# Patient Record
Sex: FEMALE | Race: WHITE | NOT HISPANIC OR LATINO | Employment: UNEMPLOYED | URBAN - METROPOLITAN AREA
[De-identification: names, ages, dates, MRNs, and addresses within clinical notes are randomized per-mention and may not be internally consistent; named-entity substitution may affect disease eponyms.]

---

## 2017-04-15 ENCOUNTER — HOSPITAL ENCOUNTER (OUTPATIENT)
Facility: MEDICAL CENTER | Age: 32
End: 2017-04-15
Attending: PHYSICIAN ASSISTANT
Payer: COMMERCIAL

## 2017-04-15 ENCOUNTER — OFFICE VISIT (OUTPATIENT)
Dept: URGENT CARE | Facility: PHYSICIAN GROUP | Age: 32
End: 2017-04-15
Payer: COMMERCIAL

## 2017-04-15 VITALS
OXYGEN SATURATION: 99 % | BODY MASS INDEX: 23.32 KG/M2 | HEART RATE: 106 BPM | DIASTOLIC BLOOD PRESSURE: 66 MMHG | HEIGHT: 65 IN | RESPIRATION RATE: 14 BRPM | SYSTOLIC BLOOD PRESSURE: 100 MMHG | TEMPERATURE: 101.3 F | WEIGHT: 140 LBS

## 2017-04-15 DIAGNOSIS — N10 PYELONEPHRITIS, ACUTE: ICD-10-CM

## 2017-04-15 DIAGNOSIS — N30.01 ACUTE CYSTITIS WITH HEMATURIA: ICD-10-CM

## 2017-04-15 DIAGNOSIS — J06.9 VIRAL URI: ICD-10-CM

## 2017-04-15 LAB
APPEARANCE UR: NORMAL
BILIRUB UR STRIP-MCNC: NORMAL MG/DL
COLOR UR AUTO: NORMAL
GLUCOSE UR STRIP.AUTO-MCNC: NEGATIVE MG/DL
INT CON NEG: NEGATIVE
INT CON NEG: NEGATIVE
INT CON POS: POSITIVE
INT CON POS: POSITIVE
KETONES UR STRIP.AUTO-MCNC: NEGATIVE MG/DL
LEUKOCYTE ESTERASE UR QL STRIP.AUTO: NORMAL
NITRITE UR QL STRIP.AUTO: POSITIVE
PH UR STRIP.AUTO: 6.5 [PH] (ref 5–8)
POC URINE PREGNANCY TEST: NEGATIVE
PROT UR QL STRIP: 30 MG/DL
RBC UR QL AUTO: NORMAL
S PYO AG THROAT QL: NEGATIVE
SP GR UR STRIP.AUTO: 1.01
UROBILINOGEN UR STRIP-MCNC: 8 MG/DL

## 2017-04-15 PROCEDURE — 99000 SPECIMEN HANDLING OFFICE-LAB: CPT | Performed by: PHYSICIAN ASSISTANT

## 2017-04-15 PROCEDURE — 99204 OFFICE O/P NEW MOD 45 MIN: CPT | Performed by: PHYSICIAN ASSISTANT

## 2017-04-15 PROCEDURE — 87880 STREP A ASSAY W/OPTIC: CPT | Performed by: PHYSICIAN ASSISTANT

## 2017-04-15 PROCEDURE — 81025 URINE PREGNANCY TEST: CPT | Performed by: PHYSICIAN ASSISTANT

## 2017-04-15 PROCEDURE — 87086 URINE CULTURE/COLONY COUNT: CPT

## 2017-04-15 PROCEDURE — 81002 URINALYSIS NONAUTO W/O SCOPE: CPT | Performed by: PHYSICIAN ASSISTANT

## 2017-04-15 PROCEDURE — 87077 CULTURE AEROBIC IDENTIFY: CPT

## 2017-04-15 PROCEDURE — 87186 SC STD MICRODIL/AGAR DIL: CPT

## 2017-04-15 RX ORDER — CEFTRIAXONE 1 G/1
1 INJECTION, POWDER, FOR SOLUTION INTRAMUSCULAR; INTRAVENOUS ONCE
Status: COMPLETED | OUTPATIENT
Start: 2017-04-15 | End: 2017-04-15

## 2017-04-15 RX ORDER — CIPROFLOXACIN 500 MG/1
500 TABLET, FILM COATED ORAL 2 TIMES DAILY
Qty: 20 TAB | Refills: 0 | Status: SHIPPED | OUTPATIENT
Start: 2017-04-15 | End: 2022-10-11

## 2017-04-15 RX ADMIN — CEFTRIAXONE 1 G: 1 INJECTION, POWDER, FOR SOLUTION INTRAMUSCULAR; INTRAVENOUS at 16:59

## 2017-04-15 NOTE — MR AVS SNAPSHOT
"        Lissette Lee   4/15/2017 4:15 PM   Office Visit   MRN: [id number]    Department:  [facility]   Dept Phone:  [phone]    Description:  Female : 1985   Provider:  Katya Perez PA-C           Reason for Visit     Urinary Frequency painful urination, Low back pain X 3 weeks     Pharyngitis ear fullness, nasal congestion, sinus pain and pressure, headache X 3 days       Allergies as of 4/15/2017     Allergen Noted Reactions    No Known Drug Allergy 10/14/2009         You were diagnosed with     Fever, unspecified fever cause   [8169281]       Acute cystitis with hematuria   [[id number]]       Viral URI   [[id number]]         Vital Signs     Blood Pressure Pulse Temperature Respirations Height Weight    100/66 mmHg 106 38.5 °C (101.3 °F) 14 1.651 m (5' 5\") 63.504 kg (140 lb)    Body Mass Index Oxygen Saturation                23.30 kg/m2 99%          Basic Information     Date Of Birth Sex Race Ethnicity Preferred Language    1985 Female White Non- English      Problem List              ICD-10-CM Priority Class Noted - Resolved    Supervision of normal pregnancy Z34.90   2011 - Present    Fetal pyelectasis- f/u with PANN O35.8XX0   2011 - Present    GBS (group B Streptococcus carrier), +RV culture, currently pregnant O99.820   2011 - Present      Health Maintenance        Date Due Completion Dates    PAP SMEAR 3/27/2006 ---    IMM DTaP/Tdap/Td Vaccine (2 - Td) 2021            Results     POCT Urinalysis      Component Value Standard Range & Units    POC Color Digna Negative    POC Appearance Cloudy Negative    POC Leukocyte Esterase Moderate Negative    POC Nitrites Positive Negative    POC Urobiligen 8 Negative (0.2) mg/dL    POC Protein 30 Negative mg/dL    POC Urine PH 6.5 5.0 - 8.0    POC Blood Moderate Negative    POC Specific Gravity 1.010 <1.005 - >1.030    POC Ketones Negative Negative mg/dL    POC Biliruben Small Negative mg/dL    POC Glucose " Negative Negative mg/dL                POCT Rapid Strep A      Component    Rapid Strep Screen    Negative    Internal Control Positive    Positive    Internal Control Negative    Negative                POCT Pregnancy      Component Value Standard Range & Units    POC Urine Pregnancy Test Negative Negative    Internal Control Positive Positive     Internal Control Negative Negative                         Current Immunizations     Influenza TIV (IM) 2/24/2011 10:49 AM    Tdap Vaccine 2/24/2011 10:50 AM      Below and/or attached are the medications your provider expects you to take. Review all of your home medications and newly ordered medications with your provider and/or pharmacist. Follow medication instructions as directed by your provider and/or pharmacist. Please keep your medication list with you and share with your provider. Update the information when medications are discontinued, doses are changed, or new medications (including over-the-counter products) are added; and carry medication information at all times in the event of emergency situations     Allergies:  NO KNOWN DRUG ALLERGY - (reactions not documented)               Medications  Valid as of: April 15, 2017 -  5:05 PM    Generic Name Brand Name Tablet Size Instructions for use    .                 Medicines prescribed today were sent to:     [facility][id number] - [place], [place] - [address] [facility]    [address] [place] [address]    Phone: [phone] Fax: [phone]    Open 24 Hours?: No      Medication refill instructions:       If your prescription bottle indicates you have medication refills left, it is not necessary to call your provider’s office. Please contact your pharmacy and they will refill your medication.    If your prescription bottle indicates you do not have any refills left, you may request refills at any time through one of the following ways: The online [employer] system (except Urgent Care), by calling your provider’s office, or by  asking your pharmacy to contact your provider’s office with a refill request. Medication refills are processed only during regular business hours and may not be available until the next business day. Your provider may request additional information or to have a follow-up visit with you prior to refilling your medication.   *Please Note: Medication refills are assigned a new Rx number when refilled electronically. Your pharmacy may indicate that no refills were authorized even though a new prescription for the same medication is available at the pharmacy. Please request the medicine by name with the pharmacy before contacting your provider for a refill.        Your To Do List     Future Labs/Procedures Complete By Expires    URINE CULTURE(NEW)  As directed 4/15/2018      Other Notes About Your Plan     IOL scheduled for 3/2 @ 10 am PG gel and 3/3 @ 10 am for AROM/Pit no instructions given.  Transfer of care from Dr. Valdes - all records scanned           H[employer] Access Code: [email or web address]  Expires: 5/15/2017  5:05 PM    Your email address is not on file at [facility].  Email Addresses are required for you to sign up for [employer], please contact [phone] to verify your personal information and to provide your email address prior to attempting to register for H[employer].    Lissette Lee  [address] [place] [address], [place] [address]    H[employer] Space  A secure, online tool to manage your health information     [facility]’s H[employer] Space® is a secure, online tool that connects you to your personalized health information from the privacy of your home -- day or night - making it very easy for you to manage your healthcare. Once the activation process is completed, you can even access your medical information using the H[employer] Space karthikeyan, which is available for free in the Apple Karthikeyan store or Google Play store.     To learn more about Hire Space, visit [email or web address]    There are two levels of access available (as shown below):   My Chart  Features  [facility]  Specialists [facility]  Urgent  Care Non-[facility] Primary [facility] Doctor   Email your healthcare team securely and privately 24/7 X X X    Manage appointments: schedule your next appointment; view details of past/upcoming appointments X      Request prescription refills. X      View recent personal medical records, including lab and immunizations X X X X   View health record, including health history, allergies, medications X X X X   Read reports about your outpatient visits, procedures, consult and ER notes X X X X   See your discharge summary, which is a recap of your hospital and/or ER visit that includes your diagnosis, lab results, and care plan X X  X     How to register for Vendor Registry:  Once your e-mail address has been verified, follow the following steps to sign up for Vendor Registry.     1. Go to  [email or web address]  2. Click on the Sign Up Now box, which takes you to the New Member Sign Up page. You will need to provide the following information:  a. Enter your Vendor Registry Access Code exactly as it appears at the top of this page. (You will not need to use this code after you’ve completed the sign-up process. If you do not sign up before the expiration date, you must request a new code.)   b. Enter your date of birth.   c. Enter your home email address.   d. Click Submit, and follow the next screen’s instructions.  3. Create a Vendor Registry ID. This will be your Vendor Registry login ID and cannot be changed, so think of one that is secure and easy to remember.  4. Create a Vendor Registry password. You can change your password at any time.  5. Enter your Password Reset Question and Answer. This can be used at a later time if you forget your password.   6. Enter your e-mail address. This allows you to receive e-mail notifications when new information is available in Vendor Registry.  7. Click Sign Up. You can now view your health information.    For assistance activating your Vendor Registry account, call [phone]

## 2017-04-15 NOTE — PROGRESS NOTES
Chief Complaint   Patient presents with   • Urinary Frequency     painful urination, Low back pain X 3 weeks    • Pharyngitis     ear fullness, nasal congestion, sinus pain and pressure, headache X 3 days        HISTORY OF PRESENT ILLNESS: Patient is a 32 y.o. female who presents today for    1. 3 weeks of UTI symptoms, seemingly worsening in last 3-4 days.   Patient states her symptoms overall have included painful and frequent urination however they have waxed and waned rather than progressively worsened throughout the course of illness.  She started getting right flank pain in the last few days and has felt feverish.   No nausea or vomiting.  No attempted interventions.   Denies vaginal discharge or concern for STD.  LMP last month.     2. In addition patient has also had nasal congestion, sore throat and headache in last 3 days.  She felt that the fevers were explained by this.  Not worsening but not getting better.  Not really coughing.   No attempted interventions.     Patient Active Problem List    Diagnosis Date Noted   • GBS (group B Streptococcus carrier), +RV culture, currently pregnant 01/31/2011   • Supervision of normal pregnancy 01/12/2011   • Fetal pyelectasis- f/u with PANN 01/12/2011       Allergies:No known drug allergy    No current [facility]-ordered outpatient prescriptions on file.     No current [facility]-ordered facility-administered medications on file.       Past Medical History   Diagnosis Date   • ASTHMA        Social History   Substance Use Topics   • Smoking status: Never Smoker    • Smokeless tobacco: None   • Alcohol Use: No       Family Status   Relation Status Death Age   • Mother Alive    • Father Alive    • Maternal Grandmother Alive    • Maternal Grandfather Alive    • Paternal Grandmother Alive    • Paternal Grandfather Alive      Family History   Problem Relation Age of Onset   • Cancer Maternal Grandmother      breast cancer        ROS:  Review of Systems   Constitutional: SEE  "HPI  HENT: SEE HPI  Eyes: Negative for blurred vision.   Respiratory: Negative for cough, sputum production, shortness of breath and wheezing.    Cardiovascular: Negative for chest pain, palpitations, orthopnea and leg swelling.   Gastrointestinal: SEE HPI  Genitourinary: SEE HPI  All other systems reviewed and are negative.       Exam:  Blood pressure 100/66, pulse 106, temperature 38.5 °C (101.3 °F), resp. rate 14, height 1.651 m (5' 5\"), weight 63.504 kg (140 lb), SpO2 99 %, unknown if currently breastfeeding.  General:  Well nourished, well developed female in NAD   Eyes: PERRLA, EOM within normal limits, no conjunctival injection, no scleral icterus, visual fields and acuity grossly intact.  Ears: Normal shape and symmetry, no tenderness, no discharge. External canals are without any significant edema or erythema. Tympanic membranes are without any inflammation, no effusion. Gross auditory acuity is intact  Nose: Symmetrical, sinuses without tenderness, clear rhinorrhea.   Mouth: reasonable hygiene, moderate diffuse erythema exudates or tonsillar enlargement.  Neck: no masses, range of motion within normal limits, no tracheal deviation. No lymphadenopathy  Pulmonary: Normal respiratory effort, no wheezes, crackles, or rhonchi.  Cardiovascular: regular rate and rhythm without murmurs, rubs, or gallops.  Abdomen: Soft, moderate suprapubic TTP, nondistended. Normal bowel sounds. No hepatosplenomegaly or masses, or hernias. No rebound or guarding.  + right CVA tenderness.   Skin: No visible rashes or lesion. Warm, pink, dry.   Extremities: no clubbing, cyanosis, or edema.  Neuro: A&O x 3. Speech normal/clear.  Normal gait.       Component Results      Component Value Ref Range & Units Status     POC Color Digna Negative Final     POC Appearance Cloudy Negative Final     POC Leukocyte Esterase Moderate Negative Final     POC Nitrites Positive Negative Final     POC Urobiligen 8 Negative (0.2) mg/dL Final     POC " Protein 30 Negative mg/dL Final     POC Urine PH 6.5 5.0 - 8.0 Final     POC Blood Moderate Negative Final     POC Specific Gravity 1.010 <1.005 - >1.030 Final     POC Ketones Negative Negative mg/dL Final     POC Biliruben Small Negative mg/dL Final     POC Glucose Negative Negative mg/dL Final       Assessment/Plan:  1. Pyelonephritis, acute  cefTRIAXone (ROCEPHIN) injection 1 g    POCT Rapid Strep A    ciprofloxacin (CIPRO) 500 MG Tab   2. Acute cystitis with hematuria  cefTRIAXone (ROCEPHIN) injection 1 g    POCT Urinalysis    POCT Pregnancy    URINE CULTURE(NEW)    ciprofloxacin (CIPRO) 500 MG Tab   3. Viral URI         -fevers in setting of right CVA tenderness and 3 weeks of UTI symptoms will cover presumptively for acute pyelonephritis.   -1 gram Rocephin given in clinic and patient monitored for 15 mins.   -Cipro to follow.   -culture sent  -strep was ran, URI with benign viral appearing process.  Supportive care discussed  -void before/after intercourse.  Recommend abstain until treatment complete/symptoms resolved.   -signs and symptoms of worsening  infection discussed and to seek prompt medical care should they arise, recommend ER at that point.       Supportive care, differential diagnoses, and indications for immediate follow-up discussed with patient.   Pathogenesis of diagnosis discussed including typical length and natural progression.   Instructed to return to clinic or nearest emergency department for any change in condition, further concerns, or worsening of symptoms.  Patient states understanding of the plan of care and discharge instructions.      Katya Perez PA-C

## 2017-04-16 DIAGNOSIS — N30.01 ACUTE CYSTITIS WITH HEMATURIA: ICD-10-CM

## 2017-04-18 LAB
BACTERIA UR CULT: ABNORMAL
SIGNIFICANT IND 70042: ABNORMAL
SOURCE SOURCE: ABNORMAL

## 2021-02-09 ENCOUNTER — HOSPITAL ENCOUNTER (EMERGENCY)
Facility: MEDICAL CENTER | Age: 36
End: 2021-02-09
Payer: COMMERCIAL

## 2021-02-09 VITALS
RESPIRATION RATE: 18 BRPM | DIASTOLIC BLOOD PRESSURE: 157 MMHG | SYSTOLIC BLOOD PRESSURE: 227 MMHG | HEART RATE: 106 BPM | HEIGHT: 65 IN | BODY MASS INDEX: 23.3 KG/M2 | TEMPERATURE: 97.5 F | OXYGEN SATURATION: 100 %

## 2021-02-09 PROCEDURE — 302449 STATCHG TRIAGE ONLY (STATISTIC)

## 2021-02-09 NOTE — ED NOTES
Called pt to be triaged with no response. Pt not in the lobby, restroom, or outside. Pt will be dismissed.

## 2021-03-19 ENCOUNTER — HOSPITAL ENCOUNTER (EMERGENCY)
Facility: MEDICAL CENTER | Age: 36
End: 2021-03-19
Attending: EMERGENCY MEDICINE

## 2021-03-19 VITALS
SYSTOLIC BLOOD PRESSURE: 181 MMHG | HEART RATE: 76 BPM | DIASTOLIC BLOOD PRESSURE: 123 MMHG | WEIGHT: 130 LBS | HEIGHT: 67 IN | BODY MASS INDEX: 20.4 KG/M2 | RESPIRATION RATE: 25 BRPM | OXYGEN SATURATION: 100 % | TEMPERATURE: 98 F

## 2021-03-19 DIAGNOSIS — R41.82 ALTERED MENTAL STATUS, UNSPECIFIED ALTERED MENTAL STATUS TYPE: ICD-10-CM

## 2021-03-19 DIAGNOSIS — T50.901A ACCIDENTAL DRUG OVERDOSE, INITIAL ENCOUNTER: ICD-10-CM

## 2021-03-19 LAB
ANION GAP SERPL CALC-SCNC: 11 MMOL/L (ref 7–16)
BASOPHILS # BLD AUTO: 5.2 % (ref 0–1.8)
BUN SERPL-MCNC: 20 MG/DL (ref 8–22)
CALCIUM SERPL-MCNC: 9.1 MG/DL (ref 8.5–10.5)
CHLORIDE SERPL-SCNC: 102 MMOL/L (ref 96–112)
CO2 SERPL-SCNC: 23 MMOL/L (ref 20–33)
CREAT SERPL-MCNC: 1.38 MG/DL (ref 0.5–1.4)
EOSINOPHIL NFR BLD: 7 % (ref 0–6.9)
ERYTHROCYTE [DISTWIDTH] IN BLOOD BY AUTOMATED COUNT: 42.9 FL (ref 35.9–50)
GLUCOSE SERPL-MCNC: 208 MG/DL (ref 65–99)
HCT VFR BLD AUTO: 42.7 % (ref 37–47)
HGB BLD-MCNC: 13.4 G/DL (ref 12–16)
LG PLATELETS BLD QL SMEAR: NORMAL
LYMPHOCYTES NFR BLD: 41.7 % (ref 22–41)
MANUAL DIFF BLD: NORMAL
MCH RBC QN AUTO: 26 PG (ref 27–33)
MCHC RBC AUTO-ENTMCNC: 31.4 G/DL (ref 33.6–35)
MCV RBC AUTO: 82.9 FL (ref 81.4–97.8)
MONOCYTES NFR BLD AUTO: 4.4 % (ref 0–13.4)
MORPHOLOGY BLD-IMP: NORMAL
NEUTROPHILS NFR BLD: 41.7 % (ref 44–72)
NRBC # BLD AUTO: 0 K/UL
NRBC BLD-RTO: 0 /100 WBC
PLATELET # BLD AUTO: 275 K/UL (ref 164–446)
PLATELET BLD QL SMEAR: NORMAL
PMV BLD AUTO: 11.6 FL (ref 9–12.9)
POTASSIUM SERPL-SCNC: 3.9 MMOL/L (ref 3.6–5.5)
RBC # BLD AUTO: 5.15 M/UL (ref 4.2–5.4)
RBC BLD AUTO: PRESENT
SODIUM SERPL-SCNC: 136 MMOL/L (ref 135–145)
WBC # BLD AUTO: 12.1 K/UL (ref 4.8–10.8)

## 2021-03-19 PROCEDURE — 80048 BASIC METABOLIC PNL TOTAL CA: CPT

## 2021-03-19 PROCEDURE — 85007 BL SMEAR W/DIFF WBC COUNT: CPT

## 2021-03-19 PROCEDURE — 99284 EMERGENCY DEPT VISIT MOD MDM: CPT

## 2021-03-19 PROCEDURE — 85027 COMPLETE CBC AUTOMATED: CPT

## 2021-03-19 PROCEDURE — 700111 HCHG RX REV CODE 636 W/ 250 OVERRIDE (IP): Performed by: EMERGENCY MEDICINE

## 2021-03-19 PROCEDURE — 96374 THER/PROPH/DIAG INJ IV PUSH: CPT

## 2021-03-19 RX ORDER — NALOXONE HYDROCHLORIDE 4 MG/.1ML
1 SPRAY NASAL PRN
Qty: 1 EACH | Refills: 0 | Status: SHIPPED | OUTPATIENT
Start: 2021-03-19 | End: 2022-10-11

## 2021-03-19 RX ORDER — NALOXONE HYDROCHLORIDE 0.4 MG/ML
1 INJECTION, SOLUTION INTRAMUSCULAR; INTRAVENOUS; SUBCUTANEOUS ONCE
Status: COMPLETED | OUTPATIENT
Start: 2021-03-19 | End: 2021-03-19

## 2021-03-19 RX ADMIN — NALOXONE HYDROCHLORIDE 1 MG: 0.4 INJECTION, SOLUTION INTRAMUSCULAR; INTRAVENOUS; SUBCUTANEOUS at 06:35

## 2021-03-19 NOTE — ED PROVIDER NOTES
ED Provider Note    ER PROVIDER NOTE      CHIEF COMPLAINT  Chief Complaint   Patient presents with   • ALOC       HPI  Xena Abarca-Marvin is a 89 y.o. adult who presents to the emergency department unresponsive.  Patient was dropped off in the front by an unknown party, no other history is obtainable.  Patient was placed on a gurney and brought immediately back, she is unable to provide any history as she is unresponsive.  There is no trauma noted or seizure activity.    No other history is obtainable due to patient's clinical status    REVIEW OF SYSTEMS  Pertinent positives include unresponsive. Pertinent negatives include no seizure activity. See HPI for details. All other systems reviewed and are negative.    PAST MEDICAL HISTORY   Unknown    SURGICAL HISTORY  patient denies any surgical history    FAMILY HISTORY  No family history on file.    SOCIAL HISTORY  Social History     Socioeconomic History   • Marital status:      Spouse name: Not on file   • Number of children: Not on file   • Years of education: Not on file   • Highest education level: Not on file   Occupational History   • Not on file   Tobacco Use   • Smoking status: Not on file   Substance and Sexual Activity   • Alcohol use: Not on file   • Drug use: Not on file   • Sexual activity: Not on file   Other Topics Concern   • Not on file   Social History Narrative   • Not on file     Social Determinants of Health     Financial Resource Strain:    • Difficulty of Paying Living Expenses:    Food Insecurity:    • Worried About Running Out of Food in the Last Year:    • Ran Out of Food in the Last Year:    Transportation Needs:    • Lack of Transportation (Medical):    • Lack of Transportation (Non-Medical):    Physical Activity:    • Days of Exercise per Week:    • Minutes of Exercise per Session:    Stress:    • Feeling of Stress :    Social Connections:    • Frequency of Communication with Friends and Family:    • Frequency of Social Gatherings  "with Friends and Family:    • Attends Zoroastrianism Services:    • Active Member of Clubs or Organizations:    • Attends Club or Organization Meetings:    • Marital Status:    Intimate Partner Violence:    • Fear of Current or Ex-Partner:    • Emotionally Abused:    • Physically Abused:    • Sexually Abused:       Social History     Substance and Sexual Activity   Drug Use Not on file       CURRENT MEDICATIONS  Home Medications    **Home medications have not yet been reviewed for this encounter**         ALLERGIES  No Known Allergies    PHYSICAL EXAM  VITAL SIGNS: BP (!) 180/124   Pulse 87   Temp 36.7 °C (98 °F) (Oral)   Resp 12   Ht 1.702 m (5' 7\")   Wt 59 kg (130 lb)   LMP 02/26/2021 (Approximate)   SpO2 100%   BMI 20.36 kg/m²   Pulse ox interpretation: I interpret this pulse ox as normal.    Constitutional: Unresponsive, patient is breathing but does not respond to any stimuli  HENT: No signs of trauma, Bilateral external ears normal, Nose normal.   Eyes: Pupils are pinpoint, Conjunctiva normal, Non-icteric.   Neck: Normal range of motion,  Supple, No stridor.   Cardiovascular: Regular rate and rhythm, no murmurs.   Thorax & Lungs: Hypopneic, equal breath sounds bilaterally  Abdomen: Soft, No tenderness, No masses, No pulsatile masses. No peritoneal signs.  Skin: Warm, Dry, No erythema, No rash.   Back: No bony tenderness, No CVA tenderness.   Extremities: Intact distal pulses, No edema, No tenderness, No cyanosis,   Musculoskeletal: Good range of motion in all major joints. No tenderness to palpation or major deformities noted.   Neurologic: Patient is fully unresponsive, to any stimuli, GCS 3  Psychiatric: Unresponsive    DIAGNOSTIC STUDIES / PROCEDURES      LABS  Labs Reviewed   CBC WITH DIFFERENTIAL - Abnormal; Notable for the following components:       Result Value    WBC 12.1 (*)     MCH 26.0 (*)     MCHC 31.4 (*)     Neutrophils-Polys 41.70 (*)     Lymphocytes 41.70 (*)     Eosinophils 7.00 (*)     " Basophils 5.20 (*)     All other components within normal limits    Narrative:     Release to patient->Immediate   BASIC METABOLIC PANEL - Abnormal; Notable for the following components:    Glucose 208 (*)     All other components within normal limits    Narrative:     Release to patient->Immediate   ESTIMATED GFR - Abnormal; Notable for the following components:    GFR If  52 (*)     GFR If Non  43 (*)     All other components within normal limits    Narrative:     Release to patient->Immediate   DIFFERENTIAL MANUAL    Narrative:     Release to patient->Immediate   PERIPHERAL SMEAR REVIEW    Narrative:     Release to patient->Immediate   PLATELET ESTIMATE    Narrative:     Release to patient->Immediate   MORPHOLOGY    Narrative:     Release to patient->Immediate       All labs reviewed by me.    RADIOLOGY  No orders to display     The radiologist's interpretation of all radiological studies have been reviewed and images independently viewed by me.    COURSE & MEDICAL DECISION MAKING  Nursing notes, VS, PMSFHx reviewed in chart.    6:35 AM patient was brought immediately back to room, fully unresponsive, patient was quickly connected to monitors, oxygen, peripheral IV established, 1 mg of Narcan delivered    6:37  Patient became fully awake, she is crying, confused but fully awake and interactive    6:44 AM  Patient is more calm, still awake and anxious, she does not remember taking any medications last night or where she was last night and is wondering who dropped her off.    Patient's boyfriend is now present, providing more history patient partook in vodka, OxyContin Xanax and methamphetamines    7:01 AM  Patient is reevaluated, she is still awake and alert    9:20 AM    Patient is reevaluated again, after prolonged observation, she is still awake, alert.  I offered to dispense Narcan for her to go home with which she declined      Th[facility] was here with a confirmed overdose of  T40.2 - Other opioids; X[place] Thirty-Two has no other known overdoses.        Decision Making:  This is a 35-year-old female presenting after overdose.  This appears to be polypharmacy with opiates, methamphetamine alcohol and potentially benzodiazepines as well.  Patient's was given Narcan with full arousal, she has been monitored here in the emergency department with no depression of her mental status to suggest continued significant toxic effect of her drug use.  Her electrolytes are unremarkable other than a slightly elevated glucose which would be consistent with her acute stress reaction.  No evidence of other metabolic disturbance, hypoglycemia hyponatremia or other to explain her altered mental status.  Patient was counseled on drug cessation, she will be provided with a Narcan prescription and follow-up resources     The patient will return for new or worsening symptoms and is stable at the time of discharge.    The patient is referred to a primary physician for blood pressure management, diabetic screening, and for all other preventative health concerns.        DISPOSITION:  Patient will be discharged home in stable condition.    FOLLOW UP:  [address] [address] [phone]    As needed      OUTPATIENT MEDICATIONS:  New Prescriptions    NALOXONE (NARCAN) 4 MG/0.1ML LIQUID    Administer 4 mg into affected nostril(S) as needed (overdose).         FINAL IMPRESSION  1. Accidental drug overdose, initial encounter    2. Altered mental status, unspecified altered mental status type      The total critical care time spent on this patient was 40 minutes, resuscitating patient, speaking with admitting physician, and interpreting test results. This 40 minutes is exclusive of separately billable procedures.       The note accurately reflects work and decisions made by me.  Fam Puga M.D.  3/19/2021  9:22 AM

## 2021-03-19 NOTE — ED NOTES
"Pt dropped off outside ER lobby by a man stating to ER tech pt \"wasn't breathing, please help\". Pulse present, shallow respirations. Pt wheeled to red 10   "

## 2021-03-19 NOTE — ED NOTES
Per pharmacy unable to provide intranasal narcan at this time. Pt states able to fill prescription. Pt alert and oriented. Ambulates with steady gait from ED

## 2021-03-19 NOTE — ED NOTES
"Per pt's boyfriend pt took \"Oxy 30mg, 1/4 Xanax, Meth and unknown amount of vodka\", pt denies SI  "

## 2021-03-19 NOTE — ED TRIAGE NOTES
"[place] Thirty-Two  89 y.o. adult  Chief Complaint   Patient presents with   • ALOC     Pt found unresponsive with pt's friend in front of ED lobby. Narcan immediately given. GCS 14, awake, crying.    BP (!) 200/127   Pulse 124   Resp 25   Ht 1.702 m (5' 7\")   Wt 59 kg (130 lb)   SpO2 100%     "

## 2021-03-19 NOTE — ED NOTES
Rounded on pt, pt is alert and orientated, speaking in full sentences, responds to commands and answers appropriately.  Resp are even and unlabored.  No behavioral indicators of pain. Family at bedside

## 2021-03-19 NOTE — ED NOTES
Received report from VERENICE May. Pt tearful in bed with boyfriend at bedside. Side rails up, call light in reach. Oxygen applied at 4 L via n/c.

## 2022-07-27 ENCOUNTER — APPOINTMENT (OUTPATIENT)
Dept: RADIOLOGY | Facility: MEDICAL CENTER | Age: 37
End: 2022-07-27
Attending: EMERGENCY MEDICINE
Payer: MEDICAID

## 2022-07-27 ENCOUNTER — HOSPITAL ENCOUNTER (EMERGENCY)
Facility: MEDICAL CENTER | Age: 37
End: 2022-07-28
Attending: EMERGENCY MEDICINE
Payer: MEDICAID

## 2022-07-27 DIAGNOSIS — N83.8 OVARIAN MASS: ICD-10-CM

## 2022-07-27 LAB
ALBUMIN SERPL BCP-MCNC: 4.8 G/DL (ref 3.2–4.9)
ALBUMIN/GLOB SERPL: 1.5 G/DL
ALP SERPL-CCNC: 94 U/L (ref 30–99)
ALT SERPL-CCNC: 33 U/L (ref 2–50)
ANION GAP SERPL CALC-SCNC: 11 MMOL/L (ref 7–16)
AST SERPL-CCNC: 35 U/L (ref 12–45)
BASOPHILS # BLD AUTO: 0.7 % (ref 0–1.8)
BASOPHILS # BLD: 0.04 K/UL (ref 0–0.12)
BILIRUB SERPL-MCNC: 0.2 MG/DL (ref 0.1–1.5)
BUN SERPL-MCNC: 20 MG/DL (ref 8–22)
CALCIUM SERPL-MCNC: 9.5 MG/DL (ref 8.5–10.5)
CHLORIDE SERPL-SCNC: 102 MMOL/L (ref 96–112)
CO2 SERPL-SCNC: 25 MMOL/L (ref 20–33)
CREAT SERPL-MCNC: 1.15 MG/DL (ref 0.5–1.4)
EOSINOPHIL # BLD AUTO: 0.21 K/UL (ref 0–0.51)
EOSINOPHIL NFR BLD: 3.6 % (ref 0–6.9)
ERYTHROCYTE [DISTWIDTH] IN BLOOD BY AUTOMATED COUNT: 42.9 FL (ref 35.9–50)
GFR SERPLBLD CREATININE-BSD FMLA CKD-EPI: 63 ML/MIN/1.73 M 2
GLOBULIN SER CALC-MCNC: 3.3 G/DL (ref 1.9–3.5)
GLUCOSE SERPL-MCNC: 84 MG/DL (ref 65–99)
HCG SERPL QL: NEGATIVE
HCT VFR BLD AUTO: 34.7 % (ref 37–47)
HGB BLD-MCNC: 10.7 G/DL (ref 12–16)
IMM GRANULOCYTES # BLD AUTO: 0.02 K/UL (ref 0–0.11)
IMM GRANULOCYTES NFR BLD AUTO: 0.3 % (ref 0–0.9)
LIPASE SERPL-CCNC: 22 U/L (ref 11–82)
LYMPHOCYTES # BLD AUTO: 1.61 K/UL (ref 1–4.8)
LYMPHOCYTES NFR BLD: 27.2 % (ref 22–41)
MCH RBC QN AUTO: 22 PG (ref 27–33)
MCHC RBC AUTO-ENTMCNC: 30.8 G/DL (ref 33.6–35)
MCV RBC AUTO: 71.4 FL (ref 81.4–97.8)
MONOCYTES # BLD AUTO: 0.45 K/UL (ref 0–0.85)
MONOCYTES NFR BLD AUTO: 7.6 % (ref 0–13.4)
NEUTROPHILS # BLD AUTO: 3.58 K/UL (ref 2–7.15)
NEUTROPHILS NFR BLD: 60.6 % (ref 44–72)
NRBC # BLD AUTO: 0 K/UL
NRBC BLD-RTO: 0 /100 WBC
PLATELET # BLD AUTO: 251 K/UL (ref 164–446)
PMV BLD AUTO: 11.1 FL (ref 9–12.9)
POTASSIUM SERPL-SCNC: 4.2 MMOL/L (ref 3.6–5.5)
PROT SERPL-MCNC: 8.1 G/DL (ref 6–8.2)
RBC # BLD AUTO: 4.86 M/UL (ref 4.2–5.4)
SODIUM SERPL-SCNC: 138 MMOL/L (ref 135–145)
WBC # BLD AUTO: 5.9 K/UL (ref 4.8–10.8)

## 2022-07-27 PROCEDURE — 99284 EMERGENCY DEPT VISIT MOD MDM: CPT

## 2022-07-27 PROCEDURE — 80053 COMPREHEN METABOLIC PANEL: CPT

## 2022-07-27 PROCEDURE — 36415 COLL VENOUS BLD VENIPUNCTURE: CPT

## 2022-07-27 PROCEDURE — 85025 COMPLETE CBC W/AUTO DIFF WBC: CPT

## 2022-07-27 PROCEDURE — 83690 ASSAY OF LIPASE: CPT

## 2022-07-27 PROCEDURE — 76856 US EXAM PELVIC COMPLETE: CPT

## 2022-07-27 PROCEDURE — 74177 CT ABD & PELVIS W/CONTRAST: CPT

## 2022-07-27 PROCEDURE — 700117 HCHG RX CONTRAST REV CODE 255: Performed by: EMERGENCY MEDICINE

## 2022-07-27 PROCEDURE — 84703 CHORIONIC GONADOTROPIN ASSAY: CPT

## 2022-07-27 RX ADMIN — IOHEXOL 80 ML: 350 INJECTION, SOLUTION INTRAVENOUS at 21:25

## 2022-07-28 VITALS
TEMPERATURE: 97.3 F | HEIGHT: 65 IN | WEIGHT: 142.2 LBS | HEART RATE: 88 BPM | BODY MASS INDEX: 23.69 KG/M2 | RESPIRATION RATE: 17 BRPM | SYSTOLIC BLOOD PRESSURE: 185 MMHG | OXYGEN SATURATION: 99 % | DIASTOLIC BLOOD PRESSURE: 90 MMHG

## 2022-07-28 NOTE — ED NOTES
"Pt declining labs at this time. States \"I just want to figure out what is wrong with my abdomen and not worry about my blood pressure right now\". Pt back to lobby.   "

## 2022-07-28 NOTE — ED PROVIDER NOTES
"ED Provider Note    CHIEF COMPLAINT  Chief Complaint   Patient presents with   • Abdominal Pain     LLQ abd pain. Began in January. States \"feels something hard\". Seen at an ER in [place]. Told to follow up with a surgeon and have a biopsy. Denies having any imaging   • Hypertension     Pt BP on arrival 240s systolic. In triage bilateral /143 R, 214/146 L. Reports told had elevated BP at one time and prescribed meds but did not start taking them. Pt denies any BUENO, vision changes, CP or SOB.        HPI  Lissette Lee is a 37 y.o. female who presents to the emergency department with persistent mass and lower abdomen. She explained that she is noticed and asked for at least 6 to 7 months. [place]. She did go to local ER there and they stated that she might need a surgical biopsy but no other imaging or workup was completed at that time.    Due to continued ability to feel this mass as well as intermittent pain she decided to come the emergency room here that she is visiting her mother here in [place]. She notes that the pain waxes and wanes. No difficulty with urination or defecation. She has had occasional bleeding after intercourse but no pain with intercourse. Denies any STDs. No upper abdominal pain. She has had prior gynecological exams not of which have ever been abnormal in the past. No other history of STDs. Denies current pregnancy. Has had two negative home pregnancy tests.    REVIEW OF SYSTEMS  See HPI for further details. All other systems are negative.     PAST MEDICAL HISTORY   has a past medical history of ASTHMA.    SOCIAL HISTORY  Social History     Tobacco Use   • Smoking status: Never Smoker   • Smokeless tobacco: Never Used   Substance and Sexual Activity   • Alcohol use: Yes     Comment: occ   • Drug use: No   • Sexual activity: Yes     Partners: Male     Birth control/protection: Pill       SURGICAL HISTORY  patient denies any " "surgical history    CURRENT MEDICATIONS  Home Medications     Reviewed by Jovita Reed R.N. (Registered Nurse) on 07/27/22 at 1843  Med List Status: Partial   Medication Last Dose Status   ciprofloxacin (CIPRO) 500 MG Tab  Active   Naloxone (NARCAN) 4 MG/0.1ML Liquid  Active                ALLERGIES  Allergies   Allergen Reactions   • No Known Drug Allergy        PHYSICAL EXAM  VITAL SIGNS: BP (!) 185/90   Pulse 88   Temp 36.3 °C (97.3 °F)   Resp 17   Ht 1.651 m (5' 5\")   Wt 64.5 kg (142 lb 3.2 oz)   LMP 07/26/2022   SpO2 99%   BMI 23.66 kg/m²  @JACKIE[[id number]::@   Pulse ox interpretation: I interpret this pulse ox as normal.  Constitutional: Alert in no apparent distress.  HENT: No signs of trauma, Bilateral external ears normal, Nose normal.   Eyes: Pupils are equal and reactive  Neck: Normal range of motion, No tenderness, Supple  Cardiovascular: Regular rate and rhythm, no murmurs.   Thorax & Lungs: Normal breath sounds, No respiratory distress, No wheezing, No chest tenderness.   Abdomen: Bowel sounds normal, large palpable mass to the lower abdomen.  Skin: Warm, Dry, No erythema, No rash.   Extremities: Intact distal pulses  Musculoskeletal: Good range of motion in all major joints. No tenderness to palpation or major deformities noted.   Neurologic: Alert , Normal motor function, Normal sensory function, No focal deficits noted.   Psychiatric: Affect normal, Judgment normal, Mood normal.       DIAGNOSTIC STUDIES / PROCEDURES      LABS  Results for orders placed or performed during the hospital encounter of 07/27/22   CBC WITH DIFFERENTIAL   Result Value Ref Range    WBC 5.9 4.8 - 10.8 K/uL    RBC 4.86 4.20 - 5.40 M/uL    Hemoglobin 10.7 (L) 12.0 - 16.0 g/dL    Hematocrit 34.7 (L) 37.0 - 47.0 %    MCV 71.4 (L) 81.4 - 97.8 fL    MCH 22.0 (L) 27.0 - 33.0 pg    MCHC 30.8 (L) 33.6 - 35.0 g/dL    RDW 42.9 35.9 - 50.0 fL    Platelet Count 251 164 - 446 K/uL    MPV 11.1 9.0 - 12.9 fL    Neutrophils-Polys 60.60 " 44.00 - 72.00 %    Lymphocytes 27.20 22.00 - 41.00 %    Monocytes 7.60 0.00 - 13.40 %    Eosinophils 3.60 0.00 - 6.90 %    Basophils 0.70 0.00 - 1.80 %    Immature Granulocytes 0.30 0.00 - 0.90 %    Nucleated RBC 0.00 /100 WBC    Neutrophils (Absolute) 3.58 2.00 - 7.15 K/uL    Lymphs (Absolute) 1.61 1.00 - 4.80 K/uL    Monos (Absolute) 0.45 0.00 - 0.85 K/uL    Eos (Absolute) 0.21 0.00 - 0.51 K/uL    Baso (Absolute) 0.04 0.00 - 0.12 K/uL    Immature Granulocytes (abs) 0.02 0.00 - 0.11 K/uL    NRBC (Absolute) 0.00 K/uL   COMP METABOLIC PANEL   Result Value Ref Range    Sodium 138 135 - 145 mmol/L    Potassium 4.2 3.6 - 5.5 mmol/L    Chloride 102 96 - 112 mmol/L    Co2 25 20 - 33 mmol/L    Anion Gap 11.0 7.0 - 16.0    Glucose 84 65 - 99 mg/dL    Bun 20 8 - 22 mg/dL    Creatinine 1.15 0.50 - 1.40 mg/dL    Calcium 9.5 8.5 - 10.5 mg/dL    AST(SGOT) 35 12 - 45 U/L    ALT(SGPT) 33 2 - 50 U/L    Alkaline Phosphatase 94 30 - 99 U/L    Total Bilirubin 0.2 0.1 - 1.5 mg/dL    Albumin 4.8 3.2 - 4.9 g/dL    Total Protein 8.1 6.0 - 8.2 g/dL    Globulin 3.3 1.9 - 3.5 g/dL    A-G Ratio 1.5 g/dL   LIPASE   Result Value Ref Range    Lipase 22 11 - 82 U/L   HCG QUAL SERUM   Result Value Ref Range    Beta-Hcg Qualitative Serum Negative Negative   ESTIMATED GFR   Result Value Ref Range    GFR (CKD-EPI) 63 >60 mL/min/1.73 m 2     ]  RADIOLOGY  US-PELVIC TRANSABDOMINAL ONLY   Final Result         1.  The ovaries are difficult to definitively identified, however there appears to be marked enlargement of the bilateral ovaries with large echogenic structures in these presumed bilateral ovaries. Appearance suggests possible large hemorrhagic cysts or    endometriomas, gynecologic referral for further evaluation and management. MRI with contrast may offer additional diagnostic benefit.   2.  Thickened endometrial stripe, may represent proliferative changes given patient age, consider endometrial pathology with additional workup as clinically  appropriate.      CT-ABDOMEN-PELVIS WITH   Final Result         1.  Bilateral large adnexal cystic masses. Could represent large ovarian cysts, others cystic mass is not excluded. Recommend further evaluation with pelvic sonogram for further characterization.              COURSE & MEDICAL DECISION MAKING  Pertinent Labs & Imaging studies reviewed. (See chart for details)  37-year-old female presented to the emergency department with lower abdominal mass. History as above. Initial CT imaging showing likely bilateral ovarian pathology. Ultrasound completed as recommended by radiologist. They continues to show likely cystic mass possibly representing a hemorrhagic cyst or other mass not definitively be excluded. At this point the patient will be referred to local OB/GYN for further outpatient workup including MRI as recommended by radiology. Given the chronicity of this the patient appears relatively stable to believe that the patient can be continue with outpatient workup. She is however understanding of return precautions if needed.       The patient will return for worsening symptoms and is stable at the time of discharge. The patient verbalizes understanding and will comply.    FINAL IMPRESSION  1. Ovarian mass            Electronically signed by: Gallito Guthrie M.D., 7/27/2022 8:35 PM

## 2022-07-28 NOTE — ED TRIAGE NOTES
"Lissette Lee  37 y.o. female  Chief Complaint   Patient presents with   • Abdominal Pain     LLQ abd pain. Began in January. States \"feels something hard\". Seen at an ER in [place]. Told to follow up with a surgeon and have a biopsy. Denies having any imaging   • Hypertension     Pt BP on arrival 240s systolic. In triage bilateral /143 R, 214/146 L. Reports told had elevated BP at one time and prescribed meds but did not start taking them. Pt denies any BUENO, vision changes, CP or SOB.      Pt ambulatory to triage for above. Pt speaking in full sentences, no acute distress.     Triage process explained to patient, returned to lobby. Pt encouraged to notify staff of any change in condition.   "

## 2022-07-29 ENCOUNTER — TELEPHONE (OUTPATIENT)
Dept: OBGYN | Facility: CLINIC | Age: 37
End: 2022-07-29

## 2022-07-29 NOTE — TELEPHONE ENCOUNTER
Nena Peraza M.D.  Petra Sanchez, Med Ass't; Scarlet Richards R.N.; Dorinda Barnes R.N.  Petra,   We just added openings for next week and the 10th.   However pt lives in [place] so not sure how long she is in [place] and whether she wants a workup here done where it might be better in [place]?   This looks like a problem started in [place] and that is the best place for work up and treatment. Unless she is in [place] for months? Please get more info?     7/29/22@11:10am. N/a. msg left to call back if she decides to follow up with us.    8/1/22............................................................................  Nena Peraza M.D.  Petra Sanchez, Med Ass't  Ok yes             Previous Messages       ----- Message -----   From: Petra Sanchez, Med Ass't   Sent: 8/1/2022  11:41 AM PDT   To: Nena Peraza M.D.     This is an ER f/u. I wanted us to ask her if she is planning to follow up here or in [place]. Patient states she wanted to do it here, so if okay with you I will have one the PARs contact her and get her schedule.   MRN given to Malinda to lalit    8/3/22 @ 3:25 pm . Patient scheduled for 8/10/22

## 2022-08-10 ENCOUNTER — HOSPITAL ENCOUNTER (OUTPATIENT)
Facility: MEDICAL CENTER | Age: 37
End: 2022-08-10
Attending: OBSTETRICS & GYNECOLOGY
Payer: MEDICAID

## 2022-08-10 ENCOUNTER — GYNECOLOGY VISIT (OUTPATIENT)
Dept: OBGYN | Facility: CLINIC | Age: 37
End: 2022-08-10
Payer: MEDICAID

## 2022-08-10 VITALS
WEIGHT: 141 LBS | HEIGHT: 65 IN | DIASTOLIC BLOOD PRESSURE: 88 MMHG | BODY MASS INDEX: 23.49 KG/M2 | SYSTOLIC BLOOD PRESSURE: 145 MMHG

## 2022-08-10 DIAGNOSIS — Z11.3 SCREENING FOR VENEREAL DISEASE: ICD-10-CM

## 2022-08-10 DIAGNOSIS — N83.8 OVARIAN MASS, RIGHT: ICD-10-CM

## 2022-08-10 DIAGNOSIS — Z12.4 SCREENING FOR CERVICAL CANCER: ICD-10-CM

## 2022-08-10 DIAGNOSIS — N83.8 OVARIAN MASS, LEFT: ICD-10-CM

## 2022-08-10 PROCEDURE — 87591 N.GONORRHOEAE DNA AMP PROB: CPT

## 2022-08-10 PROCEDURE — 87480 CANDIDA DNA DIR PROBE: CPT

## 2022-08-10 PROCEDURE — 87660 TRICHOMONAS VAGIN DIR PROBE: CPT

## 2022-08-10 PROCEDURE — 87491 CHLMYD TRACH DNA AMP PROBE: CPT

## 2022-08-10 PROCEDURE — 99203 OFFICE O/P NEW LOW 30 MIN: CPT | Performed by: OBSTETRICS & GYNECOLOGY

## 2022-08-10 PROCEDURE — 87510 GARDNER VAG DNA DIR PROBE: CPT

## 2022-08-10 PROCEDURE — 87624 HPV HI-RISK TYP POOLED RSLT: CPT

## 2022-08-10 PROCEDURE — 88175 CYTOPATH C/V AUTO FLUID REDO: CPT

## 2022-08-10 ASSESSMENT — FIBROSIS 4 INDEX: FIB4 SCORE: 0.9

## 2022-08-10 NOTE — PROGRESS NOTES
37 y.o.     Female presents as new patient for pelvic pain. Pt reports pain started about 8 months ago. Pt reports back in January having a lot of bleeding and severe cramping. Then it resolved and since she has had pain about every other day. She reports she has been able to feel her belly growing over time. Pain comes and goes. When pain is present it is a lot of cramping.     Pt was seen by a PCP in [place] and asked to f/u with a GYN. She has not done that til today. She did go ot the ER on 2022. TVUS showed b/l ovarian massess. Findings:     OVARIES:     Large structure in the right adnexa is seen measuring 9.6 x 9.6 x 10.0 cm which may represent the right ovary. Duplex Doppler examination of the right ovary shows normal waveforms. There is 8.4 x 8.2 x 9.2 cm echogenic structure in this adnexal   structure.     Large structure in the left adnexa seen measuring 13.8 x 10.6 x 12.2 cm. Duplex Doppler examination of the left ovary shows normal waveforms. Echogenic mass lesion within this structure is seen measuring 12.4 x 10.1 cm.     Trace free fluid in the right adnexa is seen.    Nml uterus.     LMP:  Patient's last menstrual period was 2022.. Pt reports her periods are monthly, heavy yes, amt 4-5 pads/day, heavier than normal, painful yes - takes ibuprofen and that helps . Pt is currently using none for birth control. Not actively trying to get pregnant but would be happy to be pregnant. Pt likes this method. Pt Denies pain with intercourse. Pt does reports some spotting or bleeding after intercourse, doesn't last long.  Pt denies abnormal vaginal discharge, itching or odor.  Denies any recent STD screening. Last pap smear 4 years ago, nml, neg HPV. Reports a remote h/o HPV.       Fmhx: pt reports her sister had breast CA at age 30s. She is now 42. She is BRCA +. Pt nor her mother have been tested.       Vaccines:   COVID vaccine: No   Flu vaccine: No   HPV vaccine:  Yes      ROS:  Neurological: Denies  Breast: Denies breast tenderness, mass, discharge, changes in size or contour, or abnormal cyclic discomfort.  Gastrointestinal: Negative for abdominal discomfort, blood in stools or black stools  Genito-urinary: Negative for dysuria, frequency and incontinence  All other systems reviewed : and are Negative    PMH:  Past Medical History:   Diagnosis Date    ASTHMA        No past surgical history on file.    Past GYN hx:  None    Family History   Problem Relation Age of Onset    Breast Cancer Sister     Cancer Maternal Grandmother         breast cancer        Social History     Socioeconomic History    Marital status:      Spouse name: Not on file    Number of children: Not on file    Years of education: Not on file    Highest education level: Not on file   Occupational History    Not on file   Tobacco Use    Smoking status: Never    Smokeless tobacco: Never   Vaping Use    Vaping Use: Never used   Substance and Sexual Activity    Alcohol use: Yes     Comment: occ    Drug use: No    Sexual activity: Yes     Partners: Male   Other Topics Concern    Not on file   Social History Narrative    Not on file     Social Determinants of Health     Financial Resource Strain: Not on file   Food Insecurity: Not on file   Transportation Needs: Not on file   Physical Activity: Not on file   Stress: Not on file   Social Connections: Not on file   Intimate Partner Violence: Not on file   Housing Stability: Not on file       Current Outpatient Medications on File Prior to Visit   Medication Sig Dispense Refill    Naloxone (NARCAN) 4 MG/0.1ML Liquid Administer 4 mg into affected nostril(S) as needed (overdose). (Patient not taking: Reported on 8/10/2022) 1 Each 0    ciprofloxacin (CIPRO) 500 MG Tab Take 1 Tab by mouth 2 times a day. (Patient not taking: Reported on 8/10/2022) 20 Tab 0     No current facility-administered medications on file prior to visit.       Summary of Allergies:  No  "known drug allergy  BP (!) 145/88 (BP Location: Right arm, Patient Position: Sitting)   Ht 1.651 m (5' 5\")   Wt 64 kg (141 lb)   LMP 2022   BMI 23.46 kg/m²     Exam:  Skin: Warm and dry with no lesions or rashes.  Neuro: Awake, alert and oriented x 3  Abdominal : upper quadrants soft, lower abd disented, hard, tender to palpation c/w 17 week uterus.   Genito-Urinary:Perineum and external genitalia normal, Vagina normal w/o redness or discharge, Cervix w/o lesions or discharge, cervix difficult to fully visualize given pt discomfort, very posterior  Extremities:no cyanosis, clubbing or edema present    Psych: normal affect    Assessment and Plan:  37 y.o.  here for b/l ovarian masses.     -Pt aware she should get testing for BRCA. Given info on the healthy [place] project. Pt states she will start this immediately.  -Pap smear and STD screening sent today  -Would recommend MRI to further characterize masses. Pt will appt ASAP  -Pt to RTC about 2 weeks after imagaing for further planning/workup.  -Pt aware she should take a PNV if she is not using BC.   -all questions answered, pt agrees with plan      "

## 2022-08-11 LAB
CANDIDA DNA VAG QL PROBE+SIG AMP: NEGATIVE
G VAGINALIS DNA VAG QL PROBE+SIG AMP: POSITIVE
T VAGINALIS DNA VAG QL PROBE+SIG AMP: NEGATIVE

## 2022-08-12 ENCOUNTER — TELEPHONE (OUTPATIENT)
Dept: OBGYN | Facility: CLINIC | Age: 37
End: 2022-08-12

## 2022-08-12 LAB
C TRACH DNA GENITAL QL NAA+PROBE: POSITIVE
CYTOLOGY REG CYTOL: ABNORMAL
HPV HR 12 DNA CVX QL NAA+PROBE: POSITIVE
HPV16 DNA SPEC QL NAA+PROBE: NEGATIVE
HPV18 DNA SPEC QL NAA+PROBE: NEGATIVE
N GONORRHOEA DNA GENITAL QL NAA+PROBE: NEGATIVE
SPECIMEN SOURCE: ABNORMAL
SPECIMEN SOURCE: ABNORMAL

## 2022-08-12 RX ORDER — METRONIDAZOLE 500 MG/1
500 TABLET ORAL 2 TIMES DAILY
Qty: 14 TABLET | Refills: 0 | Status: SHIPPED | OUTPATIENT
Start: 2022-08-12 | End: 2022-08-19

## 2022-08-12 NOTE — TELEPHONE ENCOUNTER
LVMTCB    ----- Message from Latricia Lemos M.D. sent at 8/12/2022  9:00 AM PDT -----  This pt have BV. Can you call her and let her know please.

## 2022-08-15 ENCOUNTER — TELEPHONE (OUTPATIENT)
Dept: OBGYN | Facility: CLINIC | Age: 37
End: 2022-08-15

## 2022-08-15 RX ORDER — AZITHROMYCIN 500 MG/1
1000 TABLET, FILM COATED ORAL ONCE
Qty: 2 TABLET | Refills: 0 | Status: SHIPPED | OUTPATIENT
Start: 2022-08-15 | End: 2022-08-15 | Stop reason: SDUPTHER

## 2022-08-15 NOTE — TELEPHONE ENCOUNTER
Contacted patient per Dr. Lemos to relay lab results and obtain partners information to send medication for treatment as well. Patient verbalized understanding and had no further questions at this time. Medication for partner will called to the [facility] on [address]     ----- Message from Latricia Lemos M.D. sent at 8/15/2022  8:34 AM PDT -----  Can you let this pt know she needs another pap smear in 1 year. She also ahs chlymydia. Both her and her partner need to be treated. WE can call in a Rx if we get his name, , ensure he has no allergies.

## 2022-08-17 RX ORDER — AZITHROMYCIN 500 MG/1
1000 TABLET, FILM COATED ORAL ONCE
Qty: 2 TABLET | Refills: 0 | Status: SHIPPED | OUTPATIENT
Start: 2022-08-17 | End: 2022-08-17

## 2022-08-26 ENCOUNTER — HOSPITAL ENCOUNTER (OUTPATIENT)
Dept: RADIOLOGY | Facility: MEDICAL CENTER | Age: 37
End: 2022-08-26
Attending: OBSTETRICS & GYNECOLOGY
Payer: MEDICAID

## 2022-08-26 DIAGNOSIS — N83.8 OVARIAN MASS, LEFT: ICD-10-CM

## 2022-08-26 DIAGNOSIS — N83.8 OVARIAN MASS, RIGHT: ICD-10-CM

## 2022-08-26 PROCEDURE — 700117 HCHG RX CONTRAST REV CODE 255: Performed by: OBSTETRICS & GYNECOLOGY

## 2022-08-26 PROCEDURE — A9576 INJ PROHANCE MULTIPACK: HCPCS | Performed by: OBSTETRICS & GYNECOLOGY

## 2022-08-26 PROCEDURE — 72197 MRI PELVIS W/O & W/DYE: CPT

## 2022-08-26 RX ADMIN — GADOTERIDOL 14 ML: 279.3 INJECTION, SOLUTION INTRAVENOUS at 09:21

## 2022-09-13 ENCOUNTER — GYNECOLOGY VISIT (OUTPATIENT)
Dept: OBGYN | Facility: CLINIC | Age: 37
End: 2022-09-13
Payer: MEDICAID

## 2022-09-13 ENCOUNTER — HOSPITAL ENCOUNTER (OUTPATIENT)
Dept: LAB | Facility: MEDICAL CENTER | Age: 37
End: 2022-09-13
Attending: OBSTETRICS & GYNECOLOGY
Payer: MEDICAID

## 2022-09-13 VITALS
SYSTOLIC BLOOD PRESSURE: 160 MMHG | DIASTOLIC BLOOD PRESSURE: 110 MMHG | BODY MASS INDEX: 24.06 KG/M2 | WEIGHT: 144.6 LBS

## 2022-09-13 DIAGNOSIS — R19.00 PELVIC MASS: ICD-10-CM

## 2022-09-13 LAB — CANCER AG125 SERPL-ACNC: 84.9 U/ML (ref 0–35)

## 2022-09-13 PROCEDURE — 99213 OFFICE O/P EST LOW 20 MIN: CPT | Performed by: OBSTETRICS & GYNECOLOGY

## 2022-09-13 PROCEDURE — 86304 IMMUNOASSAY TUMOR CA 125: CPT

## 2022-09-13 PROCEDURE — 36415 COLL VENOUS BLD VENIPUNCTURE: CPT

## 2022-09-13 ASSESSMENT — FIBROSIS 4 INDEX: FIB4 SCORE: 0.9

## 2022-09-13 NOTE — PROGRESS NOTES
Patient here for GYN follow up   Last seen on: 8/10/2022  Pt states has been in so much that she is double up can hardly stand.

## 2022-09-13 NOTE — PROGRESS NOTES
Chief Complaint   Patient presents with    Gynecologic Exam     Follow up results.    Chief complaint: Follow-up pelvic mass    History of present illness:   37 y.o.  presents with above chief complaint.  Patient presents for follow-up today.  She was found to have bilateral ovarian masses on ultrasound.  MRI also performed shows large mass discomfort in 15 and 12 cm.  Suspicious for possible endometriomas.  Patient reports significant abdominal pain from them which are affecting her quality of life.    They originally noticed approximately a year ago.  She was seen in the ER in [place] both in February and approximately April or May of this year.  In 2022 she was seen here in renal.  It does appear these masses have been enlarging since her last visit.  She had an episode of severe and sharp pelvic pain approximately 2 days ago.    ROS: Pertinent positives documented in HPI and all other systems reviewed & are negative    POBHx:  2 para 2-0-0-2    PGYNHx: As above    All PMH, PSH, meds, allergies, social history and FH reviewed and updated today:  Past Medical History:   Diagnosis Date    ASTHMA        No past surgical history on file.    Allergies:   Allergies   Allergen Reactions    No Known Drug Allergy        Social History     Socioeconomic History    Marital status:      Spouse name: Not on file    Number of children: Not on file    Years of education: Not on file    Highest education level: Not on file   Occupational History    Not on file   Tobacco Use    Smoking status: Never    Smokeless tobacco: Never   Vaping Use    Vaping Use: Never used   Substance and Sexual Activity    Alcohol use: Yes     Comment: occ    Drug use: No    Sexual activity: Yes     Partners: Male   Other Topics Concern    Not on file   Social History Narrative    Not on file     Social Determinants of Health     Financial Resource Strain: Not on file   Food Insecurity: Not on file   Transportation Needs:  Not on file   Physical Activity: Not on file   Stress: Not on file   Social Connections: Not on file   Intimate Partner Violence: Not on file   Housing Stability: Not on file       Family History   Problem Relation Age of Onset    Breast Cancer Sister     Cancer Maternal Grandmother         breast cancer        Physical exam:  BP (!) 144/96   Wt 65.6 kg (144 lb 9.6 oz)     GENERAL APPEARANCE: healthy, alert  NECK nontender, no masses, thyromegaly or nodules  ABDOMEN Abdomen soft, non-tender. BS normal.  Abdomen is distended with masses palpated all the way up to the umbilicus.  It appears his masses are filling up the entire abdominal space up to the umbilicus.  Extend both to the abdominal wall on left and right sides.  Mass appears mobile, nontender to palpation.  FEMALE GYN: Deferred   EXTREMITIES:negative clubbing, cyanosis, edema    NEURO Awake, alert and oriented x 3, Normal gait, no sensory deficits  SKIN No rashes, or ulcers or lesions seen  PSYCHIATRIC: Patient shows appropriate affect, is alert and oriented x3, intact judgment and insight.      Assessment:  1. Pelvic mass            Plan: Large bilateral ovarian masses noted.  Appear to be enlarging.  Suspicion of endometrioma on ultrasound and MRI.     ordered.  Discussed the implications of elevated levels with the patient.    Plan at this time is to proceed with surgical management as these masses appear to be enlarging and causing the patient discomfort and affecting her quality of life.  Given the size of the masses, minimally invasive surgery in the form of laparoscopy is not recommended at this time.  Plan for exploratory laparotomy with bilateral ovarian cystectomy.  Patient would like to maintain the possibility of pregnancy in the future.  At this time we will attempt a cystectomy unless  is elevated or there are any other signs of malignancy    Referral for surgery sent.  Follow-up for preoperative visit

## 2022-09-16 ENCOUNTER — HOSPITAL ENCOUNTER (OUTPATIENT)
Facility: MEDICAL CENTER | Age: 37
End: 2022-09-16
Attending: OBSTETRICS & GYNECOLOGY | Admitting: OBSTETRICS & GYNECOLOGY

## 2022-09-22 ENCOUNTER — GYNECOLOGY VISIT (OUTPATIENT)
Dept: OBGYN | Facility: CLINIC | Age: 37
End: 2022-09-22
Payer: MEDICAID

## 2022-09-22 VITALS — SYSTOLIC BLOOD PRESSURE: 184 MMHG | WEIGHT: 141 LBS | DIASTOLIC BLOOD PRESSURE: 121 MMHG | BODY MASS INDEX: 23.46 KG/M2

## 2022-09-22 DIAGNOSIS — N83.8 MASS OF OVARY: ICD-10-CM

## 2022-09-22 PROCEDURE — 99999 PR NO CHARGE: CPT | Performed by: OBSTETRICS & GYNECOLOGY

## 2022-09-22 RX ORDER — NIFEDIPINE 30 MG
30 TABLET, EXTENDED RELEASE ORAL DAILY
Qty: 30 TABLET | Refills: 2 | Status: SHIPPED | OUTPATIENT
Start: 2022-09-22 | End: 2022-11-23

## 2022-09-22 ASSESSMENT — FIBROSIS 4 INDEX: FIB4 SCORE: 0.9

## 2022-09-22 NOTE — PROGRESS NOTES
Chief complaint: Discussion of results    37-year-old  2 para 2-0-0-2 was found to have bilateral ovarian masses.  Originally scheduled for exploratory laparotomy with mass removal next week.  Unfortunately,  was elevated at 84.9.    Case was discussed with GYN oncologist, who recommended referral to their clinic for evaluation and surgical removal.    I discussed this with the patient today.  The original surgery next week has been canceled and emergency referral has been sent.  Patient has been given contact information for GYN oncology office.    Patient noted to have elevated blood pressure again.  In order to expedite her delivery, I did begin treatment with nifedipine at this time.  Patient was encouraged once again to start care with a primary care provider.    All questions answered

## 2022-09-22 NOTE — NON-PROVIDER
Pt here for Pre-Op: Exploratory Laparectomy.  GO Over Results  Good# [phone] (home)   Pharmacy verified

## 2022-09-28 ENCOUNTER — TELEPHONE (OUTPATIENT)
Dept: OBGYN | Facility: CLINIC | Age: 37
End: 2022-09-28
Payer: MEDICAID

## 2022-09-28 ENCOUNTER — APPOINTMENT (OUTPATIENT)
Dept: RADIOLOGY | Facility: MEDICAL CENTER | Age: 37
End: 2022-09-28
Attending: EMERGENCY MEDICINE
Payer: MEDICAID

## 2022-09-28 ENCOUNTER — HOSPITAL ENCOUNTER (EMERGENCY)
Facility: MEDICAL CENTER | Age: 37
End: 2022-09-28
Attending: EMERGENCY MEDICINE
Payer: MEDICAID

## 2022-09-28 VITALS
TEMPERATURE: 98.5 F | HEIGHT: 65 IN | OXYGEN SATURATION: 96 % | SYSTOLIC BLOOD PRESSURE: 187 MMHG | BODY MASS INDEX: 24.72 KG/M2 | HEART RATE: 69 BPM | WEIGHT: 148.37 LBS | RESPIRATION RATE: 20 BRPM | DIASTOLIC BLOOD PRESSURE: 115 MMHG

## 2022-09-28 DIAGNOSIS — R10.2 PELVIC PAIN: ICD-10-CM

## 2022-09-28 LAB
ABO GROUP BLD: NORMAL
ANION GAP SERPL CALC-SCNC: 15 MMOL/L (ref 7–16)
BASOPHILS # BLD AUTO: 0.4 % (ref 0–1.8)
BASOPHILS # BLD: 0.04 K/UL (ref 0–0.12)
BLD GP AB SCN SERPL QL: NORMAL
BUN SERPL-MCNC: 10 MG/DL (ref 8–22)
CALCIUM SERPL-MCNC: 9.1 MG/DL (ref 8.5–10.5)
CHLORIDE SERPL-SCNC: 101 MMOL/L (ref 96–112)
CO2 SERPL-SCNC: 21 MMOL/L (ref 20–33)
CREAT SERPL-MCNC: 0.85 MG/DL (ref 0.5–1.4)
EKG IMPRESSION: NORMAL
EOSINOPHIL # BLD AUTO: 0.14 K/UL (ref 0–0.51)
EOSINOPHIL NFR BLD: 1.4 % (ref 0–6.9)
ERYTHROCYTE [DISTWIDTH] IN BLOOD BY AUTOMATED COUNT: 42.1 FL (ref 35.9–50)
GFR SERPLBLD CREATININE-BSD FMLA CKD-EPI: 90 ML/MIN/1.73 M 2
GLUCOSE SERPL-MCNC: 114 MG/DL (ref 65–99)
HCT VFR BLD AUTO: 32.9 % (ref 37–47)
HGB BLD-MCNC: 10 G/DL (ref 12–16)
IMM GRANULOCYTES # BLD AUTO: 0.05 K/UL (ref 0–0.11)
IMM GRANULOCYTES NFR BLD AUTO: 0.5 % (ref 0–0.9)
LYMPHOCYTES # BLD AUTO: 1.7 K/UL (ref 1–4.8)
LYMPHOCYTES NFR BLD: 16.9 % (ref 22–41)
MCH RBC QN AUTO: 20.8 PG (ref 27–33)
MCHC RBC AUTO-ENTMCNC: 30.4 G/DL (ref 33.6–35)
MCV RBC AUTO: 68.5 FL (ref 81.4–97.8)
MONOCYTES # BLD AUTO: 0.54 K/UL (ref 0–0.85)
MONOCYTES NFR BLD AUTO: 5.4 % (ref 0–13.4)
NEUTROPHILS # BLD AUTO: 7.56 K/UL (ref 2–7.15)
NEUTROPHILS NFR BLD: 75.4 % (ref 44–72)
NRBC # BLD AUTO: 0 K/UL
NRBC BLD-RTO: 0 /100 WBC
PLATELET # BLD AUTO: 318 K/UL (ref 164–446)
PMV BLD AUTO: 11 FL (ref 9–12.9)
POTASSIUM SERPL-SCNC: 4.3 MMOL/L (ref 3.6–5.5)
RBC # BLD AUTO: 4.8 M/UL (ref 4.2–5.4)
RH BLD: NORMAL
SODIUM SERPL-SCNC: 137 MMOL/L (ref 135–145)
WBC # BLD AUTO: 10 K/UL (ref 4.8–10.8)

## 2022-09-28 PROCEDURE — 71045 X-RAY EXAM CHEST 1 VIEW: CPT

## 2022-09-28 PROCEDURE — 86901 BLOOD TYPING SEROLOGIC RH(D): CPT

## 2022-09-28 PROCEDURE — 80048 BASIC METABOLIC PNL TOTAL CA: CPT

## 2022-09-28 PROCEDURE — 99284 EMERGENCY DEPT VISIT MOD MDM: CPT

## 2022-09-28 PROCEDURE — 86850 RBC ANTIBODY SCREEN: CPT

## 2022-09-28 PROCEDURE — 86900 BLOOD TYPING SEROLOGIC ABO: CPT

## 2022-09-28 PROCEDURE — 36415 COLL VENOUS BLD VENIPUNCTURE: CPT

## 2022-09-28 PROCEDURE — 85025 COMPLETE CBC W/AUTO DIFF WBC: CPT

## 2022-09-28 PROCEDURE — 700111 HCHG RX REV CODE 636 W/ 250 OVERRIDE (IP): Performed by: EMERGENCY MEDICINE

## 2022-09-28 PROCEDURE — 96374 THER/PROPH/DIAG INJ IV PUSH: CPT

## 2022-09-28 PROCEDURE — 96375 TX/PRO/DX INJ NEW DRUG ADDON: CPT

## 2022-09-28 PROCEDURE — 93005 ELECTROCARDIOGRAM TRACING: CPT | Performed by: EMERGENCY MEDICINE

## 2022-09-28 RX ORDER — HYDROCODONE BITARTRATE AND ACETAMINOPHEN 5; 325 MG/1; MG/1
1-2 TABLET ORAL EVERY 6 HOURS PRN
Qty: 20 TABLET | Refills: 0 | Status: SHIPPED | OUTPATIENT
Start: 2022-09-28 | End: 2022-10-05

## 2022-09-28 RX ORDER — ONDANSETRON 2 MG/ML
4 INJECTION INTRAMUSCULAR; INTRAVENOUS ONCE
Status: COMPLETED | OUTPATIENT
Start: 2022-09-28 | End: 2022-09-28

## 2022-09-28 RX ADMIN — FENTANYL CITRATE 100 MCG: 50 INJECTION, SOLUTION INTRAMUSCULAR; INTRAVENOUS at 14:08

## 2022-09-28 RX ADMIN — ONDANSETRON 4 MG: 2 INJECTION INTRAMUSCULAR; INTRAVENOUS at 14:08

## 2022-09-28 ASSESSMENT — FIBROSIS 4 INDEX: FIB4 SCORE: 0.9

## 2022-09-28 NOTE — TELEPHONE ENCOUNTER
Patients mother called in stating pt has been in excruciating pain and is now throwing up due to the pain, after consulting with  pt should be seen at the ER immediately where  is on call. Lissette verbalized understanding and will drive pt to ER.

## 2022-09-28 NOTE — ED PROVIDER NOTES
"ED Provider Note    Scribed for Robel Zapata M.D. by Robel Zapata M.D.. 9/28/2022,  1:31 PM.    CHIEF COMPLAINT  Chief Complaint   Patient presents with    Cyst     Pt was sent by MD after patient awoke in the middle of the night with 10/10 pain bilateral pelvic pain, pt was told to come here for \"emergency surgery.\"       HPI  Lissette Lee is a 37 y.o. female who presents to the Emergency Department because of worsening bilateral lower quadrant pelvic pain, in the setting of being on very large endometriomas.  Her OB/GYN, Louann Smart, is planning exploratory laparotomy with mass removal, but the patient's recent blood work showed an elevation in .  With concern for cancer, the patient was referred to Dr. Eliseo Briscoe, but has not yet had the appointment, and is here because of worsening pain.  Patient was referred from her OB/GYN office to the emergency department, where Dr. Wellington is on-call.  Pain has been progressive for several weeks.  She does not complain of fevers or chills or dysuria, or any respiratory symptoms.  She has been trying to manage pain with ibuprofen, with very little improvement.    REVIEW OF SYSTEMS  See HPI for further details. All other systems are negative.     PAST MEDICAL HISTORY   has a past medical history of ASTHMA.    SOCIAL HISTORY  Social History     Tobacco Use    Smoking status: Never    Smokeless tobacco: Never   Vaping Use    Vaping Use: Never used   Substance and Sexual Activity    Alcohol use: Yes     Comment: occ    Drug use: No    Sexual activity: Yes     Partners: Male     Social History     Substance and Sexual Activity   Drug Use No       SURGICAL HISTORY  patient denies any surgical history    CURRENT MEDICATIONS  Home Medications       Reviewed by Scarlet Foley R.N. (Registered Nurse) on 09/28/22 at 1220  Med List Status: Not Addressed     Medication Last Dose Status   ciprofloxacin (CIPRO) 500 MG Tab  Active   Naloxone (NARCAN) 4 MG/0.1ML " "Liquid  Active   NIFEdipine (ADALAT CC) 30 MG CR tablet  Active                    ALLERGIES  Allergies   Allergen Reactions    No Known Drug Allergy        PHYSICAL EXAM  VITAL SIGNS: BP (!) 187/115   Pulse 69   Temp 37.3 °C (99.1 °F) (Temporal)   Resp (!) 22   Ht 1.651 m (5' 5\")   Wt 67.3 kg (148 lb 5.9 oz)   LMP 09/14/2022 (Exact Date)   SpO2 96%   BMI 24.69 kg/m²   Pulse ox interpretation: I interpret this pulse ox as normal.  Constitutional: Alert in no apparent distress.  HENT: No signs of trauma, Bilateral external ears normal, Nose normal.   Eyes: Conjunctiva normal, Non-icteric.   Neck: Normal range of motion, Supple, No stridor.   Lymphatic: No lymphadenopathy noted.   Cardiovascular: Regular rate and rhythm, no murmurs.   Thorax & Lungs: Normal breath sounds, No respiratory distress, No wheezing, No chest tenderness.   Abdomen: Bowel sounds normal, Soft, No tenderness, No masses, No pulsatile masses. No peritoneal signs.  Skin: Warm, Dry, No erythema, No rash.   Extremities: Intact distal pulses, No edema, No cyanosis.  Musculoskeletal: Good range of motion in all major joints. No or major deformities noted.   Neurologic: Alert , Normal motor function, Normal sensory function, No focal deficits noted.   Psychiatric: Affect normal, Judgment normal, Mood normal.     DIAGNOSTIC STUDIES / PROCEDURES      LABS  Labs Reviewed   CBC WITH DIFFERENTIAL - Abnormal; Notable for the following components:       Result Value    Hemoglobin 10.0 (*)     Hematocrit 32.9 (*)     MCV 68.5 (*)     MCH 20.8 (*)     MCHC 30.4 (*)     Neutrophils-Polys 75.40 (*)     Lymphocytes 16.90 (*)     Neutrophils (Absolute) 7.56 (*)     All other components within normal limits   BASIC METABOLIC PANEL - Abnormal; Notable for the following components:    Glucose 114 (*)     All other components within normal limits   ESTIMATED GFR   COD (ADULT)     All labs reviewed by me.    RADIOLOGY  DX-CHEST-LIMITED (1 VIEW)   Final Result    "   No acute cardiopulmonary disease.        The radiologist's interpretation of all radiological studies have been reviewed by me.    COURSE & MEDICAL DECISION MAKING  Nursing notes, VS, PMSFHx reviewed in chart.     1:31 PM Patient seen and examined at bedside.  She has known large endometriomas, with more recent concern for endometrial cancer.  She is followed by OB/GYN, who is consulting with GYN oncology.  It sounds like there is a nonemergent plan for surgery within this hospitalization, so I will order preop testing, and treat the patient's pain with fentanyl and Zofran. I will page [facility].    2:04 PM after speaking with Dr. Nguyen, she offered to speak with Dr. Briscoe, and decide who will do the patient's surgery.    3:11 PM Dr. Briscoe returned my page, and feels the patient can be safely and appropriately discharged in the emergency department for very close clinic follow-up, with surgery as scheduled, likely, for early next week.  I discussed with the patient and her partner at the bedside.  She is in agreement with this plan of care, with more aggressive pain control at home.     The patient will return for new or worsening symptoms and is stable at the time of discharge.    The patient is referred to a primary physician for blood pressure management, diabetic screening, and for all other preventative health concerns.      DISPOSITION:  Patient will be discharged home in stable condition.    FOLLOW UP:  [facility]  [address]  [address]  [place] [id number]  [phone]    Call in 1 day  To schedule an appointment.      OUTPATIENT MEDICATIONS:  New Prescriptions    HYDROCODONE-ACETAMINOPHEN (NORCO) 5-325 MG TAB PER TABLET    Take 1-2 Tablets by mouth every 6 hours as needed (breakthrough pain) for up to 7 days.           FINAL IMPRESSION  1. Pelvic pain

## 2022-09-28 NOTE — ED NOTES
Verbalizes understanding of discharge and followup instructions. PIV removed, VSS. Given information for electronically sent Rx. Ambulates with steady gait to discharge with significant other.

## 2022-09-28 NOTE — DISCHARGE INSTRUCTIONS
Dr. Briscoe is aware that you to be seen very soon.  His office should be aware of that, and if there is any question, please let them know that the emergency department spoke to Dr. Briscoe today.    For pain, continue 600 mg ibuprofen with milk or food every 6 hours.  In between those doses, take 1 or 2 of the prescribed Norco.

## 2022-09-28 NOTE — ED TRIAGE NOTES
"Chief Complaint   Patient presents with    Cyst     Pt was sent by MD from outpatient office after patient was seen there for her bilateral pelvic pain. Pt awoke in the middle of the night with 10/10 pain, pt was told to come here for \"emergency surgery.\"     BP (!) 200/150   Pulse 87   Temp 37.3 °C (99.1 °F) (Temporal)   Resp (!) 22   Ht 1.651 m (5' 5\")   Wt 67.3 kg (148 lb 5.9 oz)   LMP 09/14/2022 (Exact Date)   SpO2 100%   BMI 24.69 kg/m²         "

## 2022-10-11 ENCOUNTER — PRE-ADMISSION TESTING (OUTPATIENT)
Dept: ADMISSIONS | Facility: MEDICAL CENTER | Age: 37
End: 2022-10-11
Attending: SPECIALIST
Payer: MEDICAID

## 2022-10-11 DIAGNOSIS — Z01.812 PRE-OPERATIVE LABORATORY EXAMINATION: ICD-10-CM

## 2022-10-11 LAB
ABO GROUP BLD: NORMAL
ALBUMIN SERPL BCP-MCNC: 4.4 G/DL (ref 3.2–4.9)
ALBUMIN/GLOB SERPL: 1.5 G/DL
ALP SERPL-CCNC: 75 U/L (ref 30–99)
ALT SERPL-CCNC: 26 U/L (ref 2–50)
ANION GAP SERPL CALC-SCNC: 11 MMOL/L (ref 7–16)
APTT PPP: 35.6 SEC (ref 24.7–36)
AST SERPL-CCNC: 23 U/L (ref 12–45)
BASOPHILS # BLD AUTO: 0.5 % (ref 0–1.8)
BASOPHILS # BLD: 0.03 K/UL (ref 0–0.12)
BILIRUB SERPL-MCNC: 0.2 MG/DL (ref 0.1–1.5)
BLD GP AB SCN SERPL QL: NORMAL
BUN SERPL-MCNC: 17 MG/DL (ref 8–22)
CALCIUM SERPL-MCNC: 8.9 MG/DL (ref 8.5–10.5)
CANCER AG125 SERPL-ACNC: 90.1 U/ML (ref 0–35)
CHLORIDE SERPL-SCNC: 102 MMOL/L (ref 96–112)
CO2 SERPL-SCNC: 24 MMOL/L (ref 20–33)
CREAT SERPL-MCNC: 0.87 MG/DL (ref 0.5–1.4)
EOSINOPHIL # BLD AUTO: 0.23 K/UL (ref 0–0.51)
EOSINOPHIL NFR BLD: 3.8 % (ref 0–6.9)
ERYTHROCYTE [DISTWIDTH] IN BLOOD BY AUTOMATED COUNT: 42.3 FL (ref 35.9–50)
GFR SERPLBLD CREATININE-BSD FMLA CKD-EPI: 88 ML/MIN/1.73 M 2
GLOBULIN SER CALC-MCNC: 2.9 G/DL (ref 1.9–3.5)
GLUCOSE SERPL-MCNC: 68 MG/DL (ref 65–99)
HCG SERPL QL: NEGATIVE
HCT VFR BLD AUTO: 29.6 % (ref 37–47)
HGB BLD-MCNC: 9 G/DL (ref 12–16)
IMM GRANULOCYTES # BLD AUTO: 0.02 K/UL (ref 0–0.11)
IMM GRANULOCYTES NFR BLD AUTO: 0.3 % (ref 0–0.9)
INR PPP: 1.02 (ref 0.87–1.13)
LYMPHOCYTES # BLD AUTO: 1.25 K/UL (ref 1–4.8)
LYMPHOCYTES NFR BLD: 20.4 % (ref 22–41)
MCH RBC QN AUTO: 21 PG (ref 27–33)
MCHC RBC AUTO-ENTMCNC: 30.4 G/DL (ref 33.6–35)
MCV RBC AUTO: 69 FL (ref 81.4–97.8)
MONOCYTES # BLD AUTO: 0.56 K/UL (ref 0–0.85)
MONOCYTES NFR BLD AUTO: 9.2 % (ref 0–13.4)
NEUTROPHILS # BLD AUTO: 4.03 K/UL (ref 2–7.15)
NEUTROPHILS NFR BLD: 65.8 % (ref 44–72)
NRBC # BLD AUTO: 0 K/UL
NRBC BLD-RTO: 0 /100 WBC
PLATELET # BLD AUTO: 260 K/UL (ref 164–446)
PMV BLD AUTO: 10.9 FL (ref 9–12.9)
POTASSIUM SERPL-SCNC: 4.3 MMOL/L (ref 3.6–5.5)
PROT SERPL-MCNC: 7.3 G/DL (ref 6–8.2)
PROTHROMBIN TIME: 13.3 SEC (ref 12–14.6)
RBC # BLD AUTO: 4.29 M/UL (ref 4.2–5.4)
RH BLD: NORMAL
SODIUM SERPL-SCNC: 137 MMOL/L (ref 135–145)
WBC # BLD AUTO: 6.1 K/UL (ref 4.8–10.8)

## 2022-10-11 PROCEDURE — 85025 COMPLETE CBC W/AUTO DIFF WBC: CPT

## 2022-10-11 PROCEDURE — 86850 RBC ANTIBODY SCREEN: CPT

## 2022-10-11 PROCEDURE — 86900 BLOOD TYPING SEROLOGIC ABO: CPT

## 2022-10-11 PROCEDURE — 85730 THROMBOPLASTIN TIME PARTIAL: CPT

## 2022-10-11 PROCEDURE — 86304 IMMUNOASSAY TUMOR CA 125: CPT

## 2022-10-11 PROCEDURE — 80053 COMPREHEN METABOLIC PANEL: CPT

## 2022-10-11 PROCEDURE — 36415 COLL VENOUS BLD VENIPUNCTURE: CPT

## 2022-10-11 PROCEDURE — 86901 BLOOD TYPING SEROLOGIC RH(D): CPT

## 2022-10-11 PROCEDURE — 85610 PROTHROMBIN TIME: CPT

## 2022-10-11 PROCEDURE — 84703 CHORIONIC GONADOTROPIN ASSAY: CPT

## 2022-10-11 RX ORDER — IBUPROFEN 200 MG
800 TABLET ORAL EVERY 6 HOURS PRN
COMMUNITY

## 2022-10-11 RX ORDER — HYDROCODONE BITARTRATE AND ACETAMINOPHEN 5; 325 MG/1; MG/1
1 TABLET ORAL EVERY 6 HOURS PRN
COMMUNITY
Start: 2022-10-10

## 2022-10-11 ASSESSMENT — FIBROSIS 4 INDEX: FIB4 SCORE: 0.71

## 2022-10-13 ENCOUNTER — ANESTHESIA EVENT (OUTPATIENT)
Dept: SURGERY | Facility: MEDICAL CENTER | Age: 37
End: 2022-10-13
Payer: MEDICAID

## 2022-10-13 ENCOUNTER — HOSPITAL ENCOUNTER (OUTPATIENT)
Facility: MEDICAL CENTER | Age: 37
End: 2022-10-15
Attending: SPECIALIST | Admitting: SPECIALIST
Payer: MEDICAID

## 2022-10-13 ENCOUNTER — APPOINTMENT (OUTPATIENT)
Dept: RADIOLOGY | Facility: MEDICAL CENTER | Age: 37
End: 2022-10-13
Attending: STUDENT IN AN ORGANIZED HEALTH CARE EDUCATION/TRAINING PROGRAM
Payer: MEDICAID

## 2022-10-13 ENCOUNTER — ANESTHESIA (OUTPATIENT)
Dept: SURGERY | Facility: MEDICAL CENTER | Age: 37
End: 2022-10-13
Payer: MEDICAID

## 2022-10-13 PROBLEM — N80.9 ENDOMETRIOSIS: Status: ACTIVE | Noted: 2022-10-13

## 2022-10-13 LAB — GLUCOSE BLD STRIP.AUTO-MCNC: 85 MG/DL (ref 65–99)

## 2022-10-13 PROCEDURE — 88304 TISSUE EXAM BY PATHOLOGIST: CPT

## 2022-10-13 PROCEDURE — 88331 PATH CONSLTJ SURG 1 BLK 1SPC: CPT

## 2022-10-13 PROCEDURE — 700101 HCHG RX REV CODE 250: Performed by: ANESTHESIOLOGY

## 2022-10-13 PROCEDURE — 160009 HCHG ANES TIME/MIN: Performed by: SPECIALIST

## 2022-10-13 PROCEDURE — C2617 STENT, NON-COR, TEM W/O DEL: HCPCS | Performed by: SPECIALIST

## 2022-10-13 PROCEDURE — 160042 HCHG SURGERY MINUTES - EA ADDL 1 MIN LEVEL 5: Performed by: SPECIALIST

## 2022-10-13 PROCEDURE — 700111 HCHG RX REV CODE 636 W/ 250 OVERRIDE (IP): Performed by: STUDENT IN AN ORGANIZED HEALTH CARE EDUCATION/TRAINING PROGRAM

## 2022-10-13 PROCEDURE — 88305 TISSUE EXAM BY PATHOLOGIST: CPT

## 2022-10-13 PROCEDURE — 88307 TISSUE EXAM BY PATHOLOGIST: CPT

## 2022-10-13 PROCEDURE — 700105 HCHG RX REV CODE 258: Performed by: SPECIALIST

## 2022-10-13 PROCEDURE — G0378 HOSPITAL OBSERVATION PER HR: HCPCS

## 2022-10-13 PROCEDURE — 160035 HCHG PACU - 1ST 60 MINS PHASE I: Performed by: SPECIALIST

## 2022-10-13 PROCEDURE — 700102 HCHG RX REV CODE 250 W/ 637 OVERRIDE(OP): Performed by: STUDENT IN AN ORGANIZED HEALTH CARE EDUCATION/TRAINING PROGRAM

## 2022-10-13 PROCEDURE — 160036 HCHG PACU - EA ADDL 30 MINS PHASE I: Performed by: SPECIALIST

## 2022-10-13 PROCEDURE — C1758 CATHETER, URETERAL: HCPCS | Performed by: SPECIALIST

## 2022-10-13 PROCEDURE — 88112 CYTOPATH CELL ENHANCE TECH: CPT | Mod: 91

## 2022-10-13 PROCEDURE — 502714 HCHG ROBOTIC SURGERY SERVICES: Performed by: SPECIALIST

## 2022-10-13 PROCEDURE — 00840 ANES IPER PX LOWER ABD NOS: CPT | Performed by: ANESTHESIOLOGY

## 2022-10-13 PROCEDURE — 96375 TX/PRO/DX INJ NEW DRUG ADDON: CPT | Mod: XU

## 2022-10-13 PROCEDURE — 160031 HCHG SURGERY MINUTES - 1ST 30 MINS LEVEL 5: Performed by: SPECIALIST

## 2022-10-13 PROCEDURE — 700105 HCHG RX REV CODE 258: Performed by: STUDENT IN AN ORGANIZED HEALTH CARE EDUCATION/TRAINING PROGRAM

## 2022-10-13 PROCEDURE — 700101 HCHG RX REV CODE 250: Performed by: SPECIALIST

## 2022-10-13 PROCEDURE — 82962 GLUCOSE BLOOD TEST: CPT

## 2022-10-13 PROCEDURE — A9270 NON-COVERED ITEM OR SERVICE: HCPCS | Performed by: STUDENT IN AN ORGANIZED HEALTH CARE EDUCATION/TRAINING PROGRAM

## 2022-10-13 PROCEDURE — 96374 THER/PROPH/DIAG INJ IV PUSH: CPT | Mod: XU

## 2022-10-13 PROCEDURE — 700111 HCHG RX REV CODE 636 W/ 250 OVERRIDE (IP): Performed by: ANESTHESIOLOGY

## 2022-10-13 PROCEDURE — 160002 HCHG RECOVERY MINUTES (STAT): Performed by: SPECIALIST

## 2022-10-13 PROCEDURE — 160048 HCHG OR STATISTICAL LEVEL 1-5: Performed by: SPECIALIST

## 2022-10-13 PROCEDURE — 74018 RADEX ABDOMEN 1 VIEW: CPT

## 2022-10-13 PROCEDURE — C1769 GUIDE WIRE: HCPCS | Performed by: SPECIALIST

## 2022-10-13 DEVICE — STENT UROLOGICAL POLARIS 6X22  ULTRA: Type: IMPLANTABLE DEVICE | Site: URETER | Status: FUNCTIONAL

## 2022-10-13 RX ORDER — SODIUM CHLORIDE, SODIUM LACTATE, POTASSIUM CHLORIDE, CALCIUM CHLORIDE 600; 310; 30; 20 MG/100ML; MG/100ML; MG/100ML; MG/100ML
INJECTION, SOLUTION INTRAVENOUS CONTINUOUS
Status: ACTIVE | OUTPATIENT
Start: 2022-10-13 | End: 2022-10-13

## 2022-10-13 RX ORDER — HYDROMORPHONE HYDROCHLORIDE 1 MG/ML
0.1 INJECTION, SOLUTION INTRAMUSCULAR; INTRAVENOUS; SUBCUTANEOUS
Status: DISCONTINUED | OUTPATIENT
Start: 2022-10-13 | End: 2022-10-13 | Stop reason: HOSPADM

## 2022-10-13 RX ORDER — PHENYLEPHRINE HCL IN 0.9% NACL 0.5 MG/5ML
SYRINGE (ML) INTRAVENOUS PRN
Status: DISCONTINUED | OUTPATIENT
Start: 2022-10-13 | End: 2022-10-13 | Stop reason: SURG

## 2022-10-13 RX ORDER — MORPHINE SULFATE 4 MG/ML
2 INJECTION INTRAVENOUS
Status: DISCONTINUED | OUTPATIENT
Start: 2022-10-13 | End: 2022-10-15 | Stop reason: HOSPADM

## 2022-10-13 RX ORDER — ONDANSETRON 2 MG/ML
4 INJECTION INTRAMUSCULAR; INTRAVENOUS EVERY 6 HOURS PRN
Status: DISCONTINUED | OUTPATIENT
Start: 2022-10-13 | End: 2022-10-15 | Stop reason: HOSPADM

## 2022-10-13 RX ORDER — DIPHENHYDRAMINE HYDROCHLORIDE 50 MG/ML
12.5 INJECTION INTRAMUSCULAR; INTRAVENOUS
Status: DISCONTINUED | OUTPATIENT
Start: 2022-10-13 | End: 2022-10-13 | Stop reason: HOSPADM

## 2022-10-13 RX ORDER — FUROSEMIDE 10 MG/ML
INJECTION INTRAMUSCULAR; INTRAVENOUS PRN
Status: DISCONTINUED | OUTPATIENT
Start: 2022-10-13 | End: 2022-10-13 | Stop reason: SURG

## 2022-10-13 RX ORDER — ONDANSETRON 2 MG/ML
INJECTION INTRAMUSCULAR; INTRAVENOUS PRN
Status: DISCONTINUED | OUTPATIENT
Start: 2022-10-13 | End: 2022-10-13 | Stop reason: SURG

## 2022-10-13 RX ORDER — FUROSEMIDE 10 MG/ML
20 INJECTION INTRAMUSCULAR; INTRAVENOUS ONCE
Status: COMPLETED | OUTPATIENT
Start: 2022-10-13 | End: 2022-10-13

## 2022-10-13 RX ORDER — HYDROMORPHONE HYDROCHLORIDE 1 MG/ML
0.5 INJECTION, SOLUTION INTRAMUSCULAR; INTRAVENOUS; SUBCUTANEOUS
Status: DISCONTINUED | OUTPATIENT
Start: 2022-10-13 | End: 2022-10-13 | Stop reason: HOSPADM

## 2022-10-13 RX ORDER — SODIUM CHLORIDE, SODIUM LACTATE, POTASSIUM CHLORIDE, CALCIUM CHLORIDE 600; 310; 30; 20 MG/100ML; MG/100ML; MG/100ML; MG/100ML
INJECTION, SOLUTION INTRAVENOUS CONTINUOUS
Status: DISCONTINUED | OUTPATIENT
Start: 2022-10-13 | End: 2022-10-13 | Stop reason: HOSPADM

## 2022-10-13 RX ORDER — LIDOCAINE HYDROCHLORIDE 20 MG/ML
INJECTION, SOLUTION EPIDURAL; INFILTRATION; INTRACAUDAL; PERINEURAL PRN
Status: DISCONTINUED | OUTPATIENT
Start: 2022-10-13 | End: 2022-10-13 | Stop reason: SURG

## 2022-10-13 RX ORDER — IPRATROPIUM BROMIDE AND ALBUTEROL SULFATE 2.5; .5 MG/3ML; MG/3ML
3 SOLUTION RESPIRATORY (INHALATION)
Status: DISCONTINUED | OUTPATIENT
Start: 2022-10-13 | End: 2022-10-13 | Stop reason: HOSPADM

## 2022-10-13 RX ORDER — OXYCODONE HCL 5 MG/5 ML
5 SOLUTION, ORAL ORAL
Status: DISCONTINUED | OUTPATIENT
Start: 2022-10-13 | End: 2022-10-13 | Stop reason: HOSPADM

## 2022-10-13 RX ORDER — OXYCODONE HCL 5 MG/5 ML
10 SOLUTION, ORAL ORAL
Status: DISCONTINUED | OUTPATIENT
Start: 2022-10-13 | End: 2022-10-13 | Stop reason: HOSPADM

## 2022-10-13 RX ORDER — SODIUM CHLORIDE, SODIUM LACTATE, POTASSIUM CHLORIDE, CALCIUM CHLORIDE 600; 310; 30; 20 MG/100ML; MG/100ML; MG/100ML; MG/100ML
INJECTION, SOLUTION INTRAVENOUS CONTINUOUS
Status: DISCONTINUED | OUTPATIENT
Start: 2022-10-13 | End: 2022-10-15 | Stop reason: HOSPADM

## 2022-10-13 RX ORDER — MEPERIDINE HYDROCHLORIDE 25 MG/ML
25 INJECTION INTRAMUSCULAR; INTRAVENOUS; SUBCUTANEOUS
Status: DISCONTINUED | OUTPATIENT
Start: 2022-10-13 | End: 2022-10-13 | Stop reason: HOSPADM

## 2022-10-13 RX ORDER — SODIUM CHLORIDE, SODIUM LACTATE, POTASSIUM CHLORIDE, CALCIUM CHLORIDE 600; 310; 30; 20 MG/100ML; MG/100ML; MG/100ML; MG/100ML
INJECTION, SOLUTION INTRAVENOUS CONTINUOUS
Status: DISCONTINUED | OUTPATIENT
Start: 2022-10-13 | End: 2022-10-13

## 2022-10-13 RX ORDER — KETOROLAC TROMETHAMINE 30 MG/ML
INJECTION, SOLUTION INTRAMUSCULAR; INTRAVENOUS PRN
Status: DISCONTINUED | OUTPATIENT
Start: 2022-10-13 | End: 2022-10-13 | Stop reason: SURG

## 2022-10-13 RX ORDER — OXYCODONE HYDROCHLORIDE 5 MG/1
5 TABLET ORAL
Status: DISCONTINUED | OUTPATIENT
Start: 2022-10-13 | End: 2022-10-15 | Stop reason: HOSPADM

## 2022-10-13 RX ORDER — PROCHLORPERAZINE EDISYLATE 5 MG/ML
10 INJECTION INTRAMUSCULAR; INTRAVENOUS EVERY 6 HOURS PRN
Status: DISCONTINUED | OUTPATIENT
Start: 2022-10-13 | End: 2022-10-15 | Stop reason: HOSPADM

## 2022-10-13 RX ORDER — HYDROMORPHONE HYDROCHLORIDE 1 MG/ML
0.2 INJECTION, SOLUTION INTRAMUSCULAR; INTRAVENOUS; SUBCUTANEOUS
Status: DISCONTINUED | OUTPATIENT
Start: 2022-10-13 | End: 2022-10-13 | Stop reason: HOSPADM

## 2022-10-13 RX ORDER — DEXAMETHASONE SODIUM PHOSPHATE 4 MG/ML
INJECTION, SOLUTION INTRA-ARTICULAR; INTRALESIONAL; INTRAMUSCULAR; INTRAVENOUS; SOFT TISSUE PRN
Status: DISCONTINUED | OUTPATIENT
Start: 2022-10-13 | End: 2022-10-13 | Stop reason: SURG

## 2022-10-13 RX ORDER — CEFOTETAN DISODIUM 2 G/20ML
INJECTION, POWDER, FOR SOLUTION INTRAMUSCULAR; INTRAVENOUS PRN
Status: DISCONTINUED | OUTPATIENT
Start: 2022-10-13 | End: 2022-10-13 | Stop reason: SURG

## 2022-10-13 RX ORDER — ONDANSETRON 2 MG/ML
4 INJECTION INTRAMUSCULAR; INTRAVENOUS
Status: COMPLETED | OUTPATIENT
Start: 2022-10-13 | End: 2022-10-13

## 2022-10-13 RX ORDER — ACETAMINOPHEN 325 MG/1
650 TABLET ORAL EVERY 6 HOURS PRN
Status: DISCONTINUED | OUTPATIENT
Start: 2022-10-13 | End: 2022-10-15 | Stop reason: HOSPADM

## 2022-10-13 RX ORDER — OXYCODONE HYDROCHLORIDE 5 MG/1
2.5 TABLET ORAL
Status: DISCONTINUED | OUTPATIENT
Start: 2022-10-13 | End: 2022-10-15 | Stop reason: HOSPADM

## 2022-10-13 RX ORDER — BUPIVACAINE HYDROCHLORIDE AND EPINEPHRINE 2.5; 5 MG/ML; UG/ML
INJECTION, SOLUTION EPIDURAL; INFILTRATION; INTRACAUDAL; PERINEURAL
Status: DISCONTINUED | OUTPATIENT
Start: 2022-10-13 | End: 2022-10-13 | Stop reason: HOSPADM

## 2022-10-13 RX ORDER — MIDAZOLAM HYDROCHLORIDE 1 MG/ML
1 INJECTION INTRAMUSCULAR; INTRAVENOUS
Status: DISCONTINUED | OUTPATIENT
Start: 2022-10-13 | End: 2022-10-13 | Stop reason: HOSPADM

## 2022-10-13 RX ADMIN — HYDROMORPHONE HYDROCHLORIDE 0.2 MG: 1 INJECTION, SOLUTION INTRAMUSCULAR; INTRAVENOUS; SUBCUTANEOUS at 16:25

## 2022-10-13 RX ADMIN — FENTANYL CITRATE 50 MCG: 50 INJECTION, SOLUTION INTRAMUSCULAR; INTRAVENOUS at 09:10

## 2022-10-13 RX ADMIN — PROCHLORPERAZINE EDISYLATE 10 MG: 5 INJECTION INTRAMUSCULAR; INTRAVENOUS at 17:12

## 2022-10-13 RX ADMIN — SODIUM CHLORIDE, POTASSIUM CHLORIDE, SODIUM LACTATE AND CALCIUM CHLORIDE: 600; 310; 30; 20 INJECTION, SOLUTION INTRAVENOUS at 07:35

## 2022-10-13 RX ADMIN — ROCURONIUM BROMIDE 20 MG: 10 INJECTION, SOLUTION INTRAVENOUS at 11:29

## 2022-10-13 RX ADMIN — MIDAZOLAM 2 MG: 1 INJECTION INTRAMUSCULAR; INTRAVENOUS at 07:35

## 2022-10-13 RX ADMIN — CEFOTETAN DISODIUM 2 G: 2 INJECTION, POWDER, FOR SOLUTION INTRAMUSCULAR; INTRAVENOUS at 14:06

## 2022-10-13 RX ADMIN — Medication 200 MCG: at 09:51

## 2022-10-13 RX ADMIN — ROCURONIUM BROMIDE 20 MG: 10 INJECTION, SOLUTION INTRAVENOUS at 09:16

## 2022-10-13 RX ADMIN — DIPHENHYDRAMINE HYDROCHLORIDE 12.5 MG: 50 INJECTION, SOLUTION INTRAMUSCULAR; INTRAVENOUS at 15:43

## 2022-10-13 RX ADMIN — Medication 200 MCG: at 12:36

## 2022-10-13 RX ADMIN — HYDROMORPHONE HYDROCHLORIDE 0.2 MG: 1 INJECTION, SOLUTION INTRAMUSCULAR; INTRAVENOUS; SUBCUTANEOUS at 16:42

## 2022-10-13 RX ADMIN — Medication 100 MCG: at 13:54

## 2022-10-13 RX ADMIN — FENTANYL CITRATE 50 MCG: 50 INJECTION, SOLUTION INTRAMUSCULAR; INTRAVENOUS at 08:21

## 2022-10-13 RX ADMIN — FUROSEMIDE 20 MG: 10 INJECTION, SOLUTION INTRAMUSCULAR; INTRAVENOUS at 11:57

## 2022-10-13 RX ADMIN — FENTANYL CITRATE 50 MCG: 50 INJECTION, SOLUTION INTRAMUSCULAR; INTRAVENOUS at 15:01

## 2022-10-13 RX ADMIN — FENTANYL CITRATE 50 MCG: 50 INJECTION, SOLUTION INTRAMUSCULAR; INTRAVENOUS at 14:35

## 2022-10-13 RX ADMIN — ROCURONIUM BROMIDE 50 MG: 10 INJECTION, SOLUTION INTRAVENOUS at 07:40

## 2022-10-13 RX ADMIN — SODIUM CHLORIDE, POTASSIUM CHLORIDE, SODIUM LACTATE AND CALCIUM CHLORIDE: 600; 310; 30; 20 INJECTION, SOLUTION INTRAVENOUS at 18:29

## 2022-10-13 RX ADMIN — Medication 100 MCG: at 11:41

## 2022-10-13 RX ADMIN — FENTANYL CITRATE 50 MCG: 50 INJECTION, SOLUTION INTRAMUSCULAR; INTRAVENOUS at 15:48

## 2022-10-13 RX ADMIN — FLUORESCEIN SODIUM 5 ML: 100 INJECTION, SOLUTION OPHTHALMIC at 11:30

## 2022-10-13 RX ADMIN — DEXAMETHASONE SODIUM PHOSPHATE 8 MG: 4 INJECTION, SOLUTION INTRA-ARTICULAR; INTRALESIONAL; INTRAMUSCULAR; INTRAVENOUS; SOFT TISSUE at 08:06

## 2022-10-13 RX ADMIN — MIDAZOLAM HYDROCHLORIDE 1 MG: 2 INJECTION, SOLUTION INTRAMUSCULAR; INTRAVENOUS at 15:04

## 2022-10-13 RX ADMIN — Medication 100 MCG: at 13:08

## 2022-10-13 RX ADMIN — KETOROLAC TROMETHAMINE 30 MG: 30 INJECTION, SOLUTION INTRAMUSCULAR at 14:46

## 2022-10-13 RX ADMIN — SUGAMMADEX 130 MG: 100 INJECTION, SOLUTION INTRAVENOUS at 14:50

## 2022-10-13 RX ADMIN — FENTANYL CITRATE 50 MCG: 50 INJECTION, SOLUTION INTRAMUSCULAR; INTRAVENOUS at 10:03

## 2022-10-13 RX ADMIN — FENTANYL CITRATE 100 MCG: 50 INJECTION, SOLUTION INTRAMUSCULAR; INTRAVENOUS at 07:40

## 2022-10-13 RX ADMIN — ROCURONIUM BROMIDE 10 MG: 10 INJECTION, SOLUTION INTRAVENOUS at 10:15

## 2022-10-13 RX ADMIN — METHYLENE BLUE 10 MG: 10 INJECTION INTRAVENOUS at 10:02

## 2022-10-13 RX ADMIN — FUROSEMIDE 20 MG: 10 INJECTION, SOLUTION INTRAMUSCULAR; INTRAVENOUS at 17:11

## 2022-10-13 RX ADMIN — ONDANSETRON 4 MG: 2 INJECTION INTRAMUSCULAR; INTRAVENOUS at 08:06

## 2022-10-13 RX ADMIN — MORPHINE SULFATE 2 MG: 4 INJECTION, SOLUTION INTRAMUSCULAR; INTRAVENOUS at 18:43

## 2022-10-13 RX ADMIN — OXYCODONE 5 MG: 5 TABLET ORAL at 20:40

## 2022-10-13 RX ADMIN — LIDOCAINE HYDROCHLORIDE 40 MG: 20 INJECTION, SOLUTION EPIDURAL; INFILTRATION; INTRACAUDAL at 07:40

## 2022-10-13 RX ADMIN — SODIUM CHLORIDE, POTASSIUM CHLORIDE, SODIUM LACTATE AND CALCIUM CHLORIDE: 600; 310; 30; 20 INJECTION, SOLUTION INTRAVENOUS at 06:52

## 2022-10-13 RX ADMIN — ONDANSETRON 4 MG: 2 INJECTION INTRAMUSCULAR; INTRAVENOUS at 15:34

## 2022-10-13 RX ADMIN — PROPOFOL 150 MG: 10 INJECTION, EMULSION INTRAVENOUS at 07:40

## 2022-10-13 RX ADMIN — ROCURONIUM BROMIDE 20 MG: 10 INJECTION, SOLUTION INTRAVENOUS at 13:34

## 2022-10-13 RX ADMIN — CEFOTETAN DISODIUM 2 G: 2 INJECTION, POWDER, FOR SOLUTION INTRAMUSCULAR; INTRAVENOUS at 07:35

## 2022-10-13 RX ADMIN — Medication 100 MCG: at 09:05

## 2022-10-13 ASSESSMENT — LIFESTYLE VARIABLES
CONSUMPTION TOTAL: NEGATIVE
AVERAGE NUMBER OF DAYS PER WEEK YOU HAVE A DRINK CONTAINING ALCOHOL: 1
TOTAL SCORE: 0
ALCOHOL_USE: YES
HAVE YOU EVER FELT YOU SHOULD CUT DOWN ON YOUR DRINKING: NO
HAVE PEOPLE ANNOYED YOU BY CRITICIZING YOUR DRINKING: NO
EVER FELT BAD OR GUILTY ABOUT YOUR DRINKING: NO
ON A TYPICAL DAY WHEN YOU DRINK ALCOHOL HOW MANY DRINKS DO YOU HAVE: 1
EVER HAD A DRINK FIRST THING IN THE MORNING TO STEADY YOUR NERVES TO GET RID OF A HANGOVER: NO
HOW MANY TIMES IN THE PAST YEAR HAVE YOU HAD 5 OR MORE DRINKS IN A DAY: 0
TOTAL SCORE: 0
TOTAL SCORE: 0
DOES PATIENT WANT TO STOP DRINKING: NO

## 2022-10-13 ASSESSMENT — COGNITIVE AND FUNCTIONAL STATUS - GENERAL
CLIMB 3 TO 5 STEPS WITH RAILING: A LITTLE
WALKING IN HOSPITAL ROOM: A LITTLE
SUGGESTED CMS G CODE MODIFIER DAILY ACTIVITY: CH
SUGGESTED CMS G CODE MODIFIER MOBILITY: CJ
MOBILITY SCORE: 21
DAILY ACTIVITIY SCORE: 24
STANDING UP FROM CHAIR USING ARMS: A LITTLE

## 2022-10-13 ASSESSMENT — PAIN DESCRIPTION - PAIN TYPE
TYPE: SURGICAL PAIN

## 2022-10-13 ASSESSMENT — PATIENT HEALTH QUESTIONNAIRE - PHQ9
2. FEELING DOWN, DEPRESSED, IRRITABLE, OR HOPELESS: NOT AT ALL
SUM OF ALL RESPONSES TO PHQ9 QUESTIONS 1 AND 2: 0
1. LITTLE INTEREST OR PLEASURE IN DOING THINGS: NOT AT ALL

## 2022-10-13 ASSESSMENT — FIBROSIS 4 INDEX: FIB4 SCORE: 0.64

## 2022-10-13 ASSESSMENT — PAIN SCALES - GENERAL: PAIN_LEVEL: 6

## 2022-10-13 NOTE — OR NURSING
Assume care for pt in pre-op. Patient allergies and NPO status verified. Belongings secured. Patient verbalizes understanding of pain scale, expected course of stay and plan of care. Surgical site verified with patient. IV access established. FBs = 85 per SSI protocol. Pt had no Covid symptoms or exposure in the past 21 days, no covid test needed at this time. Pt's BP elevated this am, Dr. Elliott notified. Call light within reach. No further needs at this time. Hourly rounding in place.

## 2022-10-13 NOTE — OR NURSING
1455: Pt arrives to PACU asleep and calm. VSS. 5 lap sites to abdomen some with scant drainage noted. Ice pack applied. Gray catheter in place.     1350: Dr. Elliott notified of pts DBP sustaining in the 100's. No new orders received just to keep monitoring at this time.     1352: Pts mom called and updated.    1645: Tatianna NP at bedside. Notified that pt continues to have nausea and does not feel ready to go home. IV dose of lasix and compazine ordered at this time. Pts mom called and updated on pt status.     1755: Scant drainage noted to dressing, Pt states pain is tolerable and nausea persists but is better. Report called to Guillermo CALDERA.

## 2022-10-13 NOTE — DISCHARGE INSTRUCTIONS
1. No heavy lifting greater than 10 pounds for minimum 6 weeks  2. Nothing in vagina (ie no tampons, douching, intercourse) for minimum of 6 weeks  3. No driving while taking narcotics   4. Return to our office as directed and call to confirm appointment  Call our office [phone] if you develop any fevers, chills, nausea/vomiting, heavy vaginal bleeding, or redness, tenderness, and/or drainage from your wound, if you have persistent watery discharge while ambulating or stool draining from the vagina .  5. Showering is ok after shower make sure wound is dry.   6. You may keep the wound dressing and change everyday. After 2 weeks from surgery you may keep the wound dressing off.   7. You may have vaginal spotting or light bleeding which is normal.  8. If you have not had a bowel movement for 2 days, please take over the counter Milk of Magnesium, 1 tablespoon every 4 hours. After 4 doses and if you still have not had a bowel movement, please call your doctor.   9. You may eat soft diet, such as soup, liquid, for day #1 and if tolerating you may resume your regular diet.    10. YOU WILL KEEP YOUR RIVERO CATHETER for 3 weeks and then complete a cystogram prior to removal. We will call you and schedule this exam.     11. YOU HAVE A RIGHT ureteral stent placed, please make sure that you have an appointment for a stent removal in six weeks in the office after surgery. Please drink lots of fluids. You may have some blood in the urine and discomfort from your stents. Please call [phone] if you have any questions or concerns.

## 2022-10-13 NOTE — ANESTHESIA POSTPROCEDURE EVALUATION
Patient: Lissette Lee    Procedure Summary     Date: 10/13/22 Room / Location: [place] [address] / SURGERY [facility]    Anesthesia Start: 0735 Anesthesia Stop: 1500    Procedures:       ROBOTIC HYSTERECTOMY,BILATERAL SALPINGO OOPHORECTOMY RIGHT URETERAL STENT, LEFT URETERAL LIGATION (Abdomen)      SALPINGO-OOPHORECTOMY (Bilateral: Abdomen)      CYSTOSCOPY, WITH URETERAL STENT INSERTION (Bilateral: Bladder)      APPENDECTOMY, ROBOT-ASSISTED, USING DA JUANCHO XI (Abdomen) Diagnosis: (BENIGN OVARIAN ENDOMETRIOMA)    Surgeons: Eliseo Briscoe M.D. Responsible Provider: Eliseo Elliott M.D.    Anesthesia Type: general ASA Status: 2          Final Anesthesia Type: general  Last vitals  BP   Blood Pressure: (!) 151/95    Temp   36.1 °C (97 °F)    Pulse   73   Resp   (!) 10    SpO2   100 %      Anesthesia Post Evaluation    Patient location during evaluation: PACU  Patient participation: complete - patient participated  Level of consciousness: awake and alert  Pain score: 6    Airway patency: patent  Anesthetic complications: no  Cardiovascular status: hemodynamically stable  Respiratory status: acceptable  Hydration status: euvolemic    PONV: none          No notable events documented.     Nurse Pain Score: 7 (NPRS)

## 2022-10-13 NOTE — ANESTHESIA TIME REPORT
Anesthesia Start and Stop Event Times     Date Time Event    10/13/2022 0729 Ready for Procedure     0735 Anesthesia Start     1500 Anesthesia Stop        Responsible Staff  10/13/22    Name Role Begin End    Eliseo Elliott M.D. Anesth 0735 1500        Overtime Reason:  no overtime (within assigned shift)    Comments:

## 2022-10-13 NOTE — ANESTHESIA PREPROCEDURE EVALUATION
Case: [id number] Date/Time: 10/13/22 0715    Procedures:       ROBOTIC HYSTERECTOMY,BILATERAL SALPINGO OOPHORECTOMY LYSIS OF ADHESION POSSIBLE COLON RESECTION POSSBLE RIGHT AND OR LEFT UTEROLYSIS SURGICAL STAGING, POSSIBLE RIGHT OR LEFT OR BILATERAL PELVIC AND FAB AORTIC NODE DISSECTION POSSIBLE OMENTECTOMY PERITONEAL BIOPSIES POSSIBLE URETERAL STENT      SALPINGO-OOPHORECTOMY      COLECTOMY, ROBOT-ASSISTED      LYMPHADENECTOMY, ROBOT-ASSISTED      OMENTECTOMY, ROBOT-ASSISTED      CYSTOSCOPY, WITH URETERAL STENT INSERTION    Pre-op diagnosis: PELVIC MASS PELVIC PAIN    Location: [facility] / SURGERY [facility]    Surgeons: Eliseo Briscoe M.D.          Relevant Problems   No relevant active problems       Physical Exam    Airway   Mallampati: II  TM distance: >3 FB  Neck ROM: full       Cardiovascular - normal exam  Rhythm: regular  Rate: normal  (-) murmur     Dental - normal exam           Pulmonary - normal exam  Breath sounds clear to auscultation     Abdominal    Neurological - normal exam                 Anesthesia Plan    ASA 2       Plan - general       Airway plan will be ETT          Induction: intravenous    Postoperative Plan: Postoperative administration of opioids is intended.    Pertinent diagnostic labs and testing reviewed    Informed Consent:    Anesthetic plan and risks discussed with patient.    Use of blood products discussed with: patient whom consented to blood products.

## 2022-10-13 NOTE — ANESTHESIA PROCEDURE NOTES
Airway    Date/Time: 10/13/2022 7:41 AM  Performed by: Eliseo Elliott M.D.  Authorized by: Eliseo Elliott M.D.     Location:  OR  Urgency:  Elective  Indications for Airway Management:  Anesthesia      Spontaneous Ventilation: absent    Sedation Level:  Deep  Preoxygenated: Yes    Patient Position:  Sniffing  Final Airway Type:  Endotracheal airway  Final Endotracheal Airway:  ETT  Cuffed: Yes    Technique Used for Successful ETT Placement:  Direct laryngoscopy    Insertion Site:  Oral  Blade Type:  Autumn  Laryngoscope Blade/Videolaryngoscope Blade Size:  3  ETT Size (mm):  7.0  Measured from:  Teeth  ETT to Teeth (cm):  21  Placement Verified by: auscultation and capnometry    Cormack-Lehane Classification:  Grade I - full view of glottis  Number of Attempts at Approach:  1

## 2022-10-14 ENCOUNTER — APPOINTMENT (OUTPATIENT)
Dept: RADIOLOGY | Facility: MEDICAL CENTER | Age: 37
End: 2022-10-14
Attending: STUDENT IN AN ORGANIZED HEALTH CARE EDUCATION/TRAINING PROGRAM
Payer: MEDICAID

## 2022-10-14 LAB
BASOPHILS # BLD AUTO: 0.2 % (ref 0–1.8)
BASOPHILS # BLD: 0.01 K/UL (ref 0–0.12)
EOSINOPHIL # BLD AUTO: 0.04 K/UL (ref 0–0.51)
EOSINOPHIL NFR BLD: 0.7 % (ref 0–6.9)
ERYTHROCYTE [DISTWIDTH] IN BLOOD BY AUTOMATED COUNT: 42.5 FL (ref 35.9–50)
ERYTHROCYTE [DISTWIDTH] IN BLOOD BY AUTOMATED COUNT: 42.9 FL (ref 35.9–50)
HCT VFR BLD AUTO: 20.9 % (ref 37–47)
HCT VFR BLD AUTO: 21.8 % (ref 37–47)
HGB BLD-MCNC: 6.3 G/DL (ref 12–16)
HGB BLD-MCNC: 6.5 G/DL (ref 12–16)
IMM GRANULOCYTES # BLD AUTO: 0.02 K/UL (ref 0–0.11)
IMM GRANULOCYTES NFR BLD AUTO: 0.3 % (ref 0–0.9)
LYMPHOCYTES # BLD AUTO: 0.87 K/UL (ref 1–4.8)
LYMPHOCYTES NFR BLD: 14.2 % (ref 22–41)
MCH RBC QN AUTO: 20.6 PG (ref 27–33)
MCH RBC QN AUTO: 20.7 PG (ref 27–33)
MCHC RBC AUTO-ENTMCNC: 29.8 G/DL (ref 33.6–35)
MCHC RBC AUTO-ENTMCNC: 30.1 G/DL (ref 33.6–35)
MCV RBC AUTO: 68.8 FL (ref 81.4–97.8)
MCV RBC AUTO: 69 FL (ref 81.4–97.8)
MONOCYTES # BLD AUTO: 0.49 K/UL (ref 0–0.85)
MONOCYTES NFR BLD AUTO: 8 % (ref 0–13.4)
NEUTROPHILS # BLD AUTO: 4.71 K/UL (ref 2–7.15)
NEUTROPHILS NFR BLD: 76.6 % (ref 44–72)
NRBC # BLD AUTO: 0 K/UL
NRBC BLD-RTO: 0 /100 WBC
PLATELET # BLD AUTO: 195 K/UL (ref 164–446)
PLATELET # BLD AUTO: 225 K/UL (ref 164–446)
PMV BLD AUTO: 10.5 FL (ref 9–12.9)
PMV BLD AUTO: 11.4 FL (ref 9–12.9)
RBC # BLD AUTO: 3.04 M/UL (ref 4.2–5.4)
RBC # BLD AUTO: 3.16 M/UL (ref 4.2–5.4)
WBC # BLD AUTO: 6.1 K/UL (ref 4.8–10.8)
WBC # BLD AUTO: 8.3 K/UL (ref 4.8–10.8)

## 2022-10-14 PROCEDURE — A9270 NON-COVERED ITEM OR SERVICE: HCPCS | Performed by: STUDENT IN AN ORGANIZED HEALTH CARE EDUCATION/TRAINING PROGRAM

## 2022-10-14 PROCEDURE — 85027 COMPLETE CBC AUTOMATED: CPT | Mod: XU

## 2022-10-14 PROCEDURE — G0378 HOSPITAL OBSERVATION PER HR: HCPCS

## 2022-10-14 PROCEDURE — 700102 HCHG RX REV CODE 250 W/ 637 OVERRIDE(OP): Performed by: STUDENT IN AN ORGANIZED HEALTH CARE EDUCATION/TRAINING PROGRAM

## 2022-10-14 PROCEDURE — 700105 HCHG RX REV CODE 258: Performed by: STUDENT IN AN ORGANIZED HEALTH CARE EDUCATION/TRAINING PROGRAM

## 2022-10-14 PROCEDURE — 85025 COMPLETE CBC W/AUTO DIFF WBC: CPT

## 2022-10-14 PROCEDURE — 36415 COLL VENOUS BLD VENIPUNCTURE: CPT

## 2022-10-14 RX ADMIN — OXYCODONE 5 MG: 5 TABLET ORAL at 09:50

## 2022-10-14 RX ADMIN — SODIUM CHLORIDE, POTASSIUM CHLORIDE, SODIUM LACTATE AND CALCIUM CHLORIDE: 600; 310; 30; 20 INJECTION, SOLUTION INTRAVENOUS at 06:22

## 2022-10-14 RX ADMIN — OXYCODONE 5 MG: 5 TABLET ORAL at 02:38

## 2022-10-14 RX ADMIN — OXYCODONE 5 MG: 5 TABLET ORAL at 06:26

## 2022-10-14 RX ADMIN — ACETAMINOPHEN 650 MG: 325 TABLET, FILM COATED ORAL at 09:49

## 2022-10-14 RX ADMIN — OXYCODONE 5 MG: 5 TABLET ORAL at 13:04

## 2022-10-14 RX ADMIN — OXYCODONE 5 MG: 5 TABLET ORAL at 17:34

## 2022-10-14 RX ADMIN — OXYCODONE 5 MG: 5 TABLET ORAL at 23:45

## 2022-10-14 ASSESSMENT — ENCOUNTER SYMPTOMS
VOMITING: 0
CHILLS: 0
NAUSEA: 0
SHORTNESS OF BREATH: 0
COUGH: 0
ABDOMINAL PAIN: 1
BACK PAIN: 0
DIZZINESS: 0
FEVER: 0
DIARRHEA: 0

## 2022-10-14 ASSESSMENT — PAIN DESCRIPTION - PAIN TYPE
TYPE: ACUTE PAIN
TYPE: SURGICAL PAIN
TYPE: ACUTE PAIN
TYPE: SURGICAL PAIN
TYPE: ACUTE PAIN;SURGICAL PAIN
TYPE: SURGICAL PAIN
TYPE: SURGICAL PAIN
TYPE: ACUTE PAIN;SURGICAL PAIN

## 2022-10-14 NOTE — OR SURGEON
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    Anesthesiologist/Type of Anesthesia:  Anesthesiologist: Eliseo Elliott M.D./General    Surgical Staff:  Assistant: Tatianna Leon P.A.-C.  Circulator: Laura Bernard R.N.  Relief Circulator: Ameena Morin R.N.  Scrub Person: Ophelia Stephen; Kaylin Marvin  Count [place]: Katarina Childs    Specimens removed if any:  ID Type Source Tests Collected by Time Destination   A : pelvic fluid Other Other PATHOLOGY SPECIMEN Eliseo Briscoe M.D. 10/13/2022  8:15 AM    B : intracystic fluid Other Other PATHOLOGY SPECIMEN Eliseo Briscoe M.D. 10/13/2022  8:25 AM    C : appendix Tissue Appendix PATHOLOGY SPECIMEN Eliseo Briscoe M.D. 10/13/2022 11:24 AM    D : uterus, cervix, bilateral fallopian tubes and ovaries Tissue Uterus PATHOLOGY SPECIMEN Eliseo Briscoe M.D. 10/13/2022 11:36 AM        Estimated Blood Loss: 400 cc    Findings: Approximately 100 cc of serous fluid was noted in both right and left costophrenic gutter diaphragm was smooth liver capsule was smooth stomach was grossly normal the spleen was unremarkable omentum appeared grossly normal abdominal peritoneal surfaces were remarkable omentum appeared grossly normal.    In the pelvis uterus was approximately 8 weeks in size.  There was an enlarged left ovary  with a smooth cystic wall about the size of the honeydew while the right ovary was about the size of a cantaloupe is a classic kissing ovaries.  There is a complete cul-de-sac obliteration with the mass compressing on the sigmoid colon the sigmoid colon was densely attached to the pouch of Nathaniel with complete obliteration.  The ovaries had extended out to the parametria to the pararectal space compressing on both the ureters.  The endometriosis was extending out to the ureter bilaterally necessitating an extensive ureterolysis.  The appendix was involved with endometriosis.  There are endometriosis brown pigmentation noted on both the right and left pelvic ureter.    Should be noted that the surgery was exceedingly difficult secondary to the complete cul-de-sac obliteration and the extensive endometriosis that was involved.  Should also be noted that we had to perform an extensive ureterolysis and after extensive ureterolysis that I performed in order to protect the ureters I elected to place bilateral ureteral stents.  The right ureteral stent was placed without difficulty however the left ureteral stent we were unable to place it.    Cystoscopic evaluation of the bladder revealed a attenuated left ureteral orifice which was barely noted.  We had injected methylene blue followed by indigocarmine there was never E flux of the dye through the left ureteral orifice after nearly 45 minutes of evaluation.  We had attempted to intubate the left ureteral orifice I was able to intubate about a centimeter in however I was unable to pass a Glidewire through the open-ended catheter.  This was done at the concurrently with the cystoscope along with the robotic endoscope evaluating intraperitoneally.  Given the fact that I was unable to intubate the left ureteral orifice we had attempted to pass a guidewire through an antegrade fashion through the ureter ureterotomy at the level of the ureteric tunnel I was unable to pass the  Glidewire from the pelvic ureter into the bladder.  What I noted it was at the time of the urethrotomy the left ureteral lumen was exceedingly attenuated I could barely get my robotic tanvi forceps into the lumen.  I would estimate that the lumen was about 4 mm in diameter which was very different from the right ureter.  I was able to manage to pass  Glidewire through the urethrotomy via retrograde fashion at the level of the ureteric tunnel but I was only able to pass it up to the level of the pelvic brim where a Coiling.  And then tried to pass the Glidewire appropriate if the left ureter at the level of the pelvic brim which created another urethrotomy site.  With a separate Glidewire I try to intubate the proximal left abdominal ureter and I was met with resistance.  Should also be noted that at the time of the urethrotomy there was no fluorescein flow of urine coming out what I did note was brownish fluid coming out of the ureter consistent with an endometriotic cyst.  I was exceedingly puzzled by this inability to intubate the ureter Dr. Santos urologist came in and evaluated CT scan and felt that patient may not have left kidney or atrophic kidney thus at this point in time I elected not to proceed with reconstruction of the left ureter and ligated the ureter at the level of the pelvic brim due to the fact that patient most likely has an atrophic left kidney.  Is unclear to me why the patient had left atrophic left kidney we will evaluate this after her surgery.    Complications: none        10/13/2022 7:30 PM Eliseo Briscoe M.D.

## 2022-10-14 NOTE — CARE PLAN
The patient is Watcher - Medium risk of patient condition declining or worsening    Shift Goals  Clinical Goals: monitor cortez output; monitor lap sites; pain control  Patient Goals: pain and nausea control; rest  Family Goals: \not present    Progress made toward(s) clinical / shift goals: cortez output and lap sites have been monitored; pain has been controlled with PRNs; pt has had no complaints of nausea through the night    Patient is not progressing towards the following goals: N/A    Problem: Pain - Standard  Goal: Alleviation of pain or a reduction in pain to the patient’s comfort goal  Outcome: Progressing  Note: Pain has been frequently assessed; pt is able to verbalize needs and makes them known. Pain has been controlled with PRN medications.       Problem: Knowledge Deficit - Standard  Goal: Patient and family/care givers will demonstrate understanding of plan of care, disease process/condition, diagnostic tests and medications  Outcome: Progressing  Note: Pt is alert and oriented x 4; is aware and understands plan of care; all questions have been answered at this time.       Problem: Fall Risk  Goal: Patient will remain free from falls  Outcome: Progressing  Note: Bed is locked in the lowest position; pt is wearing treaded non-slip socks and pt calls for assistance appropriately.

## 2022-10-14 NOTE — PROGRESS NOTES
4 Eyes Skin Assessment Completed by Billy CARDOSO RN and Betty PRATT RN.    Head WDL  Ears Redness and Blanching  Nose WDL  Mouth WDL  Neck WDL  Breast/Chest WDL  Shoulder Blades WDL  Spine WDL  (R) Arm/Elbow/Hand WDL  (L) Arm/Elbow/Hand WDL  Abdomen Incision  Groin WDL  Scrotum/Coccyx/Buttocks WDL  (R) Leg WDL  (L) Leg WDL  (R) Heel/Foot/Toe WDL  (L) Heel/Foot/Toe WDL          Devices In Places Pulse Ox, Gray, and Nasal Cannula      Interventions In Place Gray Ear Foams, Pillows, and Pressure Redistribution Mattress    Possible Skin Injury No    Pictures Uploaded Into Epic N/A  Wound Consult Placed N/A  RN Wound Prevention Protocol Ordered No       All lawrence prominences were checked and are WDL. Pt has 5 lap sites to her abdomen that are covered with dressings. No possible skin injuries were noted at this time.

## 2022-10-15 VITALS
BODY MASS INDEX: 24.24 KG/M2 | HEIGHT: 65 IN | WEIGHT: 145.5 LBS | RESPIRATION RATE: 16 BRPM | TEMPERATURE: 98.5 F | HEART RATE: 88 BPM | OXYGEN SATURATION: 97 % | DIASTOLIC BLOOD PRESSURE: 86 MMHG | SYSTOLIC BLOOD PRESSURE: 139 MMHG

## 2022-10-15 LAB
BASOPHILS # BLD AUTO: 0.1 % (ref 0–1.8)
BASOPHILS # BLD: 0.01 K/UL (ref 0–0.12)
EOSINOPHIL # BLD AUTO: 0.08 K/UL (ref 0–0.51)
EOSINOPHIL NFR BLD: 1.2 % (ref 0–6.9)
ERYTHROCYTE [DISTWIDTH] IN BLOOD BY AUTOMATED COUNT: 42.5 FL (ref 35.9–50)
HCT VFR BLD AUTO: 19.4 % (ref 37–47)
HGB BLD-MCNC: 5.9 G/DL (ref 12–16)
IMM GRANULOCYTES # BLD AUTO: 0.03 K/UL (ref 0–0.11)
IMM GRANULOCYTES NFR BLD AUTO: 0.4 % (ref 0–0.9)
LYMPHOCYTES # BLD AUTO: 0.91 K/UL (ref 1–4.8)
LYMPHOCYTES NFR BLD: 13.6 % (ref 22–41)
MCH RBC QN AUTO: 20.8 PG (ref 27–33)
MCHC RBC AUTO-ENTMCNC: 30.4 G/DL (ref 33.6–35)
MCV RBC AUTO: 68.3 FL (ref 81.4–97.8)
MONOCYTES # BLD AUTO: 0.56 K/UL (ref 0–0.85)
MONOCYTES NFR BLD AUTO: 8.3 % (ref 0–13.4)
NEUTROPHILS # BLD AUTO: 5.12 K/UL (ref 2–7.15)
NEUTROPHILS NFR BLD: 76.4 % (ref 44–72)
NRBC # BLD AUTO: 0 K/UL
NRBC BLD-RTO: 0 /100 WBC
PLATELET # BLD AUTO: 196 K/UL (ref 164–446)
PMV BLD AUTO: 11.4 FL (ref 9–12.9)
RBC # BLD AUTO: 2.84 M/UL (ref 4.2–5.4)
WBC # BLD AUTO: 6.7 K/UL (ref 4.8–10.8)

## 2022-10-15 PROCEDURE — 85025 COMPLETE CBC W/AUTO DIFF WBC: CPT

## 2022-10-15 PROCEDURE — A9270 NON-COVERED ITEM OR SERVICE: HCPCS | Performed by: STUDENT IN AN ORGANIZED HEALTH CARE EDUCATION/TRAINING PROGRAM

## 2022-10-15 PROCEDURE — 700102 HCHG RX REV CODE 250 W/ 637 OVERRIDE(OP): Performed by: STUDENT IN AN ORGANIZED HEALTH CARE EDUCATION/TRAINING PROGRAM

## 2022-10-15 PROCEDURE — G0378 HOSPITAL OBSERVATION PER HR: HCPCS

## 2022-10-15 PROCEDURE — 36415 COLL VENOUS BLD VENIPUNCTURE: CPT

## 2022-10-15 RX ORDER — FERROUS SULFATE 325(65) MG
TABLET ORAL
Qty: 30 TABLET | Refills: 0 | Status: SHIPPED | OUTPATIENT
Start: 2022-10-15

## 2022-10-15 RX ADMIN — OXYCODONE 2.5 MG: 5 TABLET ORAL at 18:20

## 2022-10-15 RX ADMIN — OXYCODONE 5 MG: 5 TABLET ORAL at 07:43

## 2022-10-15 RX ADMIN — OXYCODONE 2.5 MG: 5 TABLET ORAL at 15:15

## 2022-10-15 RX ADMIN — OXYCODONE 5 MG: 5 TABLET ORAL at 12:08

## 2022-10-15 RX ADMIN — OXYCODONE 5 MG: 5 TABLET ORAL at 03:43

## 2022-10-15 ASSESSMENT — PAIN DESCRIPTION - PAIN TYPE
TYPE: SURGICAL PAIN

## 2022-10-15 NOTE — PROGRESS NOTES
1929: Pts hgb 6.3, patient asymptomatic at this time. Page to MD Tatianna Leon. No orders for RBC's received, per MD monitor overnight and repeat CBC in AM.     0236: Lab called with critical result of hgb 5.9 at 0235. Critical lab result read back to lab.   Dr. Briscoe notified of critical lab result at 0240.  Critical lab result read back by Dr. Briscoe. Per Dr. Briscoe MD will round in AM, MD declines blood transfusion at this time. Patient asymptomatic, hematuria noted. MD aware.

## 2022-10-15 NOTE — PROGRESS NOTES
Gynecology Oncology Progress Note               Author: Tatianna Leon P.A.-C. Date & Time created: 10/14/2022  5:10 PM     Interval History:  Doing well this AM. Nausea improved, no vomiting. Pain well controlled with oral medication. Hematuria in cortez. Denies dizziness or lightheadedness. +BM.    Review of Systems:  Review of Systems   Constitutional:  Positive for malaise/fatigue. Negative for chills and fever.   Respiratory:  Negative for cough and shortness of breath.    Cardiovascular:  Negative for chest pain and leg swelling.   Gastrointestinal:  Positive for abdominal pain. Negative for diarrhea, nausea and vomiting.   Genitourinary:  Positive for hematuria. Negative for dysuria, frequency and urgency.   Musculoskeletal:  Negative for back pain.   Neurological:  Negative for dizziness.     Physical Exam:  Physical Exam  Constitutional:       General: She is not in acute distress.     Appearance: She is not ill-appearing.   HENT:      Head: Normocephalic.   Cardiovascular:      Rate and Rhythm: Normal rate.      Pulses: Normal pulses.   Pulmonary:      Effort: Pulmonary effort is normal.      Breath sounds: Normal breath sounds.   Abdominal:      General: Abdomen is flat. There is no distension.      Palpations: Abdomen is soft.      Comments: Appropriately tender, lsc incisions with dressing c/d/i   Musculoskeletal:         General: Normal range of motion.   Skin:     General: Skin is warm and dry.   Neurological:      Mental Status: She is alert and oriented to person, place, and time.       Labs:          No results for input(s): SODIUM, POTASSIUM, CHLORIDE, CO2, BUN, CREATININE, MAGNESIUM, PHOSPHORUS, CALCIUM in the last 72 hours.  No results for input(s): ALTSGPT, ASTSGOT, ALKPHOSPHAT, TBILIRUBIN, DBILIRUBIN, GAMMAGT, AMYLASE, LIPASE, ALB, PREALBUMIN, GLUCOSE in the last 72 hours.  Recent Labs     10/14/22  0737   RBC 3.16*   HEMOGLOBIN 6.5*   HEMATOCRIT 21.8*   PLATELETCT 225     Recent Labs      10/14/22  0737   WBC 8.3         Hemodynamics:  Temp (24hrs), Av °C (98.6 °F), Min:35.9 °C (96.7 °F), Max:37.6 °C (99.7 °F)  Temperature: 37.1 °C (98.7 °F)  Pulse  Av.3  Min: 52  Max: 100   Blood Pressure: (!) 131/94     Respiratory:    Respiration: 17, Pulse Oximetry: 98 %           Fluids:    Intake/Output Summary (Last 24 hours) at 10/14/2022 1710  Last data filed at 10/14/2022 0600  Gross per 24 hour   Intake --   Output 2500 ml   Net -2500 ml        GI/Nutrition:  Orders Placed This Encounter   Procedures    Diet Order Diet: Regular     Standing Status:   Standing     Number of Occurrences:   1     Order Specific Question:   Diet:     Answer:   Regular [1]     Medical Decision Making, by Problem:  Active Hospital Problems    Diagnosis     *Endometriosis [N80.9]        Plan:  This is a 37 year old females with pelvic mass, no s/p robototic assisted hysterectomy, bilateral salpingo oophorectomy, bilateral ureterolysis, right stent placement, cystectomy with repair, and left ureteral ligation:     POD #1 : routine post operative care, encourage ambulation and IS. Keep cortez for 3 weeks due to cystectomy. Diet as tolerated.   Post op pain: Percocet prn with MS for BTP  Nausea/vomiting: post operative, improving, Zofran and compazine prn  Hematuria: secondary to stent placement, CTM  Anemia: secondary to acute blood loss and possibly hemodilution. Asymptomatic, follow CBC and we will transfuse as needed.   Possible atrophic left kidney: intra op finding of abnormal left ureter with no noted urine output. Possibly atrophic, Dr. Briscoe reviewed CT imaging. Will get Mag3 renogram to assess.   GI/FEN: regular diet, monitor lytes and replace prn  Ppx: SCDs, ambulation   Dispo: Continue inpatient management     Case discussed with Dr. Briscoe

## 2022-10-15 NOTE — NON-PROVIDER
Discharge Summary:     Date of Admission: 10/13/2022  Date of Discharge: 10/15/22      Admitting diagnosis:    1. Endometriosis      Discharge Diagnosis:   1. Endometriosis status post robotic hysterectomy, bilateral salpingo-oophorectomy, bilateral ureterolysis, right stent placement, cystectomy with repair, and left ureteral ligation    Past Medical History:   Diagnosis Date    ASTHMA     Hypertension     Mass of ovary      Past Surgical History:   Procedure Laterality Date    FL CYSTOSCOPY,INSERT URETERAL STENT Bilateral 10/13/2022    Procedure: CYSTOSCOPY, WITH URETERAL STENT INSERTION;  Surgeon: Eliseo Briscoe M.D.;  Location: SURGERY [facility];  Service: Gyn Robotic    FL LAP,APPENDECTOMY  10/13/2022    Procedure: APPENDECTOMY, ROBOT-ASSISTED, USING DA JUANCHO XI;  Surgeon: Eliseo Briscoe M.D.;  Location: SURGERY [facility];  Service: Gyn Robotic    HYSTERECTOMY ROBOTIC XI  10/13/2022    Procedure: ROBOTIC HYSTERECTOMY,BILATERAL SALPINGO OOPHORECTOMY RIGHT URETERAL STENT, LEFT URETERAL LIGATION;  Surgeon: Eliseo Briscoe M.D.;  Location: SURGERY [facility];  Service: Gyn Robotic    SALPINGO OOPHORECTOMY Bilateral 10/13/2022    Procedure: SALPINGO-OOPHORECTOMY;  Surgeon: Eliseo Briscoe M.D.;  Location: SURGERY [facility];  Service: Gyn Robotic     No known drug allergy    Hospital Course:   Pt is a 37 y.o. now  who presented for robotic assisted hysterectomy, bilateral salpingo oophorectomy, bilateral ureterolysis, right stent placement, cystectomy with repair, and left ureteral ligation for pelvic mass and endometriosis. She admitted for observation secondary to post-operative nausea. She is recovering as expected with pain well controlled, tolerating regular diet without nausea/vomiting. She has good urine output from Gray, passing bowel movements, and ambulating as tolerated. Patient was noted to have anemia with Hbg of 6.5, 6.3, and 5.9, however is asymptomatic with normal orthostatics, thus not  requiring blood transfusion. Hematuria has resolved. On re-review of CT imaging, it was noted that she has renal agenesis of left kidney and renogram was deferred.     Physical Exam:  Temp:  [36.9 °C (98.5 °F)-37.7 °C (99.9 °F)] 36.9 °C (98.5 °F)  Pulse:  [] 88  Resp:  [16-17] 16  BP: (123-145)/(72-97) 139/86  SpO2:  [94 %-100 %] 97 %  Physical Exam  General: well and not in acute distress   Abdomen: soft, non-tender, non-distended. Bowel sounds present. No organomegaly. Incisions c/d/I without signs of infection.   Pelvic: Gray in place with yellow urine consistent with fluorescein dye administered intra-op/   Extremities: warm, no bilateral lower extremity edema, calves nontender    Orthostatic:   Lyin/110  Sitting for 1 minute: 162/101  Sitting for 5 minutes: 170/112    Current Facility-Administered Medications   Medication Dose    lactated ringers infusion      acetaminophen (Tylenol) tablet 650 mg  650 mg    oxyCODONE immediate-release (ROXICODONE) tablet 2.5 mg  2.5 mg    Or    oxyCODONE immediate-release (ROXICODONE) tablet 5 mg  5 mg    Or    morphine 4 MG/ML injection 2 mg  2 mg    prochlorperazine (COMPAZINE) injection 10 mg  10 mg    ondansetron (ZOFRAN) syringe/vial injection 4 mg  4 mg       Recent Labs     10/14/22  0737 10/14/22  1725 10/15/22  0136   WBC 8.3 6.1 6.7   RBC 3.16* 3.04* 2.84*   HEMOGLOBIN 6.5* 6.3* 5.9*   HEMATOCRIT 21.8* 20.9* 19.4*   MCV 69.0* 68.8* 68.3*   MCH 20.6* 20.7* 20.8*   MCHC 29.8* 30.1* 30.4*   RDW 42.5 42.9 42.5   PLATELETCT 225 195 196   MPV 11.4 10.5 11.4         Activity/ Discharge Instructions::   1. No heavy lifting greater than 10 pounds for minimum 6 weeks  2. Nothing in vagina (ie no tampons, douching, intercourse) for minimum of 6 weeks  3. No driving while taking narcotics   4. Return to our office as directed and call to confirm appointment  Call our office [phone] if you develop any fevers, chills, nausea/vomiting, heavy vaginal bleeding, or  redness, tenderness, and/or drainage from your wound, if you have persistent watery discharge while ambulating or stool draining from the vagina .  5. Showering is ok after shower make sure wound is dry.   6. You may keep the wound dressing and change everyday. After 2 weeks from surgery you may keep the wound dressing off.   7. You may have vaginal spotting or light bleeding which is normal.  8. If you have not had a bowel movement for 2 days, please take over the counter Milk of Magnesium, 1 tablespoon every 4 hours. After 4 doses and if you still have not had a bowel movement, please call your doctor.   9. You may eat soft diet, such as soup, liquid, for day #1 and if tolerating you may resume your regular diet.     10. YOU WILL KEEP YOUR RIVERO CATHETER for 3 weeks and then complete a cystogram prior to removal. We will call you and schedule this exam.        Follow up:  [facility] with Dr. Briscoe on 11/23/2022 at 9:30 AM.      Discharge Meds:   Ferrous sulfate 325mg: take 1 tablet by mouth every other day for anemia     Continue home medications:   Nifedipine XL 30mg  Hydrocodone-acetaminophen 5-325 mg  Ibuprofen 200 mg    Reginaldo Dueñas, MS4    Patient discussed and seen with Dr. Briscoe

## 2022-10-15 NOTE — PROGRESS NOTES
1816- Voalte message sent to Tatianna CARSON in regards to 6.3 hgb.    1840- Attempted to call ANGELIQUE Leon, patient asymptomatic.     1812- Voalte message sent to Dr. Briscoe, to inform him of her hgb 6...

## 2022-10-15 NOTE — DISCHARGE SUMMARY
Discharge Summary    CHIEF COMPLAINT ON ADMISSION  Abdominal distension and pain.       Reason for Admission  Endometriosis symptomatic stage IV with bilateral complex ovarian masses.  She was admitted to undergo definitive treatment for her stage IV endometriosis.  Patient underwent a robotic hysterectomy with bilateral salpingo-oophorectomy with extensive bilateral ureterolysis.  Intraoperatively she was found to have complete cul-de-sac obliteration which required extensive lysis of adhesions.  Due to the extensive ureterolysis she underwent placement of ureteral stents.  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 Further evaluation post surgery  reviewed of the CT scan performed preoperatively revealed absence of left kidney which was not noted on the original CT scan report. Patient was informed of the  it turns out the patient has a congenital absence of the left kidney.  She also had a right ureteral stent placement which will ultimately be removed 6 weeks and a cystotomy repair.    She was kept post surgery and monitor.  She had a significant drop in her hemoglobin to 6.5 from preoperative hemoglobin of 9.  Remarkably she was completely asymptomatic.  We have trended her hemoglobin for the next few days and she stabilized at 5.9.  She did not appear pallor.  She was completely asymptomatic.  An orthostatic vital signs was performed and she did not demonstrate any form of orthostatic hypotension.  I discussed with the patient regarding her low hemoglobin and gave her an option of blood transfusion.  Patient declined and would prefer to take iron pills thus patient was subsequently discharged home with iron pills.    By postop day #2 patient is doing well enough that she was discharged home.  Patient was informed of her newfound diagnosis of congenital absence of the left kidney.  Intraoperative findings and procedure of ligation of the left ureter was informed to the patient.  Patient was  also informed of the cystotomy repair and the right ureteral stent placement.  She will follow-up with me in 3 weeks for removal of the Gray catheter following a cystogram which will be arranged through my office and subsequently removal of the right ureteral stent in 6 weeks.    Admission Date  10/13/2022    CODE STATUS  Full Code    HPI & HOSPITAL COURSE  This is a 37 y.o. female here with the above diagnosis.    No notes on file    Therefore, she is discharged in good and stable condition to home with close outpatient follow-up.    The patient met 2-midnight criteria for an inpatient stay at the time of discharge.    Discharge Date  10/15/2022    FOLLOW UP ITEMS POST DISCHARGE  Patient to follow-up in our office in approximately 3 weeks for cystogram and removal of the Gray catheter in 6 weeks for removal of her ureteral stent.     DISCHARGE DIAGNOSES  Stage IV endometriosis with bilateral endometrioma with complete surgical resection  Congenital absence of left kidney discovered at the time of the surgery  Anemia most likely secondary to blood loss under estimated at the time of surgery however postoperatively patient was monitored and was stable she did not require blood transfusion she will be sent home on iron therapy.    FOLLOW UP    Eliseo Briscoe M.D.  [address]  [place] [address]  [phone]    Follow up on 11/23/2022  Post Operative appointment at 9:30 am      MEDICATIONS ON DISCHARGE     Medication List        CONTINUE taking these medications        Instructions   HYDROcodone-acetaminophen 5-325 MG Tabs per tablet  Commonly known as: NORCO   Take 1 Tablet by mouth every 6 hours as needed.  Dose: 1 Tablet     ibuprofen 200 MG Tabs  Commonly known as: MOTRIN   Take 800 mg by mouth every 6 hours as needed.  Dose: 800 mg            ASK your doctor about these medications        Instructions   NIFEdipine 30 MG CR tablet  Commonly known as: ADALAT CC   Take 1 Tablet by mouth every  day.  Dose: 30 mg              Allergies  Allergies   Allergen Reactions    No Known Drug Allergy        DIET  Orders Placed This Encounter   Procedures    Diet Order Diet: Regular     Standing Status:   Standing     Number of Occurrences:   1     Order Specific Question:   Diet:     Answer:   Regular [1]       ACTIVITY  As tolerated.  10-lb lifting restriction    CONSULTATIONS  None    PROCEDURES  Robotic hysterectomy with bilateral salpingo-oophorectomy with bilateral ureterolysis with right ureteral stent placement  and cystotomy repair left ureterotomy  with attempted left ureteral stent placement and ligation of the left ureter due to nonfunctioning left kidney.      LABORATORY  Lab Results   Component Value Date    SODIUM 137 10/11/2022    POTASSIUM 4.3 10/11/2022    CHLORIDE 102 10/11/2022    CO2 24 10/11/2022    GLUCOSE 68 10/11/2022    BUN 17 10/11/2022    CREATININE 0.87 10/11/2022        Lab Results   Component Value Date    WBC 6.7 10/15/2022    HEMOGLOBIN 5.9 (LL) 10/15/2022    HEMATOCRIT 19.4 (L) 10/15/2022    PLATELETCT 196 10/15/2022        Total time of the discharge process exceeds 25 minutes.

## 2022-10-15 NOTE — CARE PLAN
The patient is Stable - Low risk of patient condition declining or worsening    Shift Goals  Clinical Goals: safety, monitor labs, monitor cortez output  Patient Goals: sleep, go home in morning  Family Goals: NA    Progress made toward(s) clinical / shift goals:    Problem: Pain - Standard  Goal: Alleviation of pain or a reduction in pain to the patient’s comfort goal  Outcome: Progressing   Problem: Knowledge Deficit - Standard  Goal: Patient and family/care givers will demonstrate understanding of plan of care, disease process/condition, diagnostic tests and medications  Outcome: Progressing   Problem: Fall Risk  Goal: Patient will remain free from falls  Outcome: Progressing  Patient A&Ox4. Patient updated on plan, patient agreeable to plan. Up SBA with steady gait, fall precautions in place. Patient calling appropriately for assistance. PIV CDI with no blood return noted, saline locked. Patient reports pain, medicated per MAR. Cortez CDI, hematuria noted. MD aware. Patient updated on plan. Call light and personal belongings within reach. Hourly rounding in place.   Patient is not progressing towards the following goals:

## 2022-10-16 NOTE — PROGRESS NOTES
Pt discharged per order.  PIV removed.  Discharge paperwork reviewed with pt and sister. Gray care instructions given. All questions answered.   Pt and all belongings off unit. Pt to follow up with Dr. Briscoe's office, has appointment.

## 2022-10-16 NOTE — CARE PLAN
The patient is Stable - Low risk of patient condition declining or worsening    Shift Goals  Clinical Goals: monitor hgb, safety  Patient Goals: pain control, discharge  Family Goals: NA    Progress made toward(s) clinical / shift goals:    Problem: Pain - Standard  Goal: Alleviation of pain or a reduction in pain to the patient’s comfort goal  Outcome: Discharged - Not Met     Problem: Knowledge Deficit - Standard  Goal: Patient and family/care givers will demonstrate understanding of plan of care, disease process/condition, diagnostic tests and medications  Outcome: Discharged - Not Met     Problem: Fall Risk  Goal: Patient will remain free from falls  Outcome: Discharged - Not Met       Patient is not progressing towards the following goals:

## 2022-10-27 LAB — PATHOLOGY CONSULT NOTE: NORMAL

## 2022-11-03 NOTE — OP REPORT
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     Anesthesiologist/Type of Anesthesia:  Anesthesiologist: Eliseo Elliott M.D./General     Surgical Staff:  Assistant: Tatianna Leon P.A.-C.  Circulator: Laura Bernard R.N.  Relief Circulator: Ameena Morin R.N.  Scrub Person: Ophelia Stephen; Kaylin Marvin  Count [place]: Katarina Childs     Specimens removed if any:  ID Type Source Tests Collected by Time Destination   A : pelvic fluid Other Other PATHOLOGY SPECIMEN Eliseo Briscoe M.D. 10/13/2022  8:15 AM     B : intracystic fluid Other Other PATHOLOGY SPECIMEN Eliseo Briscoe M.D. 10/13/2022  8:25 AM     C : appendix Tissue Appendix PATHOLOGY SPECIMEN Eliseo Briscoe M.D. 10/13/2022 11:24 AM     D : uterus, cervix, bilateral fallopian tubes and ovaries Tissue Uterus PATHOLOGY SPECIMEN Eliseo Briscoe M.D. 10/13/2022 11:36 AM           Estimated Blood Loss: 400 cc     Findings: Approximately 100 cc of serous fluid was noted in both right and left costophrenic gutter diaphragm was smooth liver capsule was smooth stomach was grossly normal the spleen was unremarkable omentum appeared grossly normal abdominal peritoneal surfaces were remarkable omentum appeared grossly normal.    In the pelvis uterus was approximately 8 weeks in size.  There was an enlarged left ovary with a  smooth cystic wall about the size of the honeydew while the right ovary was about the size of a cantaloupe is a classic kissing ovaries.  There is a complete cul-de-sac obliteration with the mass compressing on the sigmoid colon the sigmoid colon was densely attached to the pouch of Nathaniel with complete obliteration.  The ovaries had extended out to the parametria to the pararectal space compressing on both the ureters.  The endometriosis was extending out to the ureter bilaterally necessitating an extensive ureterolysis.  The appendix was involved with endometriosis.  There are endometriosis brown pigmentation noted on both the right and left pelvic ureter.    Should be noted that the surgery was exceedingly difficult secondary to the complete cul-de-sac obliteration and the extensive endometriosis that was involved.  Should also be noted that we had to perform an extensive ureterolysis and after extensive ureterolysis that I performed in order to protect the ureters I elected to place bilateral ureteral stents.  The right ureteral stent was placed without difficulty however the left ureteral stent we were unable to place it.    Cystoscopic evaluation of the bladder revealed a attenuated left ureteral orifice which was barely noted.  We had injected methylene blue followed by indigocarmine there was never E flux of the dye through the left ureteral orifice after nearly 45 minutes of evaluation.  We had attempted to intubate the left ureteral orifice I was able to intubate about a centimeter in however I was unable to pass a Glidewire through the open-ended catheter.  This was done at the concurrently with the cystoscope along with the robotic endoscope evaluating intraperitoneally.  Given the fact that I was unable to intubate the left ureteral orifice we had attempted to pass a guidewire through an antegrade fashion through the ureter ureterotomy at the level of the ureteric tunnel I was unable to pass the  Glidewire from the pelvic ureter into the bladder.  What I noted it was at the time of the urethrotomy the left ureteral lumen was exceedingly attenuated I could barely get my robotic tanvi forceps into the lumen.  I would estimate that the lumen was about 4 mm in diameter which was very different from the right ureter.  I was able to manage to pass  Glidewire through the urethrotomy via retrograde fashion at the level of the ureteric tunnel but I was only able to pass it up to the level of the pelvic brim where a Coiling.  And then tried to pass the Glidewire appropriate if the left ureter at the level of the pelvic brim which created another urethrotomy site.  With a separate Glidewire I try to intubate the proximal left abdominal ureter and I was met with resistance.  Should also be noted that at the time of the urethrotomy there was no fluorescein flow of urine coming out what I did note was brownish fluid coming out of the ureter consistent with an endometriotic cyst.  I was exceedingly puzzled by this inability to intubate the ureter Dr. Santos urologist came in and evaluated CT scan and felt that patient may not have left kidney or atrophic kidney thus at this point in time I elected not to proceed with reconstruction of the left ureter and ligated the ureter at the level of the pelvic brim due to the fact that patient most likely has an atrophic left kidney.  Is unclear to me why the patient had left atrophic left kidney we will evaluate this after her surgery.     Complications: none    Pr  After adequate general anesthesia patient was prepped and draped in place modified dorsolithotomy position.  After an audible timeout was undertaken and the surgical team agree with the intended procedure we proceeded.     A 1 cm umbilical incision was made.  A Veress needle was inserted without difficulty. Abdominal pressure was noted to be < 5mm Hg.  Pneumoperitoneum was achieved to the abdominal pressure of 15  mmHg.  A 8 mm trocar was inserted without difficulty.  After completion of this, a laparoscope was placed through this port and exploratory laparoscopy revealed the above findings.  Two 8 mm ports were placed in the right upper quadrant 8 cm apart while one 8 mm port was placed in the left upper quadrant 8 cm apart.  After completion of this, the patient was placed in steep Trendelenburg position.  The robotic system was then docked and after docking the robotic system, the instrumentation was inserted under direct laparoscopic visualization to insure that there was no injury to the abdominal contents.  Once this was completed, the robotic camera was then docked.  We then proceeded with our da Cristiano portion of the procedure.      Initial focus was turned towards the dense adhesions of the sigmoid colon to the posterior aspect of the uterus.  Using monopolar shear scissors extensive lysis of adhesions was undertaken to free the sigmoid colon off the posterior aspect of the uterus to restore the normal anatomy.  This was done meticulously and sharply.  This was done without compromising the sigmoid colon.    The right posterior leaf of the broad ligament was incised.  The right pararectal spaces were developed.  The right ureter was identified in the medial leaf of the peritoneum. The avascular space of Graves was then created.  The right  ovarian vessels were then subsequently isolated and bipolar cautery was used to cauterize the  left IP and subsequently divided.  The medial leaf of the peritoneum was then incised down to the level of the uterosacral ligament.  The l right ureter was identified and was dissected laterally while developing the Okabayashi space, following completion of this the right round ligament was then divided followed by the anterior broad leaf ligament.  The deep uterine vein was skeletonized isolated cauterized well away from the ureter.    Right ureterolysis was undertaken the course of the  ureter was followed into the ureteric tunnel.  The anterior division of the right anterior hypogastric artery was then skeletonized.  The right  uterine artery was identified.  The right ureter was then followed into the l right ureteric tunnel isolating the right uterine artery.  The right uterine artery was then skeletonized and isolated hemolock clip was applied clipped and subsequently divided.  The bladder peritoneum was taken down.  The large anterior broad ligament leiomyoma was retracted ventrally to provide maximal countertraction.  The course of the right ureter was followed into the ureterovesical junction.  The right vesicle cervical artery came close proximity with a broad ligament leiomyoma the broad ligament leiomyoma at the base of it and laterally was in close proximity to the ureter at the level of the ureterovesical junction.  Meticulous careful dissection was undertaken to dissect the ureter out to ensure that we did not compromise the ureter.      The left posterior leaf of the broad ligament was incised.  The left pararectal spaces were developed.  The left ureter was identified in the medial leaf of the peritoneum. The avascular space of Graves was then created.  The left ovarian vessels were then subsequently isolated and bipolar cautery was used to cauterize the  left IP and subsequently divided.  The medial leaf of the peritoneum was then incised down to the level of the uterosacral ligament.  The left ureter was identified and was dissected laterally while developing the Okabayashi space, following completion of this the left round ligament was then divided followed by the anterior broad leaf ligament.  The deep uterine vein was skeletonized isolated cauterized well away from the ureter.    Left ureterolysis was undertaken the course of the ureter was followed into the ureteric tunnel.  The anterior division of the left anterior hypogastric artery was then skeletonized.  The left uterine  artery was identified.  The left ureter was then followed into the l left ureteric tunnel isolating the left uterine artery.  The left uterine artery was then skeletonized and isolated hemolock clip was applied clipped and subsequently divided.  The bladder peritoneum was taken down.  The large anterior broad ligament leiomyoma was retracted ventrally to provide maximal countertraction.  The course of the left ureter was followed into the ureterovesical junction.  The left vesicle cervical artery came close proximity with a broad ligament leiomyoma the broad ligament leiomyoma at the base of it and laterally was in close proximity to the ureter at the level of the ureterovesical junction.  Meticulous careful dissection was undertaken to dissect the ureter out to ensure that we did not compromise the ureter.      After completion of this the focus was then turned to the left side.  The left posterior broad ligament was incised.  The left ureter was identified.  The left ovarian vessels were skeletonized and isolated bipolar cautery was used to cauterize the left ovarian vessels and subsequently divided.  The medial leaf of the peritoneum was incised down to the level of the uterosacral ligament.  The ureter was then dissected laterally developing the old Nae space on the right side.  Upon completion of this the Prograsp from the  robotic “3rd arm” was then used to grasp the triple pedicle and counter traction was provided while the cardinal ligament was exposed. Uterine artery and vein were then subsequently skeletonized.  Using the bipolar cautery, the uterine artery and vein were then cauterized juxtaposition to the fundus of the uterus.  The remainder of the lower uterine segment was likewise divided. After completion of this, the uterus was then retracted ventrally and the uterosacral ligaments were then independently divided.  Great care was then taken to clearly not injure the ureter.  After the  uterosacral ligaments were then divided, the anterior branches of the uterine vessels were then subsequently skeletonized and cauterized with bipolar cautery and divided.  The bladder peritoneum was then subsequently taken down.  Once we were assured that the bladder was dissected off the paracervical fascia, the posterior colpotomy posterior colpotomy was made.  The vagina was circumferentially incised to complete the hysterectomy and BSO.  A 15 cm endobag was then delivered through the vagina. The specimen was delivered through the vagina. The specimen was removed through the vaginal vault without difficulty. The vaginal cuff was then closed with O Quill PDS suture in 2 layer running fashion.  The enlarged left ovary was sent for frozen section which returned as benign.  Thus we did not proceed with surgical staging.    The pelvis was then copiously irrigated with water and we established hemostasis. We then accounted for sponges and needles. Once this was confirmed correct, robotic instrumentation with withdraw and pneumoperitoneum was allowed to escape through the 8 mm ports. After appropriate expelling all the intrabdominal pneumoperitoneum, we irrigated the subcutaneous tissue with water. The skin was reapproximated with 3-0 monocryl suture. The incision was then injected with 0.25% Marcaine for local anesthetic effect. Dressings were appropriately placed.      Patient tolerated the procedure well without any difficulties and was extubated and transferred to the PACU in stable excellet condition.       ocedure:

## 2022-11-04 ENCOUNTER — HOSPITAL ENCOUNTER (OUTPATIENT)
Dept: RADIOLOGY | Facility: MEDICAL CENTER | Age: 37
End: 2022-11-04
Attending: SPECIALIST
Payer: MEDICAID

## 2022-11-04 DIAGNOSIS — D64.9 ANEMIA, UNSPECIFIED TYPE: ICD-10-CM

## 2022-11-04 DIAGNOSIS — N83.291 OTHER OVARIAN CYST, RIGHT SIDE: ICD-10-CM

## 2022-11-04 DIAGNOSIS — N83.202 UNSPECIFIED OVARIAN CYST, LEFT SIDE: ICD-10-CM

## 2022-11-04 DIAGNOSIS — R10.2 PELVIC AND PERINEAL PAIN: ICD-10-CM

## 2022-11-04 PROCEDURE — 51600 INJECTION FOR BLADDER X-RAY: CPT

## 2022-11-04 PROCEDURE — 700117 HCHG RX CONTRAST REV CODE 255: Performed by: SPECIALIST

## 2022-11-04 RX ADMIN — IOHEXOL 25 ML: 240 INJECTION, SOLUTION INTRATHECAL; INTRAVASCULAR; INTRAVENOUS; ORAL at 11:30

## 2022-11-04 RX ADMIN — IOHEXOL 100 ML: 240 INJECTION, SOLUTION INTRATHECAL; INTRAVASCULAR; INTRAVENOUS; ORAL at 11:30

## 2022-11-23 RX ORDER — NIFEDIPINE 30 MG
30 TABLET, EXTENDED RELEASE ORAL EVERY MORNING
Qty: 30 TABLET | Refills: 0 | Status: SHIPPED | OUTPATIENT
Start: 2022-11-23 | End: 2022-12-09

## 2022-12-09 RX ORDER — NIFEDIPINE 30 MG
TABLET, EXTENDED RELEASE ORAL
Qty: 90 TABLET | Refills: 1 | Status: SHIPPED | OUTPATIENT
Start: 2022-12-09

## 2024-06-26 ENCOUNTER — NON-PROVIDER VISIT (OUTPATIENT)
Dept: URGENT CARE | Facility: PHYSICIAN GROUP | Age: 39
End: 2024-06-26

## 2024-06-26 DIAGNOSIS — Z02.1 PRE-EMPLOYMENT DRUG SCREENING: ICD-10-CM

## 2024-06-26 LAB
AMP AMPHETAMINE: NORMAL
COC COCAINE: NORMAL
INT CON NEG: NORMAL
INT CON POS: NORMAL
MET METHAMPHETAMINES: NORMAL
OPI OPIATES: NORMAL
PCP PHENCYCLIDINE: NORMAL
POC DRUG COMMENT 753798-OCCUPATIONAL HEALTH: NORMAL
THC: NORMAL

## 2025-07-23 ENCOUNTER — HOSPITAL ENCOUNTER (EMERGENCY)
Facility: MEDICAL CENTER | Age: 40
End: 2025-07-23
Payer: MEDICAID

## 2025-07-23 VITALS
TEMPERATURE: 97.9 F | HEART RATE: 100 BPM | WEIGHT: 122.8 LBS | RESPIRATION RATE: 18 BRPM | OXYGEN SATURATION: 99 % | DIASTOLIC BLOOD PRESSURE: 185 MMHG | BODY MASS INDEX: 20.43 KG/M2 | SYSTOLIC BLOOD PRESSURE: 247 MMHG

## 2025-07-23 PROCEDURE — 302449 STATCHG TRIAGE ONLY (STATISTIC)

## 2025-07-24 ENCOUNTER — HOSPITAL ENCOUNTER (INPATIENT)
Facility: MEDICAL CENTER | Age: 40
End: 2025-07-24
Attending: EMERGENCY MEDICINE | Admitting: INTERNAL MEDICINE
Payer: MEDICAID

## 2025-07-24 ENCOUNTER — APPOINTMENT (OUTPATIENT)
Dept: RADIOLOGY | Facility: MEDICAL CENTER | Age: 40
DRG: 683 | End: 2025-07-24
Attending: EMERGENCY MEDICINE
Payer: MEDICAID

## 2025-07-24 ENCOUNTER — OFFICE VISIT (OUTPATIENT)
Dept: URGENT CARE | Facility: PHYSICIAN GROUP | Age: 40
End: 2025-07-24
Payer: MEDICAID

## 2025-07-24 VITALS
HEIGHT: 65 IN | RESPIRATION RATE: 16 BRPM | BODY MASS INDEX: 20.54 KG/M2 | SYSTOLIC BLOOD PRESSURE: 250 MMHG | OXYGEN SATURATION: 100 % | WEIGHT: 123.3 LBS | HEART RATE: 100 BPM | TEMPERATURE: 98.4 F | DIASTOLIC BLOOD PRESSURE: 170 MMHG

## 2025-07-24 DIAGNOSIS — I16.1 HYPERTENSIVE EMERGENCY: Primary | ICD-10-CM

## 2025-07-24 DIAGNOSIS — R31.9 HEMATURIA, UNSPECIFIED TYPE: ICD-10-CM

## 2025-07-24 PROBLEM — N17.9 AKI (ACUTE KIDNEY INJURY) (HCC): Status: ACTIVE | Noted: 2025-07-24

## 2025-07-24 LAB
APPEARANCE UR: NORMAL
BILIRUB UR STRIP-MCNC: NORMAL MG/DL
COLOR UR AUTO: NORMAL
GLUCOSE UR STRIP.AUTO-MCNC: NORMAL MG/DL
KETONES UR STRIP.AUTO-MCNC: NORMAL MG/DL
LEUKOCYTE ESTERASE UR QL STRIP.AUTO: NORMAL
NITRITE UR QL STRIP.AUTO: NORMAL
PH UR STRIP.AUTO: 7 [PH] (ref 5–8)
PROT UR QL STRIP: >=300 MG/DL
RBC UR QL AUTO: NORMAL
SP GR UR STRIP.AUTO: 1.02
UROBILINOGEN UR STRIP-MCNC: 1 MG/DL

## 2025-07-24 PROCEDURE — 70496 CT ANGIOGRAPHY HEAD: CPT

## 2025-07-24 PROCEDURE — 74177 CT ABD & PELVIS W/CONTRAST: CPT

## 2025-07-24 PROCEDURE — 71045 X-RAY EXAM CHEST 1 VIEW: CPT

## 2025-07-24 ASSESSMENT — ENCOUNTER SYMPTOMS
FLANK PAIN: 0
VOMITING: 0
DIARRHEA: 0
SORE THROAT: 0
SHORTNESS OF BREATH: 0
HEADACHES: 0
COUGH: 0
ABDOMINAL PAIN: 0
CHILLS: 0
FEVER: 0
NAUSEA: 0
MYALGIAS: 0

## 2025-07-24 ASSESSMENT — PAIN DESCRIPTION - PAIN TYPE: TYPE: ACUTE PAIN

## 2025-07-24 ASSESSMENT — PAIN SCALES - GENERAL: PAINLEVEL_OUTOF10: NO PAIN

## 2025-07-24 NOTE — PROGRESS NOTES
Subjective:   Lissette Lee is a 40 y.o. female who presents for Blood in Urine (For a couple of weeks )      Other  This is a new problem. The current episode started 1 to 4 weeks ago (Patient has noted blood with clots over the past 2 weeks and her urine.). The problem has been gradually worsening. Associated symptoms include urinary symptoms. Pertinent negatives include no abdominal pain, chest pain, chills, congestion, coughing, fever, headaches, myalgias, nausea, rash, sore throat or vomiting. Associated symptoms comments: Patient does have noted hypertension of 250/170 and does have a history of hypertension but not currently on any medications.. She has tried nothing for the symptoms.       Review of Systems   Constitutional:  Negative for chills, fever and malaise/fatigue.   HENT:  Negative for congestion, ear pain, hearing loss and sore throat.    Respiratory:  Negative for cough and shortness of breath.    Cardiovascular:  Negative for chest pain.   Gastrointestinal:  Negative for abdominal pain, diarrhea, nausea and vomiting.   Genitourinary:  Positive for hematuria. Negative for dysuria and flank pain.   Musculoskeletal:  Negative for myalgias.   Skin:  Negative for rash.   Neurological:  Negative for headaches.       Allergies[1]    Patient Active Problem List    Diagnosis Date Noted    Endometriosis 10/13/2022    Mass of ovary 09/22/2022       Current Medications and Prescriptions Ordered in Epic[2]    Past Surgical History:   Procedure Laterality Date    KY CYSTOSCOPY,INSERT URETERAL STENT Bilateral 10/13/2022    Procedure: CYSTOSCOPY, WITH URETERAL STENT INSERTION;  Surgeon: Eliseo Briscoe M.D.;  Location: SURGERY [facility];  Service: Gyn Robotic    KY LAP,APPENDECTOMY  10/13/2022    Procedure: APPENDECTOMY, ROBOT-ASSISTED, USING DA JUANCHO XI;  Surgeon: Eliseo Briscoe M.D.;  Location: SURGERY [facility];  Service: Gyn Robotic    HYSTERECTOMY ROBOTIC XI  10/13/2022    Procedure: ROBOTIC  "HYSTERECTOMY,BILATERAL SALPINGO OOPHORECTOMY RIGHT URETERAL STENT, LEFT URETERAL LIGATION;  Surgeon: Eliseo Briscoe M.D.;  Location: SURGERY [facility];  Service: Gyn Robotic    SALPINGO OOPHORECTOMY Bilateral 10/13/2022    Procedure: SALPINGO-OOPHORECTOMY;  Surgeon: Eliseo Briscoe M.D.;  Location: SURGERY [facility];  Service: Gyn Robotic       Social History[3]    family history includes Breast Cancer in her sister; Cancer in her maternal grandmother.     Problem list, medications, and allergies reviewed by myself today in Epic.     Objective:   BP (!) 250/170 (BP Location: Right arm, Patient Position: Sitting, BP Cuff Size: Adult) Comment: provider made aware*  Pulse 100   Temp 36.9 °C (98.4 °F) (Temporal)   Resp 16   Ht 1.651 m (5' 5\")   Wt 55.9 kg (123 lb 4.8 oz)   LMP 09/13/2022 (Exact Date)   SpO2 100%   BMI 20.52 kg/m²     Physical Exam  Vitals and nursing note reviewed.   Constitutional:       General: She is not in acute distress.     Appearance: Normal appearance. She is not ill-appearing.   HENT:      Head: Normocephalic and atraumatic.      Right Ear: Tympanic membrane normal.      Left Ear: Tympanic membrane normal.      Nose: Nose normal.      Mouth/Throat:      Mouth: Mucous membranes are moist.   Eyes:      Conjunctiva/sclera: Conjunctivae normal.   Cardiovascular:      Rate and Rhythm: Normal rate.      Heart sounds: Normal heart sounds.   Pulmonary:      Effort: Pulmonary effort is normal.   Abdominal:      General: Abdomen is flat.      Palpations: Abdomen is soft.      Tenderness: There is no abdominal tenderness. There is no right CVA tenderness, left CVA tenderness or guarding.   Musculoskeletal:         General: Normal range of motion.      Cervical back: Normal range of motion.   Skin:     General: Skin is warm and dry.      Capillary Refill: Capillary refill takes less than 2 seconds.   Neurological:      Mental Status: She is alert and oriented to person, place, and time. "   Psychiatric:         Mood and Affect: Mood normal.         Behavior: Behavior normal.       Results for orders placed or performed in visit on 07/24/25   POCT Urinalysis    Collection Time: 07/24/25  3:01 PM   Result Value Ref Range    POC Color Digna Negative    POC Appearance Turbid Negative    POC Glucose neg Negative mg/dL    POC Bilirubin neg Negative mg/dL    POC Ketones neg Negative mg/dL    POC Specific Gravity 1.020 <1.005 - >1.030    POC Blood Large Negative    POC Urine PH 7.0 5.0 - 8.0    POC Protein >=300 Negative mg/dL    POC Urobiligen 1.0 Negative (0.2) mg/dL    POC Nitrites neg Negative    POC Leukocyte Esterase Trace Negative         Assessment/Plan:     1. Hypertensive emergency        2. Hematuria, unspecified type  POCT Urinalysis      After assessment patient here with hypertensive emergency.  Patient blood pressure in office was 250/170 and she does report associated hematuria for the past 2 weeks.  Patient attempted to go to ER last night for her high blood pressure but did end up leaving due to the wait.  Patient does report history of hypertension but not currently on any hypertensive medications.  Patient denies any headache or vision changes but has been concerned about the blood in urine stating that she has also had noted clots in the urine.  UA in office did show large amount of blood along with greater than 300 mg/dL of protein.  At this time I did discuss with patient the concerning findings of an acute kidney injury along with extremely elevated blood pressure and instructed her that she needed further workup in the ER.  Patient is agreeable to this and will have her mom drive her POV to main.  Transfer center contacted about patient arrival.    Differential diagnosis, natural history, and supportive care discussed. We also reviewed side effects of medication including allergic response, GI upset, tendon injury, rash, sedation etc. Patient and/or guardian voices understanding.       Advised the patient to follow-up with the primary care physician for recheck, reevaluation, and consideration of further management.    Please note that this dictation was created using voice recognition software. I have made every reasonable attempt to correct obvious errors, but I expect that there are errors of grammar and possibly content that I did not discover before finalizing the note.    This note was electronically signed by STARLA Stover          [1]   Allergies  Allergen Reactions    No Known Drug Allergy    [2]   No current [facility]-ordered outpatient medications on file.     No current [facility]-ordered facility-administered medications on file.   [3]   Social History  Tobacco Use    Smoking status: Never    Smokeless tobacco: Never   Vaping Use    Vaping status: Never Used   Substance Use Topics    Alcohol use: Yes     Comment: 2 per week    Drug use: Yes     Types: Inhaled     Comment: marijuana a couple times per day, last smoke 10/09/22

## 2025-07-24 NOTE — ED TRIAGE NOTES
Chief Complaint   Patient presents with    Blood in Urine     X 2 weeks. PT denies pain.      Pulse 100   Temp 36.6 °C (97.9 °F) (Temporal)   Resp 18   Wt 55.7 kg (122 lb 12.7 oz)   LMP 09/13/2022 (Exact Date)   SpO2 99%   BMI 20.43 kg/m²

## 2025-07-25 DIAGNOSIS — R31.0 GROSS HEMATURIA: Primary | ICD-10-CM

## 2025-07-25 PROBLEM — N12 PYELONEPHRITIS: Status: ACTIVE | Noted: 2025-07-25

## 2025-07-25 PROBLEM — I16.0 HYPERTENSIVE URGENCY: Status: ACTIVE | Noted: 2025-07-25

## 2025-07-25 PROBLEM — F15.10 AMPHETAMINE ABUSE (HCC): Status: ACTIVE | Noted: 2025-07-25

## 2025-07-25 PROBLEM — E87.6 HYPOKALEMIA: Status: ACTIVE | Noted: 2025-07-25

## 2025-07-25 PROBLEM — R79.89 ELEVATED TROPONIN: Status: ACTIVE | Noted: 2025-07-25

## 2025-07-25 PROBLEM — R31.9 HEMATURIA: Status: ACTIVE | Noted: 2025-07-25

## 2025-07-25 PROBLEM — R51.9 HEADACHE: Status: ACTIVE | Noted: 2025-07-25

## 2025-07-25 ASSESSMENT — ENCOUNTER SYMPTOMS
DIARRHEA: 0
WEIGHT LOSS: 0
BLURRED VISION: 0
NAUSEA: 0
DOUBLE VISION: 0
NECK PAIN: 0
FLANK PAIN: 0
SPUTUM PRODUCTION: 0
VOMITING: 0
HEMOPTYSIS: 0
BACK PAIN: 0
PALPITATIONS: 0
NERVOUS/ANXIOUS: 0
HALLUCINATIONS: 0
ABDOMINAL PAIN: 1
ABDOMINAL PAIN: 0
WEAKNESS: 0
ORTHOPNEA: 0
FEVER: 0
TREMORS: 0
SPEECH CHANGE: 0
COUGH: 0
FOCAL WEAKNESS: 0
POLYDIPSIA: 0
FLANK PAIN: 1
BRUISES/BLEEDS EASILY: 0
HEARTBURN: 0
SHORTNESS OF BREATH: 0
PHOTOPHOBIA: 0
SORE THROAT: 0
HEADACHES: 1
CHILLS: 0

## 2025-07-25 ASSESSMENT — LIFESTYLE VARIABLES
TOTAL SCORE: 0
TOTAL SCORE: 0
ON A TYPICAL DAY WHEN YOU DRINK ALCOHOL HOW MANY DRINKS DO YOU HAVE: 2
DOES PATIENT WANT TO STOP DRINKING: NO
SUBSTANCE_ABUSE: 0
SUBSTANCE_ABUSE: 1
HAVE YOU EVER FELT YOU SHOULD CUT DOWN ON YOUR DRINKING: NO
HOW MANY TIMES IN THE PAST YEAR HAVE YOU HAD 5 OR MORE DRINKS IN A DAY: 0
ALCOHOL_USE: YES
CONSUMPTION TOTAL: NEGATIVE
EVER HAD A DRINK FIRST THING IN THE MORNING TO STEADY YOUR NERVES TO GET RID OF A HANGOVER: NO
AVERAGE NUMBER OF DAYS PER WEEK YOU HAVE A DRINK CONTAINING ALCOHOL: 1
TOTAL SCORE: 0
EVER FELT BAD OR GUILTY ABOUT YOUR DRINKING: NO
HAVE PEOPLE ANNOYED YOU BY CRITICIZING YOUR DRINKING: NO

## 2025-07-25 ASSESSMENT — COGNITIVE AND FUNCTIONAL STATUS - GENERAL
DAILY ACTIVITIY SCORE: 24
SUGGESTED CMS G CODE MODIFIER DAILY ACTIVITY: CH
MOBILITY SCORE: 24
SUGGESTED CMS G CODE MODIFIER MOBILITY: CH

## 2025-07-25 ASSESSMENT — PAIN DESCRIPTION - PAIN TYPE
TYPE: ACUTE PAIN

## 2025-07-25 ASSESSMENT — FIBROSIS 4 INDEX: FIB4 SCORE: 1.85

## 2025-07-25 NOTE — ED NOTES
Pt is awake and crying in pain headache pain score 10/10.  BP and HR elevated _ ERP made aware    Pt medicated per MAR

## 2025-07-25 NOTE — PROGRESS NOTES
Hospital Medicine Daily Progress Note    Date of Service  7/25/2025    Chief Complaint  Lissette Lee is a 40 y.o. female admitted 7/24/2025 with hematuria    Hospital Course  No notes on file    Interval Problem Update  Patient was seen and examined at bedside.  No acute events overnight. Patient is resting comfortably in bed and in no acute distress.     Consults placed to urology, nephrology  IV Rocephin  Outpatient cystoscopy recommended      I have discussed this patient's plan of care and discharge plan at IDT rounds today with Case Management, Nursing, Nursing leadership, and other members of the IDT team.    Consultants/Specialty  nephrology and urology    Code Status  Full Code    Disposition  The patient is not medically cleared for discharge to home or a post-acute facility.      I have placed the appropriate orders for post-discharge needs.    Review of Systems  Review of Systems   Constitutional:  Negative for chills and fever.   HENT:  Negative for congestion.    Eyes:  Negative for blurred vision and double vision.   Respiratory:  Negative for cough and shortness of breath.    Cardiovascular:  Negative for chest pain and palpitations.   Gastrointestinal:  Negative for nausea and vomiting.   Genitourinary:  Positive for flank pain and hematuria.   Musculoskeletal:  Negative for back pain.   Neurological:  Positive for headaches.   Psychiatric/Behavioral:  Positive for substance abuse.         Physical Exam  Temp:  [36.4 °C (97.5 °F)-36.9 °C (98.4 °F)] 36.4 °C (97.5 °F)  Pulse:  [100-119] 106  Resp:  [10-27] 16  BP: (164-250)/(111-184) 164/111  SpO2:  [91 %-100 %] 92 %    Physical Exam  Constitutional:       General: She is not in acute distress.  HENT:      Head: Normocephalic.      Right Ear: External ear normal.      Left Ear: External ear normal.      Nose: No congestion.   Eyes:      General: No scleral icterus.  Cardiovascular:      Rate and Rhythm: Normal rate.   Pulmonary:      Breath  sounds: No wheezing.   Abdominal:      Tenderness: There is no abdominal tenderness. There is no guarding or rebound.   Musculoskeletal:         General: No swelling.   Skin:     Coloration: Skin is not jaundiced.      Findings: No bruising.   Neurological:      General: No focal deficit present.      Mental Status: She is alert.      Motor: No weakness.   Psychiatric:         Mood and Affect: Mood normal.         Behavior: Behavior normal.         Fluids    Intake/Output Summary (Last 24 hours) at 7/25/2025 1218  Last data filed at 7/25/2025 0800  Gross per 24 hour   Intake 1400 ml   Output 700 ml   Net 700 ml        Laboratory  Recent Labs     07/24/25 1842 07/24/25  2326   WBC 7.3 8.7   RBC 5.08 5.24   HEMOGLOBIN 11.0* 11.5*   HEMATOCRIT 35.3* 37.1   MCV 69.5* 70.8*   MCH 21.7* 21.9*   MCHC 31.2* 31.0*   RDW 50.6* 52.2*   PLATELETCT 113* 143*     Recent Labs     07/24/25 1842 07/24/25 2326   SODIUM 140 135   POTASSIUM 3.5* 3.5*   CHLORIDE 103 99   CO2 22 20   GLUCOSE 118* 124*   BUN 33* 31*   CREATININE 2.77* 2.77*   CALCIUM 8.2* 8.6     Recent Labs     07/24/25 1842   APTT 30.6   INR 0.92               Imaging  CT-CTA HEAD WITH & W/O-POST PROCESS   Final Result         1.  No large vessel occlusion or aneurysm identified      CT-ABDOMEN-PELVIS WITH   Final Result         1.  Hazy right perinephric fat stranding, consider component of pyelonephritis.   2.  Hepatomegaly.   3.  Mild intrahepatic and extrahepatic biliary ductal dilatation, consider causes of biliary obstruction with additional workup as clinically appropriate.            DX-CHEST-PORTABLE (1 VIEW)   Final Result      No acute cardiopulmonary disease.      EC-ECHOCARDIOGRAM COMPLETE W/O CONT    (Results Pending)        Assessment/Plan  * TACOS (acute kidney injury) (HCC)- (present on admission)  Assessment & Plan  40-year-old female with history of uncontrolled hypertension, reported solitary kidney  CT of the abdomen pelvis with contrast showed  possible inflammation of the right kidney, left kidney is not visualized.  No hydronephrosis  UA showed proteinuria, hematuria, no casts  Concern for possible glomerulonephritis    - connective tissue panel, IgA, hepatitis, HIV, complement    - urine spot protein/creatinine  Consult placed to nephrology      Hematuria  Assessment & Plan  Hematuria with blood clots on and off in the last 2 weeks  Urology consulted  Outpatient cystoscopy recommended    Hypertensive urgency  Assessment & Plan  Blood pressure at urgent care is as high as 240/170, improved after IV hydralazine  Started bisoprolol  Continue hydralazine and clonidine p.o.  Continue IV hydralazine and labetalol for systolic blood pressure above 180  Monitor blood pressure every 4 hours    Elevated troponin  Assessment & Plan  Troponin 55, proBNP 34,514.  Suspected cardiomyopathy, perhaps hypertensive in etiology  Likely sequelae of hypertensive urgency in setting of TACOS  Echo ordered    Pyelonephritis  Assessment & Plan  Reported mild right CVA tenderness.  UA positive for bacteria, leukocyte esterase, WBC elevation  CT of the abdomen showed possible pyelonephritis  Continue IV ceftriaxone  Urine culture pending    Amphetamine abuse (HCC)  Assessment & Plan  UDS positive amphetamines    Headache  Assessment & Plan  Probably secondary to uncontrolled hypertension.  CT a of the head with and without contrast showed no acute abnormalities  Neurochecks every 4 hours  Tylenol, oxycodone as needed  Blood pressure control    Hypokalemia  Assessment & Plan  Potassium 3.5.  Replacement ordered         VTE prophylaxis: VTE Selection    I have performed a physical exam and reviewed and updated ROS and Plan today (7/25/2025). In review of yesterday's note (7/24/2025), there are no changes except as documented above.    Greater than 51 minutes spent prepping to see patient (e.g. review of tests) obtaining and/or reviewing separately obtained history. Performing a  medically appropriate examination and/ evaluation.  Counseling and educating the patient/family/caregiver.  Ordering medications, tests, or procedures.  Referring and communicating with other health care professionals.  Documenting clinical information in EPIC.  Independently interpreting results and communicating results to patient/family/caregiver.  Care coordination.

## 2025-07-25 NOTE — ASSESSMENT & PLAN NOTE
- New swelling noted to RLE following IR procedure on presentation  - General surgery consulted- hematoma evacuation performed at bedside  - S/p  wound debridement and wound vac placement on 4/21. Plan to takeback to OR Thursday 4/24 for wound debridement + vac reapplication  - Heparin gtt held- remains held given risk of AC more that benefit at this time per discussion with surgery - communicated with patient and demonstrates understanding  - WV placed 4/21 - management per surgery   UDS positive amphetamines

## 2025-07-25 NOTE — ASSESSMENT & PLAN NOTE
Reported mild right CVA tenderness.  UA positive for bacteria, leukocyte esterase, WBC elevation  CT of the abdomen showed possible pyelonephritis  Urine culture - gardnerella vaginalis   -Continue Flagyl; 7 day course. End date 8/3

## 2025-07-25 NOTE — ASSESSMENT & PLAN NOTE
Probably secondary to uncontrolled hypertension.  CT a of the head with and without contrast showed no acute abnormalities  Improved  - Discontinue neurochecks every 4 hours  - Pain medication as needed

## 2025-07-25 NOTE — PROGRESS NOTES
4 Eyes Skin Assessment Completed by VERENICE Sanchez and TAVO Mccarty.    Skin assessment is primarily focused on high risk bony prominences. Pay special attention to skin beneath and around medical devices, high risk bony prominences, skin to skin areas and areas where the patient lacks sensation to feel pain and areas where the patient previously had breakdown.     Head (Occipital):  WDL   Ears (Under Medical Devices): Red   Nose (Under Medical Devices): WDL   Mouth:  WDL   Neck: WDL   Breast/Chest:  WDL   Shoulder Blades:  WDL   Spine:   WDL   (R) Arm/Elbow/Hand: WDL   (L) Arm/Elbow/Hand: WDL   Abdomen: WDL   Pannus/Groin:  WDL   Sacrum/Coccyx:   WDL   (R) Ischial Tuberosity (Sit Bones):  WDL   (L) Ischial Tuberosity (Sit Bones):  WDL   (R) Leg:  WDL   (L) Leg:  WDL   (R) Heel:  WDL   (R) Foot/Toe: Rash and Red   (L) Heel: WDL   (L) Foot/Toe:  Rash and Red       DEVICES IN USE:   Respiratory Devices:  NA, patient on room air  Feeding Devices:  N/A   Lines & BP Monitoring Devices:  Peripheral IV, BP cuff, and Pulse ox    Orthopedic Devices:  N/A  Miscellaneous Devices:  Telemetry monitor    PROTOCOL INTERVENTIONS:   Standard/Trauma Bed:  Already in place    WOUND PHOTOS:   Completed and in EPIC     WOUND CONSULT:   N/A, no advanced wound care needs identified

## 2025-07-25 NOTE — ED TRIAGE NOTES
"Chief Complaint   Patient presents with    Blood in Urine     Pt was sent for protein in her urine that was see today at         Pt prescribed HTN medication but is non compliant     Denies CP or SOB     Pt is alert/oriented and follows commands. Pt speaking in full sentences and responds appropriately to questions. No acute distress noted in triage and respirations are even and unlabored.      Pt placed in lobby and educated on triage process. Pt encouraged to alert staff for any changes in condition.  BP (!) 241/173   Pulse (!) 102   Temp 36.6 °C (97.9 °F) (Temporal)   Resp 18   Ht 1.651 m (5' 5\")   Wt 56.7 kg (125 lb)   LMP 09/13/2022 (Exact Date)   SpO2 99%   BMI 20.80 kg/m²     "

## 2025-07-25 NOTE — CARE PLAN
The patient is Watcher - Medium risk of patient condition declining or worsening    Shift Goals  Clinical Goals: monitor BP, pain management  Patient Goals: rest, comfort  Family Goals: berry    Progress made toward(s) clinical / shift goals:        Problem: Pain - Standard  Goal: Alleviation of pain or a reduction in pain to the patient’s comfort goal  Outcome: Progressing     Problem: Knowledge Deficit - Standard  Goal: Patient and family/care givers will demonstrate understanding of plan of care, disease process/condition, diagnostic tests and medications  Outcome: Progressing       Patient is not progressing towards the following goals:

## 2025-07-25 NOTE — ED NOTES
Pt is awake on bed, complaining of headache pain score 9/10, /125    Medications given as ordered. Pt tolerated.

## 2025-07-25 NOTE — ED NOTES
Pt is awake complains of headache pain score 7/10-- ERP made aware-- medication given as ordered    Pt to CT   Subjective:     Flower Perez     Chief Complaint   Patient presents with    Left Knee - Pain    Right Knee - Pain       HPI    Flower is a 75 y.o. female coming in today for bilateral, L>R, knee pain that began 1 month(s) ago, referred by Dr. Garcia. Pt. describes the pain as a 5/10 stiffness that does not radiate. Pt c/o fluid in her left knee. There was not a fall/injury/ or trauma associated with the onset of symptoms. The pain is better with rest, aspiration/CSI and worse with standing from sitting, driving. Pt saw Víctor Dunaway PA-C in 2017 with a left knee aspiration and CSI. She was scheduled for the Euflexxa series but did not complete this. Pt. Denies any other musculoskeletal complaints at this time.     Joint instability? yes  Mechanical locking/clicking? yes  Affecting ADL's? yes  Affecting sleep? no    Occupation:  part time    Review of Systems   Constitutional: Negative for chills and fever.   HENT: Negative for hearing loss and tinnitus.    Eyes: Negative for blurred vision and photophobia.   Respiratory: Negative for cough and shortness of breath.    Cardiovascular: Negative for chest pain and leg swelling.   Gastrointestinal: Negative for abdominal pain, heartburn, nausea and vomiting.   Genitourinary: Negative for dysuria and hematuria.   Musculoskeletal: Positive for joint pain. Negative for back pain, falls, myalgias and neck pain.   Skin: Negative for rash.   Neurological: Negative for dizziness, tingling, focal weakness, weakness and headaches.   Endo/Heme/Allergies: Negative for environmental allergies. Does not bruise/bleed easily.   Psychiatric/Behavioral: Negative for depression. The patient is not nervous/anxious.        PAST MEDICAL HISTORY:   Past Medical History:   Diagnosis Date    Allergy     Atherosclerosis of native coronary artery of native heart without angina pectoris 12/12/2016    Cataract associated with type 2 diabetes mellitus 12/12/2016    Diabetes  mellitus     Diabetes mellitus type II     Disorder of kidney and ureter     GERD (gastroesophageal reflux disease)     Gout     Gout synovitis     Hyperlipidemia     Hypertension     Neuromuscular disorder     Type 2 diabetes mellitus with diabetic neuropathy, without long-term current use of insulin      PAST SURGICAL HISTORY:   Past Surgical History:   Procedure Laterality Date    ANKLE FRACTURE SURGERY      CATARACT EXTRACTION W/  INTRAOCULAR LENS IMPLANT Right 10/09/2018    Dr. Wray    CATARACT EXTRACTION W/  INTRAOCULAR LENS IMPLANT Left 10/23/2018    Dr. Wray    INTRAOCULAR PROSTHESES INSERTION Right 10/9/2018    Procedure: INSERTION, IOL PROSTHESIS;  Surgeon: Daniel Wray MD;  Location: [facility] OR [address] [place] [address];  Service: Ophthalmology;  Laterality: Right;    INTRAOCULAR PROSTHESES INSERTION Left 10/23/2018    Procedure: INSERTION, IOL PROSTHESIS;  Surgeon: Daniel Wray MD;  Location: [facility] OR [address] [place] [address];  Service: Ophthalmology;  Laterality: Left;    PHACOEMULSIFICATION OF CATARACT Right 10/9/2018    Procedure: PHACOEMULSIFICATION, CATARACT;  Surgeon: Daniel Wray MD;  Location: [facility] OR [address] [place] [address];  Service: Ophthalmology;  Laterality: Right;    PHACOEMULSIFICATION OF CATARACT Left 10/23/2018    Procedure: PHACOEMULSIFICATION, CATARACT;  Surgeon: Daniel Wray MD;  Location: [facility] OR [address] [place] [address];  Service: Ophthalmology;  Laterality: Left;    rectal fistula       FAMILY HISTORY:   Family History   Problem Relation Age of Onset    Hyperlipidemia Mother     Heart disease Mother     Diabetes Father     Stroke Father     Diabetes Brother     Hypertension Brother     Peripheral vascular disease Brother     No Known Problems Daughter     Hypertension Son     Sleep apnea Son     Cancer Maternal Uncle     Cancer Maternal Grandmother         rectal    Amblyopia Neg Hx     Blindness Neg Hx     Cataracts Neg Hx     Glaucoma Neg Hx     Macular degeneration  Neg Hx     Retinal detachment Neg Hx     Strabismus Neg Hx      SOCIAL HISTORY:   Social History     Socioeconomic History    Marital status:      Spouse name: Not on file    Number of children: Not on file    Years of education: Not on file    Highest education level: Not on file   Occupational History    Occupation: retired   Social Needs    Financial resource strain: Not on file    Food insecurity:     Worry: Not on file     Inability: Not on file    Transportation needs:     Medical: Not on file     Non-medical: Not on file   Tobacco Use    Smoking status: Never Smoker    Smokeless tobacco: Never Used   Substance and Sexual Activity    Alcohol use: No    Drug use: No    Sexual activity: Never   Lifestyle    Physical activity:     Days per week: Not on file     Minutes per session: Not on file    Stress: Not on file   Relationships    Social connections:     Talks on phone: Not on file     Gets together: Not on file     Attends Latter day service: Not on file     Active member of club or organization: Not on file     Attends meetings of clubs or organizations: Not on file     Relationship status: Not on file   Other Topics Concern    Not on file   Social History Narrative    Not on file       MEDICATIONS:   Current Outpatient Medications:     acetaminophen (TYLENOL) 500 MG tablet, Take 500 mg by mouth every 6 (six) hours as needed for Pain., Disp: , Rfl:     allopurinol (ZYLOPRIM) 100 MG tablet, TAKE 1 TABLET EVERY DAY, Disp: 90 tablet, Rfl: 2    amLODIPine (NORVASC) 10 MG tablet, TAKE 1 TABLET EVERY DAY, Disp: 90 tablet, Rfl: 3    blood pressure test kit-large Kit, 1 blood pressure cuff; please take blood pressure daily in the morning (Patient not taking: Reported on 1/15/2020), Disp: 1 each, Rfl: 0    blood sugar diagnostic (TRUETEST TEST STRIPS) Strp, Once daily testing (Patient not taking: Reported on 1/15/2020), Disp: 100 strip, Rfl: 4    blood-glucose meter kit, Use as  instructed; True Result meter per [employer] (Patient not taking: Reported on 1/15/2020), Disp: 1 each, Rfl: 0    cetirizine (ZYRTEC) 10 MG tablet, Take 1 tablet (10 mg total) by mouth once daily., Disp: 30 tablet, Rfl: 2    clotrimazole-betamethasone 1-0.05% (LOTRISONE) cream, Apply topically 2 (two) times daily., Disp: 45 g, Rfl: 3    diclofenac sodium (VOLTAREN) 1 % Gel, Apply 2 g topically once daily. (Patient not taking: Reported on 1/15/2020), Disp: 100 g, Rfl: 3    ergocalciferol (ERGOCALCIFEROL) 50,000 unit Cap, TAKE 1 CAPSULE BY MOUTH 1 TIME A WEEK FOR 12 DOSES (Patient not taking: Reported on 1/15/2020), Disp: 13 capsule, Rfl: 0    ergocalciferol (ERGOCALCIFEROL) 50,000 unit Cap, TAKE 1 CAPSULE BY MOUTH 1 TIME A WEEK FOR 12 DOSES, Disp: 13 capsule, Rfl: 0    fluticasone (FLONASE) 50 mcg/actuation nasal spray, 1 spray (50 mcg total) by Each Nare route once daily. (Patient not taking: Reported on 1/15/2020), Disp: 1 Bottle, Rfl: 0    hydroCHLOROthiazide (HYDRODIURIL) 25 MG tablet, TAKE 1 TABLET DAILY WITH 1/2 BANANA OR 8 OUNCE OF SUGAR FREE ORANGE JUICE, Disp: 90 tablet, Rfl: 3    lancets 30 gauge Misc, 1 lancet by Misc.(Non-Drug; Combo Route) route once daily., Disp: 100 each, Rfl: 4    lisinopril (PRINIVIL,ZESTRIL) 20 MG tablet, Take 1 tablet (20 mg total) by mouth once daily., Disp: 90 tablet, Rfl: 3    pravastatin (PRAVACHOL) 40 MG tablet, TAKE 2 TABLETS ONE TIME DAILY, Disp: 180 tablet, Rfl: 3    tramadol (ULTRAM) 50 mg tablet, TK 1 T PO  Q 8 H PRN P, Disp: , Rfl: 1  No current facility-administered medications for this visit.     Facility-Administered Medications Ordered in Other Visits:     0.9%  NaCl infusion, , Intravenous, Continuous, Daniel Wray MD, Stopped at 10/23/18 0843    0.9%  NaCl infusion, , Intravenous, Continuous, Daniel Wray MD    cyclopentolate 1% ophthalmic solution 1 drop, 1 drop, Left Eye, On Call Procedure, Daniel Wray MD, 1 drop at  "10/23/18 0648    phenylephrine HCL 2.5% ophthalmic solution 1 drop, 1 drop, Right Eye, On Call Procedure, Daniel Wray MD, 1 drop at 10/09/18 0808    phenylephrine HCL 2.5% ophthalmic solution 1 drop, 1 drop, Left Eye, On Call Procedure, Daniel Wray MD, 1 drop at 10/23/18 0648    proparacaine 0.5 % ophthalmic solution 1 drop, 1 drop, Right Eye, On Call Procedure, Daniel Wray MD, 1 drop at 10/09/18 0749    proparacaine 0.5 % ophthalmic solution 1 drop, 1 drop, Left Eye, On Call Procedure, Daniel Wray MD, 1 drop at 10/23/18 0636    tropicamide 1% ophthalmic solution 1 drop, 1 drop, Right Eye, On Call Procedure, Daniel Wray MD, 1 drop at 10/09/18 0808    tropicamide 1% ophthalmic solution 1 drop, 1 drop, Left Eye, On Call Procedure, Daniel Wray MD, 1 drop at 10/23/18 0648  ALLERGIES: Review of patient's allergies indicates:  No Known Allergies    Objective:     VITAL SIGNS: BP 96/60   Ht 5' 4" (1.626 m)   Wt 125.1 kg (275 lb 12.7 oz)   BMI 47.34 kg/m²    General    Nursing note and vitals reviewed.  Constitutional: She is oriented to person, place, and time. She appears well-developed and well-nourished.   HENT:   Head: Normocephalic and atraumatic.   no nasal discharge, no external ear redness or discharge   Eyes:   EOM is full and smooth  No eye redness or discharge   Neck: Neck supple. No tracheal deviation present.   Cardiovascular: Normal rate.    2+ Radial pulse bilaterally  2+ Dorsalis Pedis pulse bilaterally  No LE edema appreciated   Pulmonary/Chest: Effort normal. No respiratory distress.   Abdominal: She exhibits no distension.   No rigidity   Neurological: She is alert and oriented to person, place, and time. She exhibits normal muscle tone. Coordination normal.   See details below   Psychiatric: She has a normal mood and affect. Her behavior is normal.               MUSCULOSKELETAL EXAM    BILATERAL KNEE EXAMINATION   Affected side " is compared to contralateral knee     Observation:  No erythema, ecchymosis, or effusion noted.   + bony edema bilaterally  No muscle atrophy of the thighs and calves noted.  No obvious bony deformities noted.   + Genu valgus noted bilaterally.  No recurvatum noted.    No tibial internal/external torsion.    Posture:  Posterior pelvis tilt with loss of lumbar lordosis  Gait: Left antalgic with Over pronation ankle mechanics and Pes planus     Tenderness:  Patella - none    Lateral joint line - none  Quad tendon - none   Medial joint line - none  Patellar tendon - none   Medial plica - none  Tibial tubercle - none   Lateral plica - none  Pes anserine - none   MCL prox - none  Distal ITB - none   MCL distal - none  MFC - none    LCL prox - none  LFC - none    LCL distal - none  Tibia - none    Fibula - none    No obvious bursae, plicae, popliteal cysts, or tendon derangement palpated.          ROM (* = with pain):   Active extension to 0° on left without hyperextension, lag,  or patellar J sign. + crepitus  Active extension to 0° on right without hyperextension, lag,  or patellar J sign.  + crepitus   Active flexion to 135° on left and 135° on right    Strength(* = with pain):  Knee Flexion - 5/5 on left and 5/5 on right  Knee Extension - 5/5 on left and 5/5 on right  Hip Flexion - 5/5 on left and 5/5 on right  Hip Extension - 5/5 on left and 5/5 on right  Ankle dorsiflexion - 5/5 on left and 5/5 on right  Ankle Plantarflexion - 5/5 on left and 5/5 on right    Patellofemoral Exam:   Patellar ballottement - negative  Bulge sign - negative  Patellar grind - negative    No patellar laxity with medial and lateral translation   No apprehension with medial and lateral patellar translation.     Meniscus Testing:     No pain with terminal extension and flexion at joint lines.  Marcelas test - negative     Ligament Testing:  Lachman's test - negative  No laxity with anterior drawer.  No laxity with posterior drawer.    No  posterior sag sign.   No laxity with varus testing at 0 and 30 degrees.  No laxity with valgus testing at 0 and 30 degrees.    IT band testing:  Noble Compression test - negative    Neurovascular Examination:   Sensation intact to light touch in the obturator, lateral/intermediate/medial/posterior femoral cutaneous, saphenous, and common peroneal nerves bilaterally.  Motor Function:    Fully intact motor function at hip, knee, foot and ankle.  DTRs: 2+/4 reflexes at L4 and S1 dermatomes. No clonus. Downgoing Babinski.   Negative seated straight leg raise bilaterally.    Pulses intact at the DP and PT arteries bilaterally.    Capillary refill intact <2 seconds in all toes bilaterally.    IMAGIN. X-ray ordered due to left  knee pain. (AP bilateral standing, PA bilateral standing in flexion, bilateral merchants, and  left lateral views) taken today.   2. X-ray images were reviewed personally by me and then directly with patient.  3. FINDINGS: X-ray images obtained demonstrate severe DJD of both knees, mostly isolated to the lateral tibiofemoral compartments.  No fracture dislocation bone destruction seen.  No OCD seen.  + genu valgus  4. IMPRESSION: Kellgren-Jeremiah grade 4 OA bilaterally, mostly isolated to the lateral tibiofemoral compartments with associated genu valgus.       Assessment:      Encounter Diagnoses   Name Primary?    Primary osteoarthritis of left knee Yes    Chronic pain of left knee     Primary osteoarthritis of right knee     Pes planus of both feet     BMI 45.0-49.9, adult           Plan:     1. Bilateral knee pain secondary to severe DJD changes as noted on x-ray, left worse than right.  Bilateral pes planus likely contributing to biomechanical stress of the knees as well. No significant joint effusion appreciated on physical exam today.  - Discussed conservative therapy of OA with HEP, injection therapy (corticosteroid, viscosupplementation, and PRP), Ice up to 20 minutes at a  time, and OTC Tylenol for breakthrough pain vs. Referral to orthopedic surgery for discussion on TKA/parital knee arthroplasty. Pt. Would like to continue with conservative treatment at this time with starting visco supplementation injection series for the left knee.    - HEP given: Bilateral seated quadriceps strengthening exercise: Straight leg raises with hip neural, hip externally rotated, and then hip internally rotated. 10-15 reps in each plane, twice daily.  - OTC medial arch support recommended for bilateral pes planus.  - Recommend Glucosamine Condroitin supplement daily x 1 month, if improving symptoms then continue supplement    -  X-ray images of bilateral knees taken today (AP bilateral standing, PA bilateral standing in flexion, bilateral merchants, and  bilateral lateral views) showed Kellgren-Jeremiah grade 4 OA bilaterally, mostly isolated to the lateral tibiofemoral compartments with associated genu valgus. Images were personally reviewed with patient.    2.  BMI 47.34:  Discussed options low load aerobic exercise such as swimming, pool aerobics, biking and elliptical as well as a balanced diet to help with weight loss. Pt. Noted that she will restart her water aerobic routine.     3. Follow-up in 2 weeks for start of Orthovisc injection series for the left knee.     4. Patient agreeable to today's plan and all questions were answered.     This note is dictated using the M*Modal Fluency Direct word recognition program. There are word recognition mistakes that are occasionally missed on review.

## 2025-07-25 NOTE — H&P
Hospital Medicine History & Physical Note    Date of Service  7/24/2025    Primary Care Physician  Pcp Pt States None      Code Status  Full Code    Chief Complaint  Chief Complaint   Patient presents with    Blood in Urine     Pt was sent for protein in her urine that was see today at           History of Presenting Illness  Lissette Lee is a 40 y.o. female with history of hypertension, endometriosis, status post hysterectomy, solitary kidney  (?)  who presented 7/24/2025 with complaints of severe headache on and off over the last month, associated with nausea, hematuria with blood clots in the last 2 weeks.  She was prescribed nifedipine in the past, but was not taking it.  Occasionally she has dyspnea on exertion, denies chest pain or lower extremity edema.  She denies dysuria.  She has mild left CVA discomfort  She was seen at outpatient clinic where she noted to have blood pressure 240/170 and was referred to ER  In ER she continued being hypertensive after 10 mg of hydralazine IV  UA is red, cloudy with protein 300, moderate leukocyte esterase, large occult blood, moderate bacteria, WBC 11-20  CHEM panel showed creatinine 2.77, BUN 33, potassium 3.5.  Troponin 55, proBNP 34,514.  CBC showed mild anemia with hemoglobin 11, MCV 69, platelets count 113.  CT abdomen pelvis with contrast: Hazy right perinephric fat stranding, consider pyelonephritis.  Hepatomegaly with mild intrahepatic and extrahepatic biliary dilation.  Left kidney is not visualized.  CTA of the head with and without contrast: No acute abnormalities.  Chest x-ray: No acute abnormalities  EKG: Sinus tachycardia 98, LAFB, LVH, nonspecific T wave changes in lateral leads.  Patient was given potassium chloride, IV morphine 4 mg, Zofran, 1 L of Ringer lactate, IV hydralazine 10 mg, ceftriaxone 2 g IV, Tylenol 1 g IV  I discussed the plan of care with patient, bedside RN, and ERP.    Review of Systems  Review of Systems   Constitutional:   Negative for chills, fever and weight loss.   HENT:  Negative for ear pain, hearing loss and tinnitus.    Eyes:  Negative for blurred vision, double vision and photophobia.   Respiratory:  Negative for cough, hemoptysis and sputum production.    Cardiovascular:  Negative for chest pain, palpitations and orthopnea.   Gastrointestinal:  Positive for abdominal pain. Negative for heartburn, nausea and vomiting.   Genitourinary:  Positive for flank pain and hematuria. Negative for dysuria and frequency.   Musculoskeletal:  Negative for back pain, joint pain and neck pain.   Skin:  Negative for itching and rash.   Neurological:  Positive for headaches. Negative for tremors, speech change and focal weakness.   Endo/Heme/Allergies:  Negative for environmental allergies and polydipsia. Does not bruise/bleed easily.   Psychiatric/Behavioral:  Negative for hallucinations and substance abuse. The patient is not nervous/anxious.        Past Medical History   has a past medical history of ASTHMA, Hypertension, and Mass of ovary.    Surgical History   has a past surgical history that includes pr cystoscopy,insert ureteral stent (Bilateral, 10/13/2022); pr lap,appendectomy (10/13/2022); hysterectomy robotic xi (10/13/2022); and salpingo oophorectomy (Bilateral, 10/13/2022).     Family History  Family History   Problem Relation Age of Onset    Breast Cancer Sister     Cancer Maternal Grandmother         breast cancer         Family history reviewed with patient. There is no family history that is pertinent to the chief complaint.     Social History   reports that she has never smoked. She has never used smokeless tobacco. She reports current alcohol use. She reports that she does not currently use drugs after having used the following drugs: Inhaled.    Allergies  Allergies[1]    Medications  Prior to Admission Medications   Prescriptions Last Dose Informant Patient Reported? Taking?   NIFEdipine SR (PROCARDIA-XL) 30 MG tablet  Unknown Patient Yes No   Sig: Take 30 mg by mouth every day.   Patient not taking: Reported on 7/24/2025   asa/apap/caffeine (EXCEDRIN) 250-250-65 MG Tab 7/24/2025 Morning Patient Yes Yes   Sig: Take 2 Tablets by mouth one time as needed for Headache (pain).      Facility-Administered Medications: None       Physical Exam  Temp:  [36.6 °C (97.9 °F)-36.9 °C (98.4 °F)] 36.6 °C (97.9 °F)  Pulse:  [100-114] 113  Resp:  [10-19] 12  BP: (182-250)/(113-184) 194/120  SpO2:  [98 %-100 %] 98 %  Blood Pressure: (!) 194/120   Temperature: 36.6 °C (97.9 °F)   Pulse: (!) 113   Respiration: 12   Pulse Oximetry: 98 %       Physical Exam  Vitals and nursing note reviewed.   Constitutional:       General: She is not in acute distress.     Appearance: Normal appearance. She is ill-appearing.   HENT:      Head: Normocephalic and atraumatic.      Nose: Nose normal.      Mouth/Throat:      Mouth: Mucous membranes are moist.   Eyes:      Extraocular Movements: Extraocular movements intact.      Pupils: Pupils are equal, round, and reactive to light.   Cardiovascular:      Rate and Rhythm: Regular rhythm. Tachycardia present.   Pulmonary:      Effort: Pulmonary effort is normal.      Breath sounds: Normal breath sounds.   Abdominal:      General: Abdomen is flat. There is no distension.      Tenderness: There is no abdominal tenderness. There is right CVA tenderness (Mild).   Musculoskeletal:         General: No swelling or deformity. Normal range of motion.      Cervical back: Normal range of motion and neck supple.   Skin:     General: Skin is warm and dry.   Neurological:      General: No focal deficit present.      Mental Status: She is alert and oriented to person, place, and time.   Psychiatric:         Mood and Affect: Mood normal.         Behavior: Behavior normal.         Laboratory:  Recent Labs     07/24/25  1842   WBC 7.3   RBC 5.08   HEMOGLOBIN 11.0*   HEMATOCRIT 35.3*   MCV 69.5*   MCH 21.7*   MCHC 31.2*   RDW 50.6*    PLATELETCT 113*     Recent Labs     07/24/25  1842   SODIUM 140   POTASSIUM 3.5*   CHLORIDE 103   CO2 22   GLUCOSE 118*   BUN 33*   CREATININE 2.77*   CALCIUM 8.2*     Recent Labs     07/24/25 1842   ALTSGPT 22   ASTSGOT 31   ALKPHOSPHAT 93   TBILIRUBIN 0.3   GLUCOSE 118*     Recent Labs     07/24/25  1842   APTT 30.6   INR 0.92     Recent Labs     07/24/25  1842   NTPROBNP 76711*         Recent Labs     07/24/25 1842   TROPONINT 55*       Imaging:  CT-CTA HEAD WITH & W/O-POST PROCESS   Final Result         1.  No large vessel occlusion or aneurysm identified      CT-ABDOMEN-PELVIS WITH   Final Result         1.  Hazy right perinephric fat stranding, consider component of pyelonephritis.   2.  Hepatomegaly.   3.  Mild intrahepatic and extrahepatic biliary ductal dilatation, consider causes of biliary obstruction with additional workup as clinically appropriate.            DX-CHEST-PORTABLE (1 VIEW)   Final Result      No acute cardiopulmonary disease.      EC-ECHOCARDIOGRAM COMPLETE W/O CONT    (Results Pending)       X-Ray:  I have personally reviewed the images and compared with prior images.    Assessment/Plan:  Justification for Admission Status  I anticipate this patient will require at least two midnights for appropriate medical management, necessitating inpatient admission because acute kidney injury    Patient will need a Telemetry bed on MEDICAL service .  The need is secondary to elevated troponin, acute kidney injury.    * TACOS (acute kidney injury) (HCC)- (present on admission)  Assessment & Plan  40-year-old female with history of uncontrolled hypertension, reported solitary kidney, presented with complaints of headache ongoing of the last month, uncontrolled blood pressure, hematuria and increased creatinine 2.77, BUN 33.  Previously kidney function in 2022 was normal  CT of the abdomen pelvis with contrast showed possible inflammation of the right kidney, left kidney is not visualized.  No  hydronephrosis  UA showed proteinuria, hematuria, no casts  Concern for possible glomerulonephritis  Will order workup for glomerulonephritis (connective tissue panel, IgA, hepatitis, HIV, complement), urine spot protein/creatinine  Follow urine electrolytes  Consider nephrology consult and biopsy      Headache  Assessment & Plan  Probably secondary to uncontrolled hypertension.  CT a of the head with and without contrast showed no acute abnormalities  Neurochecks every 4 hours  Tylenol, oxycodone as needed  Blood pressure control    Hypertensive urgency  Assessment & Plan  Blood pressure at urgent care is as high as 240/170, improved after IV hydralazine  Will restart nifedipine, add hydralazine and clonidine p.o.  Continue IV hydralazine and labetalol for systolic blood pressure above 180  Monitor blood pressure every 4 hours    Elevated troponin  Assessment & Plan  Troponin 55, proBNP 34,514.  Suspected cardiomyopathy, perhaps hypertensive in etiology  Will repeat troponin, obtain transthoracic echo, check urine drug screen    Hematuria  Assessment & Plan  Hematuria with blood clots on and off in the last 2 weeks  Consider urology consult for cystoscopy    Pyelonephritis  Assessment & Plan  Reported mild right CVA tenderness.  UA positive for bacteria, leukocyte esterase, WBC elevation  CT of the abdomen showed possible pyelonephritis  Plan to treat with IV ceftriaxone  Follow urine culture    Hypokalemia  Assessment & Plan  Potassium 3.5.  Replacement ordered        VTE prophylaxis: SCDs/TEDs         [1] No Known Allergies

## 2025-07-25 NOTE — ASSESSMENT & PLAN NOTE
Blood pressure at urgent care is as high as 240/170, improved after IV hydralazine  -Continue clonidine 0.2 mg twice daily  - Continue hydralazine 75 mg every 8 hours  - Hold coreg due to bradycardia and sinus pause

## 2025-07-25 NOTE — ED NOTES
Medication history reviewed with patient at bedside.     Med rec is complete    Allergies reviewed.     Patient has not had any outpatient anti-MICROBIAL in the last 30 days.     Anticoagulants: No    Clair Lewis

## 2025-07-25 NOTE — ASSESSMENT & PLAN NOTE
Hematuria with blood clots on and off in the last 2 weeks  Urology consulted  Outpatient cystoscopy recommended

## 2025-07-25 NOTE — ASSESSMENT & PLAN NOTE
Troponin 55, proBNP 34,514.  Suspected cardiomyopathy, perhaps hypertensive in etiology  Recommend outpatient echocardiogram

## 2025-07-25 NOTE — PROGRESS NOTES
Notified on call provider of patient having blood pressures sustaining systolic  In the 200s despite PO and IV antihypertensives. Provider aware, expresses understanding.

## 2025-07-25 NOTE — CONSULTS
Urology Consultation    Date of Service  7/25/2025    Referring Physician  Dameon Hernandez D.O.    Consulting Physician  Meredith Limon P.A.-C.    Reason for Consultation  Hematuria    History of Presenting Illness  40 y.o. female with a solitary kidney who presented to the ED 7/24/2025, transferred from urgent care with hypertension and hematuria.  Urology consulted for hematuria recommendations and evaluation    Review of Systems  Review of Systems   Constitutional:  Negative for chills and fever.   HENT:  Negative for congestion and sore throat.    Respiratory:  Negative for cough and shortness of breath.    Cardiovascular:  Negative for chest pain, palpitations and leg swelling.   Gastrointestinal:  Negative for abdominal pain, diarrhea, nausea and vomiting.   Genitourinary:  Positive for hematuria. Negative for dysuria, flank pain, frequency and urgency.   Musculoskeletal:  Negative for back pain.   Skin:  Negative for itching and rash.   Neurological:  Positive for headaches. Negative for focal weakness and weakness.   Endo/Heme/Allergies:  Negative for polydipsia.       Past Medical History   has a past medical history of ASTHMA, Hypertension, and Mass of ovary.    Surgical History   has a past surgical history that includes pr cystoscopy,insert ureteral stent (Bilateral, 10/13/2022); pr lap,appendectomy (10/13/2022); hysterectomy robotic xi (10/13/2022); and salpingo oophorectomy (Bilateral, 10/13/2022).    Family History  Family History   Problem Relation Age of Onset    Breast Cancer Sister     Cancer Maternal Grandmother         breast cancer        Social History   reports that she has never smoked. She has never used smokeless tobacco. She reports current alcohol use. She reports that she does not currently use drugs after having used the following drugs: Inhaled.    Medications  Prior to Admission Medications   Prescriptions Last Dose Informant Patient Reported? Taking?   NIFEdipine SR  (PROCARDIA-XL) 30 MG tablet Unknown Patient Yes No   Sig: Take 30 mg by mouth every day.   Patient not taking: Reported on 7/24/2025   asa/apap/caffeine (EXCEDRIN) 250-250-65 MG Tab 7/24/2025 Morning Patient Yes Yes   Sig: Take 2 Tablets by mouth one time as needed for Headache (pain).      Facility-Administered Medications: None       Allergies  Allergies[1]    Physical Exam  Temp:  [36.4 °C (97.5 °F)-36.6 °C (97.9 °F)] 36.4 °C (97.5 °F)  Pulse:  [100-119] 106  Resp:  [10-27] 20  BP: (164-246)/(113-184) 169/120  SpO2:  [91 %-99 %] 91 %    Physical Exam  Constitutional:       Appearance: Normal appearance.      Comments: Patient is sleeping on gurney but arousable by voice   HENT:      Head: Normocephalic and atraumatic.      Nose: Nose normal.      Mouth/Throat:      Mouth: Mucous membranes are moist.      Pharynx: Oropharynx is clear.   Eyes:      Conjunctiva/sclera: Conjunctivae normal.      Pupils: Pupils are equal, round, and reactive to light.   Cardiovascular:      Rate and Rhythm: Normal rate and regular rhythm.      Heart sounds: Normal heart sounds.   Pulmonary:      Effort: Pulmonary effort is normal.      Breath sounds: Normal breath sounds.   Abdominal:      Palpations: There is no mass.      Tenderness: There is no abdominal tenderness. There is no right CVA tenderness, left CVA tenderness, guarding or rebound.   Musculoskeletal:         General: Normal range of motion.      Cervical back: Normal range of motion and neck supple.      Right lower leg: No edema.      Left lower leg: No edema.   Skin:     Capillary Refill: Capillary refill takes less than 2 seconds.      Findings: No rash.   Neurological:      General: No focal deficit present.      Mental Status: She is oriented to person, place, and time.         Fluids  Date 07/25/25 0700 - 07/26/25 0659   Shift 4850-8057 0560-4567 1786-8833 24 Hour Total   INTAKE   P.O. 0   0   Shift Total 0   0   OUTPUT   Urine 300   300   Shift Total 300   300    Weight (kg) 58.2 58.2 58.2 58.2       Laboratory  Recent Labs     07/24/25 1842 07/24/25 2326   WBC 7.3 8.7   RBC 5.08 5.24   HEMOGLOBIN 11.0* 11.5*   HEMATOCRIT 35.3* 37.1   MCV 69.5* 70.8*   MCH 21.7* 21.9*   MCHC 31.2* 31.0*   RDW 50.6* 52.2*   PLATELETCT 113* 143*     Recent Labs     07/24/25 1842 07/24/25 2326   SODIUM 140 135   POTASSIUM 3.5* 3.5*   CHLORIDE 103 99   CO2 22 20   GLUCOSE 118* 124*   BUN 33* 31*   CREATININE 2.77* 2.77*   CALCIUM 8.2* 8.6     Recent Labs     07/24/25 1842   APTT 30.6   INR 0.92                 Imaging  CT-CTA HEAD WITH & W/O-POST PROCESS   Final Result         1.  No large vessel occlusion or aneurysm identified      CT-ABDOMEN-PELVIS WITH   Final Result         1.  Hazy right perinephric fat stranding, consider component of pyelonephritis.   2.  Hepatomegaly.   3.  Mild intrahepatic and extrahepatic biliary ductal dilatation, consider causes of biliary obstruction with additional workup as clinically appropriate.            DX-CHEST-PORTABLE (1 VIEW)   Final Result      No acute cardiopulmonary disease.      EC-ECHOCARDIOGRAM COMPLETE W/O CONT    (Results Pending)       Assessment/Plan  40 y.o. female with a solitary kidney who presented to the ED 7/24/2025, transferred from urgent care with hypertension and hematuria.  Urology consulted for hematuria recommendations and evaluation    - Urinalysis shows proteinuria, hematuria, with micro showing 11-20 WBCs, greater than 100 RBCs and moderate bacteria.    - CT imaging shows hazy right perinephric fat stranding, concern for pyelonephritis  -Recommend Rocephin  -Plan to trend urine culture  -Outpatient cystoscopy/CT urogram but will need to obtain UA prior to this as hematuria could be normal in the setting of pyelonephritis, urology to schedule outpatient follow-up in 4 weeks  -Nephrology consulting  -Appreciate hospitalist team management    No new Assessment & Plan notes have been filed under this hospital service since  the last note was generated.  Service: Urology      Consults         [1] No Known Allergies

## 2025-07-25 NOTE — CONSULTS
DATE OF SERVICE:  07/25/2025     REQUESTING PHYSICIAN:  Dameon Hernandez MD     REASON FOR CONSULTATION: Acute kidney injury and hematuria.     The patient seen and examined.  Medical records were reviewed.     HISTORY OF PRESENT ILLNESS:  The patient is a 40-year-old lady with a past   medical history significant for longstanding hypertension, history of   endometriosis, status post hysterectomy, history of single functioning kidney   as the left kidney is absent, presented to the hospital last night with gross   hematuria.  The patient had a CT scan, which showed right perinephric fat   stranding concerning for pyelonephritis, also was found to have hepatomegaly.    Also, initial labs showed the patient in acute kidney injury with a   creatinine at 2.77 yesterday and today her baseline from 10/2022 was 0.87.     The patient did receive IV contrast with the CT scan yesterday.  The patient   has no recent use of NSAIDs.     PAST MEDICAL HISTORY:  Significant for:  1.  Hypertension.  2.  Asthma.     ALLERGIES:  No known drug allergies.     SOCIAL HISTORY:  The patient has no smoking history.     FAMILY HISTORY:  No known renal disease.     MEDICATIONS:  Reviewed.     REVIEW OF SYSTEMS:  All systems were reviewed.  They were negative except   outlined in the history of present illness.     PHYSICAL EXAMINATION:  GENERAL:  The patient appears ill, but in no apparent distress.  VITAL SIGNS:  Showed blood pressure of 140/91, heart rate was 74, respiratory   rate was 16.  HEENT:  Normocephalic and atraumatic.  Sclerae anicteric.  Pupils reactive.    Nose normal.  Mucous membranes are moist.  NECK:  No lymphadenopathy.  No JVD.  No thyroid mass.  CHEST:  Normal.  LUNGS:  Clear to auscultation.  HEART:  S1 and S2.  ABDOMEN:  Soft and nontender.  No hepatosplenomegaly.  There is no inguinal   lymphadenopathy.  EXTREMITIES:  There is no lower extremity edema.  SKIN:  No skin rash.  NEUROLOGIC:  The patient is alert and  oriented x3.  MOOD:  The patient is anxious.     LABORATORY DATA:  Her recent labs from today were reviewed.     DIAGNOSTIC DATA:  The patient had an abdominal CT scan, which I reviewed the   images myself showed absent left kidney and right perinephric fat stranding.     ASSESSMENT:  1.  Acute kidney injury in the setting of single functioning kidney.  Again,   the last creatinine we have was in 2022.  I am not sure how acute versus   chronic her presentation.  The patient does have presentation suggestive of   pyelonephritis; however, she does not have the fever or the acute pain.  2.  Hematuria, the etiology is not very clear.  Urology has been consulted.  3.  Proteinuria.  I am not sure whether it is from the pyelonephritis or acute   glomerulonephritis.  4.  Uncontrolled hypertension.  5.  Hypokalemia.  6.  Anemia.     PLAN:  1.  There is no acute need for renal replacement therapy.  2.  Check microalbumin to creatinine ratio.  3. Check serology include an JOHN, ANCA, complements level, and anti-GBM.  4.  I will hold off on a kidney biopsy until serology are very suggestive of   autoimmune disease considering the patient has questionable pyelonephritis and   also she has single functioning kidney.  5.  Fluid resuscitation.  6.  Management of blood pressure.  We will start the patient on low-salt diet   and then if blood pressure is still elevated, we will add antihypertensive   medication.  7.  The patient already started on antibiotics per the primary team.  8.  Renal diet.  9.  Renal dose all medications.  10. Prognosis is guarded.  11.  Plan discussed in detail with Dr. Hernandez.        ______________________________  FADI NAJJAR, MD FN/MEHDI    DD:  07/25/2025 15:49  DT:  07/25/2025 16:27    Job#:  [id number]

## 2025-07-25 NOTE — ASSESSMENT & PLAN NOTE
Broad differential; currently including RPGN, contrast-induced nephropathy and hepatitis C induced nephropathy  - Infectious disease consulted; recommending outpatient treatment for hepatitis C  - Nephrology following  - Deferring kidney biopsy at this time given solitary kidney  - Avoid nephrotoxins; renally dose medications  - Strict I's and O's  - Repeat BMP in a.m.  - Cryoglobulin workup pending  - Continuous telemetry monitoring; monitoring for arrhythmia in the setting of TACOS and electrolyte abnormalities

## 2025-07-25 NOTE — ED NOTES
Bedside report received from off going RN/tech: Jyotsna CALDERA, assumed care of patient.  POC discussed with patient. Call light within reach, all needs addressed at this time.       Fall risk interventions in place: Patient's personal possessions are with in their safe reach, Place socks on patient, Place fall risk sign on patient's door, Keep floor surfaces clean and dry, and Accompanied to restroom (all applicable per [facility] Fall risk assessment)   Continuous monitoring: Cardiac Leads, Pulse Ox, or Blood Pressure  IVF/IV medications: Not Applicable   Oxygen: Room Air  Bedside sitter: Not Applicable   Isolation: Not Applicable

## 2025-07-25 NOTE — PROGRESS NOTES
Pt transferred to tele 7 via [employer]. On RA, A&O4, Bp 211/146 medicating per MAR. Placed on tele box, monitor room notified. All fall and safety precautions in place, call light in reach.

## 2025-07-25 NOTE — ED PROVIDER NOTES
ED Provider Note    CHIEF COMPLAINT  Chief Complaint   Patient presents with    Blood in Urine     Pt was sent for protein in her urine that was see today at           EXTERNAL RECORDS REVIEWED  Outpatient Notes earlier today with the patient was seen in the clinic, was having no pain headaches fevers but was hypertensive at 250/170 with a history of hypertension but was not on any medications and not taking anything and she had hematuria in the clinic.  History of prior cystoscopy, appendectomy, hysterectomy and oophorectomy.  At this time because of the hematuria and concern for endorgan dysfunction in the setting of hypertension was sent to the ER.    HPI/ROS  LIMITATION TO HISTORY   Select: : None  OUTSIDE HISTORIAN(S):  Family at bedside    Lissette Lee is a 40 y.o. female who presents emergency room after being directed to come here after having some hematuria and high blood pressure in the clinic.  The patient states that she has been having some intermittent headaches over the course of a month nothing has been debilitating and not surrounding episodes of trauma.  She has been told in the past that she had hypertension has not been compliant with medications recommended in the past.  She is unaware what medication she was supposed to be on.  She has had a prior hysterectomy, believes that she may only have 1 kidney but is unsure of this and has not seek any regular care.  She is having new developing passage of clots and possibly small amount of blood in her stool though she has not seen this lately and denies any abdominal pains, tearing back discomfort, chest discomfort or exertional chest pain and has had no syncope or shortness of breath.  She has had no neck swelling or irritations, no vision changes, weakness or instability with ambulation.  She does not have a history of migraines.  She is not on blood thinners.  At the outside clinic blood pressure was concerning the elevated, on my arrival  "she was somewhat anxious and tachycardic with no febrile illness and her blood pressure was 240/170.      PAST MEDICAL HISTORY   has a past medical history of ASTHMA, Hypertension, and Mass of ovary.    SURGICAL HISTORY   has a past surgical history that includes cystoscopy,insert ureteral stent (Bilateral, 10/13/2022); lap,appendectomy (10/13/2022); hysterectomy robotic xi (10/13/2022); and salpingo oophorectomy (Bilateral, 10/13/2022).    FAMILY HISTORY  Family History   Problem Relation Age of Onset    Breast Cancer Sister     Cancer Maternal Grandmother         breast cancer      SOCIAL HISTORY  Social History     Tobacco Use    Smoking status: Never    Smokeless tobacco: Never   Vaping Use    Vaping status: Never Used   Substance and Sexual Activity    Alcohol use: Yes     Comment: soc    Drug use: Not Currently     Types: Inhaled    Sexual activity: Yes     Partners: Male     CURRENT MEDICATIONS  Home Medications       Reviewed by Lea Anders R.N. (Registered Nurse) on 07/24/25 at 1705  Med List Status: Not Addressed     Medication Last Dose Status        Patient [place] Taking any Medications                         Audit from Redirected Encounters    **Home medications have not yet been reviewed for this encounter**       ALLERGIES  Allergies[1]    PHYSICAL EXAM  VITAL SIGNS: BP (!) 241/173   Pulse (!) 102   Temp 36.6 °C (97.9 °F) (Temporal)   Resp 18   Ht 1.651 m (5' 5\")   Wt 56.7 kg (125 lb)   LMP 09/13/2022 (Exact Date)   SpO2 99%   BMI 20.80 kg/m²    Genl: F sitting in gurney comfortably, speaking clearly, appears in no acute distress   Head: NC/AT   ENT: Mucous membranes moist, posterior pharynx clear, uvula midline, nares patent bilaterally   Eyes: Normal sclera, pupils equal round reactive to light  Neck: Supple, FROM, no LAD appreciated, no bruits  Pulmonary: Lungs are clear to auscultation bilaterally  Chest: No TTP  CV: tachycardic, no murmur appreciated, pulses 2+ in both upper and " lower extremities,  Abdomen: soft, NT/ND; no rebound/guarding, no masses palpated, no HSM   : no CVA or suprapubic tenderness   Musculoskeletal: Pain free ROM of the neck. Moving upper and lower extremities in spontaneous and coordinated fashion  Neuro: Mental Status: Speech fluent without errors. Follows all commands. No dysarthria or apraxia.  Cranial Nerves: Pupils equal round and reactive to light. Extraocular motion intact. Visual fields intact. No nystagmus. CN V1-V3 intact to light touch. No facial asymmetry. Hearing clinically intact bilaterally. Tongue protrusion midline. No uvular deviation. Normal shoulder shrug and head turn.  Motor:  RUE: 5/5 with hand , 5/5 with flexion at the elbow 5/5 with extension at the elbow  LUE: 5/5 with hand , 5/5 with flexion at the elbow 5/5 with extension at the elbow  RLE: 5/5 with leg raise, 5/5 with plantar flexion, 5/5 with dorsal flexion  LLE: 5/5 with leg raise, 5/5 with plantar flexion, 5/5 with dorsal flexion  Sensation to light touch intact throughout  Reflexes 2+ Patellar tendons  No ataxia noted  Rapidly alternating movements without difficulty    EKG/LABS  Results for orders placed or performed during the hospital encounter of 25   EKG   Result Value Ref Range    Report       [facility] Emergency Dept.    Test Date:  2025  Pt Name:    BRYNN CHINCHILLA                Department: ER  MRN:        [id number]                      Room:  Gender:     Female                       Technician: [id number]  :        [id number]                   Requested By:ER TRIAGE PROTOCOL  Order #:    [id number]                    Reading MD:    Measurements  Intervals                                Axis  Rate:       98                           P:          85  MD:         186                          QRS:        39  QRSD:       93                           T:          110  QT:         377  QTc:        482    Interpretive Statements  Sinus  rhythm  Biatrial enlargement  LVH with secondary repolarization abnormality  Compared to ECG 09/28/2022 14:54:01  Atrial abnormality now present  Early repolarization now present     I have independently interpreted this EKG    Labs Reviewed   CBC WITH DIFFERENTIAL - Abnormal; Notable for the following components:       Result Value    Hemoglobin 11.0 (*)     Hematocrit 35.3 (*)     MCV 69.5 (*)     MCH 21.7 (*)     MCHC 31.2 (*)     RDW 50.6 (*)     Platelet Count 113 (*)     All other components within normal limits   COMP METABOLIC PANEL - Abnormal; Notable for the following components:    Potassium 3.5 (*)     Glucose 118 (*)     Bun 33 (*)     Creatinine 2.77 (*)     Calcium 8.2 (*)     All other components within normal limits   TROPONIN - Abnormal; Notable for the following components:    Troponin T 55 (*)     All other components within normal limits   PROBRAIN NATRIURETIC PEPTIDE, NT - Abnormal; Notable for the following components:    NT-proBNP 02631 (*)     All other components within normal limits   URINALYSIS,CULTURE IF INDICATED - Abnormal; Notable for the following components:    Color Red (*)     Character Cloudy (*)     Protein 300 (*)     Leukocyte Esterase Moderate (*)     Occult Blood Large (*)     All other components within normal limits   ESTIMATED GFR - Abnormal; Notable for the following components:    GFR (CKD-EPI) 21 (*)     All other components within normal limits   URINE MICROSCOPIC (W/UA) - Abnormal; Notable for the following components:    WBC 11-20 (*)     RBC >100 (*)     Bacteria Moderate (*)     All other components within normal limits   CRP QUANTITIVE (NON-CARDIAC) - Abnormal; Notable for the following components:    Stat C-Reactive Protein 1.67 (*)     All other components within normal limits   CBC WITH DIFFERENTIAL - Abnormal; Notable for the following components:    Hemoglobin 11.5 (*)     MCV 70.8 (*)     MCH 21.9 (*)     MCHC 31.0 (*)     RDW 52.2 (*)     Platelet Count  143 (*)     Neutrophils-Polys 76.60 (*)     Lymphocytes 14.20 (*)     All other components within normal limits   APTT   PROTHROMBIN TIME   SED RATE   CREATINE KINASE   URIC ACID   TSH WITH REFLEX TO FT4   PHOSPHORUS   URINE CULTURE(NEW)   URINE SODIUM RANDOM   ANTISTREPTOLYSIN O   PROTEIN/CREAT RATIO URINE   CONNECTIVE TISSUE DISEASES PROFILE   HEPATITIS PANEL ACUTE(4 COMPONENTS)   HIV AG/AB COMBO ASSAY DIAGNOSTIC   COMPLEMENT TOTAL (CH50)   GBM AB,IGG BY IFA   IGA SERUM QUANT   COMP METABOLIC PANEL   URINE DRUG SCREEN   TROPONIN     RADIOLOGY/PROCEDURES   I have independently interpreted the diagnostic imaging associated with this visit and am waiting the final reading from the radiologist.   My preliminary interpretation is as follows: No evidence of acute large vessel occlusion or aneurysmal disease noted on the CTA of the head.  Chest x-ray shows no focal infiltrates or cardiomegaly.  Patient CT of the abdomen shows just a right-sided kidney with no visualization on the left, there is some perinephric fat stranding with some intrahepatic and extrahepatic biliary ductal dilation with no evidence of stone occlusion  Radiologist interpretation:  CT-CTA HEAD WITH & W/O-POST PROCESS   Final Result         1.  No large vessel occlusion or aneurysm identified      CT-ABDOMEN-PELVIS WITH   Final Result         1.  Hazy right perinephric fat stranding, consider component of pyelonephritis.   2.  Hepatomegaly.   3.  Mild intrahepatic and extrahepatic biliary ductal dilatation, consider causes of biliary obstruction with additional workup as clinically appropriate.            DX-CHEST-PORTABLE (1 VIEW)   Final Result      No acute cardiopulmonary disease.      EC-ECHOCARDIOGRAM COMPLETE W/O CONT    (Results Pending)       COURSE & MEDICAL DECISION MAKING    ASSESSMENT, COURSE AND PLAN  Care Narrative: Patient presents emergency room for symptoms as described above.  Patient presented for ongoing uncontrolled hypertension  and concern of hematuria.  I am immediately concerned about the headaches, the dramatic hypertension which is likely chronic in nature and will should be reduced in a very judicious fashion.  With a very small dose of hydralazine this is coming down and she crossed threshold below 190 systolic she began having some headaches.  She did not have any focal neurological deficits but no further blood pressure medications were needed.  Lab work and medical imaging was obtained to rule out endorgan dysfunction.  This includes chest x-ray, CT of the head with angiography and CT of the abdomen pelvis to rule out kidney injury and other acute vascular components and bowel etiology.  The patient had urinalysis that showed subtle bacterial presence, presence of gross hematuria.  CT scan was somewhat delayed and coming back however this showed right hazy perinephric fat stranding.  While this could be presents in the standing of acute infection it is very likely that this is representative of changes from chronic hypertension and parenchymal injury.  She will be treated empirically for possible contaminated urine sample in the setting of possible pyelonephritis and as she has 1 kidney this will necessitate admission.  Her lab work came back showing no leukocytosis, borderline anemia, microcytic changes and creatinine bump to 2.77 from baseline 0.8.  Very subtle hypokalemia, GFR decreased to 21 with a troponin bump to 71.  She has not had chest pain, no shortness of breath, she does intermittently use methamphetamine and this was identified on her urine drug screen.    Patient will necessitate further fluid resuscitation, blood pressure management and further hydration.  She was started on Rocephin for empiric antibiosis but does not have septic etiology or criteria met at this time.    I spoken with the hospitalist who will admit the patient in guarded condition.    Hydration: Based on the patient's presentation of Dehydration  and Tachycardia the patient was given IV fluids. IV Hydration was used because oral hydration was not adequate alone. Upon recheck following hydration, the patient was improving.    FINAL DIAGNOSIS  1. Hypertensive emergency    2. Gross hematuria    3. Frequent headaches    4. TACOS (acute kidney injury) (HCC)      Electronically signed by: Dioni Lopez M.D., 7/24/2025 6:00 PM       [1]   Allergies  Allergen Reactions    No Known Drug Allergy

## 2025-07-25 NOTE — CARE PLAN
The patient is Watcher - Medium risk of patient condition declining or worsening    Shift Goals  Clinical Goals: hemodynamic stability  Patient Goals: rest  Family Goals: berry    Progress made toward(s) clinical / shift goals:    Problem: Urinary Elimination:  Goal: Ability to achieve and maintain adequate renal perfusion and functioning will improve  Outcome: Progressing  Goal: Ability to achieve a balanced intake and output will improve  Outcome: Progressing     Problem: Knowledge Deficit:  Goal: Patient's knowledge of the prescribed therapeutic regimen will improve  Outcome: Progressing       Patient is not progressing towards the following goals:      Problem: Physical Regulation:  Goal: Diagnostic test results will improve  Outcome: Not Progressing

## 2025-07-25 NOTE — PROGRESS NOTES
Monitor Summary        Rhythm: ST: 101-116  Ectopy: R)PAC, R)PVC  Measurements: 14/07/28  ---12 hr Chart Review---

## 2025-07-26 ASSESSMENT — ENCOUNTER SYMPTOMS
FEVER: 0
BLURRED VISION: 0
PALPITATIONS: 0
VOMITING: 0
SHORTNESS OF BREATH: 0
NAUSEA: 0
DOUBLE VISION: 0
CHILLS: 0
HEADACHES: 1
NERVOUS/ANXIOUS: 1
COUGH: 0
FLANK PAIN: 1
BACK PAIN: 0

## 2025-07-26 ASSESSMENT — FIBROSIS 4 INDEX: FIB4 SCORE: 2

## 2025-07-26 ASSESSMENT — PAIN DESCRIPTION - PAIN TYPE: TYPE: ACUTE PAIN

## 2025-07-26 ASSESSMENT — LIFESTYLE VARIABLES: SUBSTANCE_ABUSE: 1

## 2025-07-26 NOTE — CARE PLAN
The patient is Stable - Low risk of patient condition declining or worsening    Shift Goals  Clinical Goals: BP management, VSS, Safety  Patient Goals: Rest  Family Goals: KEISHA    Progress made toward(s) clinical / shift goals:      Problem: Pain - Standard  Goal: Alleviation of pain or a reduction in pain to the patient’s comfort goal  Outcome: Progressing     Problem: Knowledge Deficit - Standard  Goal: Patient and family/care givers will demonstrate understanding of plan of care, disease process/condition, diagnostic tests and medications  Outcome: Progressing     Problem: Psychosocial Needs:  Goal: Ability to cope will improve  Outcome: Progressing     Problem: Urinary Elimination:  Goal: Ability to achieve and maintain adequate renal perfusion and functioning will improve  Outcome: Progressing  Goal: Ability to achieve a balanced intake and output will improve  Outcome: Progressing     Problem: Physical Regulation:  Goal: Diagnostic test results will improve  Outcome: Progressing     Problem: Knowledge Deficit:  Goal: Patient's knowledge of the prescribed therapeutic regimen will improve  Outcome: Progressing

## 2025-07-26 NOTE — PROGRESS NOTES
Monitor Summary  Rhythm: SR  Rate: 75-93  Ectopy: (R) PVC's  Measurements: .15/.07/.39

## 2025-07-26 NOTE — PROGRESS NOTES
Hospital Medicine Daily Progress Note    Date of Service  7/26/2025    Chief Complaint  Lissette Lee is a 40 y.o. female admitted 7/24/2025 with hematuria    Hospital Course  No notes on file    Interval Problem Update  Patient was seen and examined at bedside.  No acute events overnight. Patient is resting comfortably in bed and in no acute distress.     Consults placed to urology, nephrology  IV Rocephin  Outpatient cystoscopy recommended  Cr 3.57  JOHN, IgG and ANCA pending  Hepatitis C antibody positive    - HCV quant pending    I have discussed this patient's plan of care and discharge plan at IDT rounds today with Case Management, Nursing, Nursing leadership, and other members of the IDT team.    Consultants/Specialty  nephrology and urology    Code Status  Full Code    Disposition  The patient is not medically cleared for discharge to home or a post-acute facility.      I have placed the appropriate orders for post-discharge needs.    Review of Systems  Review of Systems   Constitutional:  Negative for chills and fever.   HENT:  Negative for congestion.    Eyes:  Negative for blurred vision and double vision.   Respiratory:  Negative for cough and shortness of breath.    Cardiovascular:  Negative for chest pain and palpitations.   Gastrointestinal:  Negative for nausea and vomiting.   Genitourinary:  Positive for flank pain and hematuria.   Musculoskeletal:  Negative for back pain.   Neurological:  Positive for headaches.   Psychiatric/Behavioral:  Positive for substance abuse.         Physical Exam  Temp:  [37 °C (98.6 °F)] 37 °C (98.6 °F)  Pulse:  [64-86] 64  Resp:  [15-18] 18  BP: (138-175)/() 167/116  SpO2:  [94 %-97 %] 97 %    Physical Exam  Constitutional:       General: She is not in acute distress.  HENT:      Head: Normocephalic.      Right Ear: External ear normal.      Left Ear: External ear normal.      Nose: No congestion.   Eyes:      General: No scleral icterus.  Cardiovascular:       Rate and Rhythm: Normal rate.   Pulmonary:      Breath sounds: No wheezing.   Abdominal:      Tenderness: There is no abdominal tenderness. There is no guarding or rebound.   Musculoskeletal:         General: No swelling.   Skin:     Coloration: Skin is not jaundiced.      Findings: No bruising.   Neurological:      General: No focal deficit present.      Mental Status: She is alert.      Motor: No weakness.   Psychiatric:         Mood and Affect: Mood normal.         Behavior: Behavior normal.         Fluids    Intake/Output Summary (Last 24 hours) at 7/26/2025 1107  Last data filed at 7/26/2025 0800  Gross per 24 hour   Intake 360 ml   Output 1000 ml   Net -640 ml        Laboratory  Recent Labs     07/24/25  1842 07/24/25 2326 07/26/25  0453   WBC 7.3 8.7 8.1   RBC 5.08 5.24 4.94   HEMOGLOBIN 11.0* 11.5* 10.9*   HEMATOCRIT 35.3* 37.1 34.5*   MCV 69.5* 70.8* 69.8*   MCH 21.7* 21.9* 22.1*   MCHC 31.2* 31.0* 31.6*   RDW 50.6* 52.2* 52.8*   PLATELETCT 113* 143* 155*     Recent Labs     07/24/25  1842 07/24/25 2326 07/26/25  0453   SODIUM 140 135 137   POTASSIUM 3.5* 3.5* 3.6   CHLORIDE 103 99 101   CO2 22 20 23   GLUCOSE 118* 124* 112*   BUN 33* 31* 35*   CREATININE 2.77* 2.77* 3.57*   CALCIUM 8.2* 8.6 8.4*     Recent Labs     07/24/25 1842   APTT 30.6   INR 0.92               Imaging  CT-CTA HEAD WITH & W/O-POST PROCESS   Final Result         1.  No large vessel occlusion or aneurysm identified      CT-ABDOMEN-PELVIS WITH   Final Result         1.  Hazy right perinephric fat stranding, consider component of pyelonephritis.   2.  Hepatomegaly.   3.  Mild intrahepatic and extrahepatic biliary ductal dilatation, consider causes of biliary obstruction with additional workup as clinically appropriate.            DX-CHEST-PORTABLE (1 VIEW)   Final Result      No acute cardiopulmonary disease.      EC-ECHOCARDIOGRAM COMPLETE W/O CONT    (Results Pending)        Assessment/Plan  * TACOS (acute kidney injury) (HCC)- (present  on admission)  Assessment & Plan  40-year-old female with history of uncontrolled hypertension, reported solitary kidney  CT of the abdomen pelvis with contrast showed possible inflammation of the right kidney, left kidney is not visualized.  No hydronephrosis  UA showed proteinuria, hematuria, no casts  Concern for possible glomerulonephritis    - JOHN, IgG and ANCA pending  Hepatitis C antibody positive    - HCV quant pending  Nephrology recommendations      Hematuria  Assessment & Plan  Hematuria with blood clots on and off in the last 2 weeks  Urology consulted  Outpatient cystoscopy recommended    Hypertensive urgency  Assessment & Plan  Blood pressure at urgent care is as high as 240/170, improved after IV hydralazine  Continue hydralazine and clonidine p.o.  Continue IV hydralazine and labetalol for systolic blood pressure above 180  Monitor blood pressure every 4 hours  Increasing dose of bisoprolol    Elevated troponin  Assessment & Plan  Troponin 55, proBNP 34,514.  Suspected cardiomyopathy, perhaps hypertensive in etiology  Likely sequelae of hypertensive urgency in setting of TACOS  Echo ordered    Pyelonephritis  Assessment & Plan  Reported mild right CVA tenderness.  UA positive for bacteria, leukocyte esterase, WBC elevation  CT of the abdomen showed possible pyelonephritis  Continue IV ceftriaxone  Urine culture pending    Amphetamine abuse (HCC)  Assessment & Plan  UDS positive amphetamines    Headache  Assessment & Plan  Probably secondary to uncontrolled hypertension.  CT a of the head with and without contrast showed no acute abnormalities  Neurochecks every 4 hours  Tylenol, oxycodone as needed  Blood pressure control    Hypokalemia  Assessment & Plan  monitor         VTE prophylaxis: VTE Selection    I have performed a physical exam and reviewed and updated ROS and Plan today (7/26/2025). In review of yesterday's note (7/25/2025), there are no changes except as documented above.    Greater than 52  minutes spent prepping to see patient (e.g. review of tests) obtaining and/or reviewing separately obtained history. Performing a medically appropriate examination and/ evaluation.  Counseling and educating the patient/family/caregiver.  Ordering medications, tests, or procedures.  Referring and communicating with other health care professionals.  Documenting clinical information in EPIC.  Independently interpreting results and communicating results to patient/family/caregiver.  Care coordination.

## 2025-07-26 NOTE — PROGRESS NOTES
Continue supportive care    No associated orders from this encounter found during lookback period of 72 hours.     Nephrology Daily Progress Note    Date of Service  7/26/2025    Chief Complaint  40 y.o. female admitted 7/24/2025 with hematuria, found to have TACOS    Interval Problem Update  Patient has no chest pain or shortness of breath    Review of Systems  Review of Systems   Constitutional:  Positive for malaise/fatigue. Negative for chills and fever.   Respiratory:  Negative for cough and shortness of breath.    Cardiovascular:  Negative for chest pain and leg swelling.   Gastrointestinal:  Negative for nausea and vomiting.   Genitourinary:  Negative for dysuria, frequency and urgency.   Psychiatric/Behavioral:  The patient is nervous/anxious.         Physical Exam  Temp:  [36.8 °C (98.3 °F)-37 °C (98.6 °F)] 36.8 °C (98.3 °F)  Pulse:  [64-86] 65  Resp:  [15-18] 17  BP: (138-175)/() 166/116  SpO2:  [94 %-97 %] 97 %    Physical Exam  Vitals and nursing note reviewed.   Constitutional:       General: She is awake. She is not in acute distress.     Appearance: She is well-developed. She is not ill-appearing or diaphoretic.   HENT:      Head: Normocephalic and atraumatic.      Right Ear: External ear normal.      Left Ear: External ear normal.      Nose: Nose normal. No rhinorrhea.      Mouth/Throat:      Pharynx: No oropharyngeal exudate or posterior oropharyngeal erythema.   Eyes:      General: No scleral icterus.        Right eye: No discharge.         Left eye: No discharge.      Conjunctiva/sclera: Conjunctivae normal.   Neck:      Vascular: No carotid bruit.   Cardiovascular:      Rate and Rhythm: Normal rate and regular rhythm.      Heart sounds: No murmur heard.  Pulmonary:      Effort: Pulmonary effort is normal. No respiratory distress.      Breath sounds: Normal breath sounds.   Abdominal:      General: Abdomen is flat. There is no distension.      Palpations: Abdomen is soft. There is no mass.   Musculoskeletal:         General: No tenderness.      Cervical back: No rigidity. No muscular tenderness.      Right  lower leg: No edema.      Left lower leg: No edema.   Skin:     General: Skin is warm and dry.      Coloration: Skin is not jaundiced.   Neurological:      General: No focal deficit present.      Mental Status: She is alert and oriented to person, place, and time. Mental status is at baseline.   Psychiatric:         Mood and Affect: Mood normal.         Behavior: Behavior normal.         Thought Content: Thought content normal.         Fluids    Intake/Output Summary (Last 24 hours) at 7/26/2025 1136  Last data filed at 7/26/2025 0800  Gross per 24 hour   Intake 360 ml   Output 1000 ml   Net -640 ml       Laboratory  Recent Labs     07/24/25  1842 07/24/25 2326 07/26/25  0453   WBC 7.3 8.7 8.1   RBC 5.08 5.24 4.94   HEMOGLOBIN 11.0* 11.5* 10.9*   HEMATOCRIT 35.3* 37.1 34.5*   MCV 69.5* 70.8* 69.8*   MCH 21.7* 21.9* 22.1*   MCHC 31.2* 31.0* 31.6*   RDW 50.6* 52.2* 52.8*   PLATELETCT 113* 143* 155*     Recent Labs     07/24/25  1842 07/24/25 2326 07/26/25 0453   SODIUM 140 135 137   POTASSIUM 3.5* 3.5* 3.6   CHLORIDE 103 99 101   CO2 22 20 23   GLUCOSE 118* 124* 112*   BUN 33* 31* 35*   CREATININE 2.77* 2.77* 3.57*   CALCIUM 8.2* 8.6 8.4*     Recent Labs     07/24/25 1842   APTT 30.6   INR 0.92     Recent Labs     07/24/25 1842   NTPROBNP 72152*           Imaging  CT-CTA HEAD WITH & W/O-POST PROCESS   Final Result         1.  No large vessel occlusion or aneurysm identified      CT-ABDOMEN-PELVIS WITH   Final Result         1.  Hazy right perinephric fat stranding, consider component of pyelonephritis.   2.  Hepatomegaly.   3.  Mild intrahepatic and extrahepatic biliary ductal dilatation, consider causes of biliary obstruction with additional workup as clinically appropriate.            DX-CHEST-PORTABLE (1 VIEW)   Final Result      No acute cardiopulmonary disease.      EC-ECHOCARDIOGRAM COMPLETE W/O CONT    (Results Pending)         Assessment/Plan  1 TACOS: Creatinine is worse today, etiology is not very clear,  possibilities include ATN, contrast-induced nephropathy versus acute GN  2 single functioning kidney  3 hematuria  4 proteinuria: Possibly secondary to pyelonephritis  5 pyelonephritis  6 anemia  no acute need for HD today, continue to evaluate daily  Check microalbumin to creatinine ratio  Awaiting serology workup  We will decide on the option of kidney biopsy after serologies back considering patient has single functioning kidney  Renal diet  Daily BMP, CBC.  Renal dose all meds  Avoid nephrotoxins like NSAIDs.  Prognosis guarded.  Plan discussed with Dr. Hernandez

## 2025-07-26 NOTE — CARE PLAN
The patient is Stable - Low risk of patient condition declining or worsening    Shift Goals  Clinical Goals: pain management, monitor BP, monitor kidney function  Patient Goals: Rest  Family Goals: KEISHA    Progress made toward(s) clinical / shift goals:    Problem: Pain - Standard  Goal: Alleviation of pain or a reduction in pain to the patient’s comfort goal  Outcome: Progressing     Problem: Knowledge Deficit - Standard  Goal: Patient and family/care givers will demonstrate understanding of plan of care, disease process/condition, diagnostic tests and medications  Outcome: Progressing     Problem: Urinary Elimination:  Goal: Ability to achieve and maintain adequate renal perfusion and functioning will improve  Outcome: Progressing  Goal: Ability to achieve a balanced intake and output will improve  Outcome: Progressing     Problem: Knowledge Deficit:  Goal: Patient's knowledge of the prescribed therapeutic regimen will improve  Outcome: Progressing     06:45-19:15  Patient A&Ox4. C/o h/a this morning; PRN pain medicine administered. Pending echo. Ambulating independently in room. Bed locked in lowest position. Call light within reach. Safety precautions maintained.

## 2025-07-27 PROBLEM — B17.10 ACUTE HEPATITIS C VIRUS INFECTION: Status: ACTIVE | Noted: 2025-07-27

## 2025-07-27 ASSESSMENT — ENCOUNTER SYMPTOMS
SHORTNESS OF BREATH: 0
CHILLS: 0
FLANK PAIN: 1
COUGH: 0
FEVER: 0
BLURRED VISION: 0
BACK PAIN: 0
VOMITING: 0
HEADACHES: 1
PALPITATIONS: 0
DOUBLE VISION: 0
NAUSEA: 0

## 2025-07-27 ASSESSMENT — PAIN DESCRIPTION - PAIN TYPE
TYPE: ACUTE PAIN

## 2025-07-27 ASSESSMENT — LIFESTYLE VARIABLES: SUBSTANCE_ABUSE: 1

## 2025-07-27 NOTE — PROGRESS NOTES
Hospital Medicine Daily Progress Note    Date of Service  7/27/2025    Chief Complaint  Lissette Lee is a 40 y.o. female admitted 7/24/2025 with hematuria    Hospital Course  No notes on file    Interval Problem Update  Patient was seen and examined at bedside.  No acute events overnight. Patient is resting comfortably in bed and in no acute distress.     Consults placed to urology, nephrology  Outpatient cystoscopy recommended  Cr 4.52  JOHN, IgG and ANCA pending  Hepatitis C antibody positive    - HCV quant positive     - discussed possibly starting therapy with ID  Urine culture - gardnerella vaginalis     - continue flagyl  NPO midnight for possibly renal biopsy 07/28    I have discussed this patient's plan of care and discharge plan at IDT rounds today with Case Management, Nursing, Nursing leadership, and other members of the IDT team.    Consultants/Specialty  nephrology and urology    Code Status  Full Code    Disposition  The patient is not medically cleared for discharge to home or a post-acute facility.      I have placed the appropriate orders for post-discharge needs.    Review of Systems  Review of Systems   Constitutional:  Negative for chills and fever.   HENT:  Negative for congestion.    Eyes:  Negative for blurred vision and double vision.   Respiratory:  Negative for cough and shortness of breath.    Cardiovascular:  Negative for chest pain and palpitations.   Gastrointestinal:  Negative for nausea and vomiting.   Genitourinary:  Positive for flank pain and hematuria.   Musculoskeletal:  Negative for back pain.   Neurological:  Positive for headaches.   Psychiatric/Behavioral:  Positive for substance abuse.         Physical Exam  Temp:  [36.1 °C (97 °F)-36.7 °C (98 °F)] 36.7 °C (98 °F)  Pulse:  [59-88] 59  Resp:  [16-19] 16  BP: (123-200)/() 123/84  SpO2:  [95 %-97 %] 97 %    Physical Exam  Constitutional:       General: She is not in acute distress.  HENT:      Head: Normocephalic.       Right Ear: External ear normal.      Left Ear: External ear normal.      Nose: No congestion.   Eyes:      General: No scleral icterus.  Cardiovascular:      Rate and Rhythm: Normal rate.   Pulmonary:      Breath sounds: No wheezing.   Abdominal:      Tenderness: There is no abdominal tenderness. There is no guarding or rebound.   Musculoskeletal:         General: No swelling.   Skin:     Coloration: Skin is not jaundiced.      Findings: No bruising.   Neurological:      General: No focal deficit present.      Mental Status: She is alert.      Motor: No weakness.   Psychiatric:         Mood and Affect: Mood normal.         Behavior: Behavior normal.         Fluids    Intake/Output Summary (Last 24 hours) at 7/27/2025 1412  Last data filed at 7/27/2025 1025  Gross per 24 hour   Intake 350 ml   Output 350 ml   Net 0 ml        Laboratory  Recent Labs     07/24/25 2326 07/26/25 0453 07/27/25  0108   WBC 8.7 8.1 6.4   RBC 5.24 4.94 4.69   HEMOGLOBIN 11.5* 10.9* 10.4*   HEMATOCRIT 37.1 34.5* 33.6*   MCV 70.8* 69.8* 71.6*   MCH 21.9* 22.1* 22.2*   MCHC 31.0* 31.6* 31.0*   RDW 52.2* 52.8* 54.3*   PLATELETCT 143* 155* 135*     Recent Labs     07/24/25 2326 07/26/25 0453 07/27/25  0108   SODIUM 135 137 139   POTASSIUM 3.5* 3.6 3.8   CHLORIDE 99 101 101   CO2 20 23 25   GLUCOSE 124* 112* 107*   BUN 31* 35* 45*   CREATININE 2.77* 3.57* 4.52*   CALCIUM 8.6 8.4* 8.4*     Recent Labs     07/24/25  1842   APTT 30.6   INR 0.92               Imaging  CT-CTA HEAD WITH & W/O-POST PROCESS   Final Result         1.  No large vessel occlusion or aneurysm identified      CT-ABDOMEN-PELVIS WITH   Final Result         1.  Hazy right perinephric fat stranding, consider component of pyelonephritis.   2.  Hepatomegaly.   3.  Mild intrahepatic and extrahepatic biliary ductal dilatation, consider causes of biliary obstruction with additional workup as clinically appropriate.            DX-CHEST-PORTABLE (1 VIEW)   Final Result      No acute  cardiopulmonary disease.      EC-ECHOCARDIOGRAM COMPLETE W/O CONT    (Results Pending)        Assessment/Plan  * TACOS (acute kidney injury) (HCC)- (present on admission)  Assessment & Plan  40-year-old female with history of uncontrolled hypertension, reported solitary kidney  CT of the abdomen pelvis with contrast showed possible inflammation of the right kidney, left kidney is not visualized.  No hydronephrosis  UA showed proteinuria, hematuria, no casts  Concern for possible glomerulonephritis    - JOHN, IgG and ANCA pending  Acute hepatitis C positive  Nephrology recommendations  Cr 4.52  NPO midnight for possibly renal biopsy 07/28    Acute hepatitis C virus infection  Assessment & Plan  Hepatitis C antibody positive    - HCV quant positive     - discussed possibly starting therapy with ID    Hematuria  Assessment & Plan  Hematuria with blood clots on and off in the last 2 weeks  Urology consulted  Outpatient cystoscopy recommended    Hypertensive urgency  Assessment & Plan  Blood pressure at urgent care is as high as 240/170, improved after IV hydralazine  Continue hydralazine and clonidine p.o.  Continue IV hydralazine and labetalol for systolic blood pressure above 180  Monitor blood pressure every 4 hours  Increasing dose of bisoprolol    Elevated troponin  Assessment & Plan  Troponin 55, proBNP 34,514.  Suspected cardiomyopathy, perhaps hypertensive in etiology  Likely sequelae of hypertensive urgency in setting of TACOS  Echo ordered    Pyelonephritis  Assessment & Plan  Reported mild right CVA tenderness.  UA positive for bacteria, leukocyte esterase, WBC elevation  CT of the abdomen showed possible pyelonephritis  Urine culture - gardnerella vaginalis     - continue flagyl    Amphetamine abuse (HCC)  Assessment & Plan  UDS positive amphetamines    Headache  Assessment & Plan  Probably secondary to uncontrolled hypertension.  CT a of the head with and without contrast showed no acute  abnormalities  Neurochecks every 4 hours  Tylenol, oxycodone as needed  Blood pressure control    Hypokalemia  Assessment & Plan  monitor         VTE prophylaxis: VTE Selection    I have performed a physical exam and reviewed and updated ROS and Plan today (7/27/2025). In review of yesterday's note (7/26/2025), there are no changes except as documented above.    Greater than 52 minutes spent prepping to see patient (e.g. review of tests) obtaining and/or reviewing separately obtained history. Performing a medically appropriate examination and/ evaluation.  Counseling and educating the patient/family/caregiver.  Ordering medications, tests, or procedures.  Referring and communicating with other health care professionals.  Documenting clinical information in EPIC.  Independently interpreting results and communicating results to patient/family/caregiver.  Care coordination.

## 2025-07-27 NOTE — PROGRESS NOTES
Monitor Summary  Rhythm: SB/SR  Rate: 56-78  Ectopy: (R) PVC's  Measurements: .14/.08/.33

## 2025-07-27 NOTE — CARE PLAN
The patient is Stable - Low risk of patient condition declining or worsening    Shift Goals  Clinical Goals: monitor BP, monitor kidney function  Patient Goals: Rest  Family Goals: Updates    Progress made toward(s) clinical / shift goals:    Problem: Pain - Standard  Goal: Alleviation of pain or a reduction in pain to the patient’s comfort goal  Outcome: Progressing     Problem: Knowledge Deficit - Standard  Goal: Patient and family/care givers will demonstrate understanding of plan of care, disease process/condition, diagnostic tests and medications  Outcome: Progressing       Patient is not progressing towards the following goals:    Problem: Urinary Elimination:  Goal: Ability to achieve and maintain adequate renal perfusion and functioning will improve  Outcome: Not Progressing    06:45-19:15  Patient A&Ox4. Denies pain this morning. Ambulating independently in room. Kidney function worse this morning; MD made aware. Nephrology following patient. Bed locked in lowest position. Call light within reach. Safety precautions maintained.    10:25 - post void residual 0cc. MD made aware.

## 2025-07-27 NOTE — PROGRESS NOTES
Surgical Progress Note    Author: Savita Olivares M.D. Date & Time created: 2025   5:05 PM     Interval Events:  Patient reports spring color of urine, overall feeling better.     Review of Systems   All other systems reviewed and are negative.    Hemodynamics:  Temp (24hrs), Av.9 °C (98.5 °F), Min:36.8 °C (98.3 °F), Max:37 °C (98.6 °F)  Temperature: 36.8 °C (98.3 °F)  Pulse  Av.4  Min: 64  Max: 119   Blood Pressure: (!) 141/88          Fluids:    Intake/Output Summary (Last 24 hours) at 2025 1705  Last data filed at 2025 0800  Gross per 24 hour   Intake 120 ml   Output 1000 ml   Net -880 ml     Weight: 57.2 kg (126 lb 1.7 oz)  Current Diet Order   Procedures    Diet Order Diet: Renal     Physical Exam  Constitutional:       Appearance: Normal appearance.   HENT:      Head: Normocephalic and atraumatic.      Mouth/Throat:      Mouth: Mucous membranes are moist.   Pulmonary:      Effort: Pulmonary effort is normal.   Skin:     General: Skin is warm.   Neurological:      General: No focal deficit present.      Mental Status: She is alert.   Psychiatric:         Mood and Affect: Mood normal.         Behavior: Behavior normal.       Labs:  Recent Results (from the past 24 hours)   COMPLEMENT C3    Collection Time: 25  5:42 PM   Result Value Ref Range    C3 Complement 124.0 87.0 - 200.0 mg/dL   COMPLEMENT C4    Collection Time: 25  5:42 PM   Result Value Ref Range    Complement C4 14.0 (L) 19.0 - 52.0 mg/dL   MAGNESIUM    Collection Time: 25  4:53 AM   Result Value Ref Range    Magnesium 2.2 1.5 - 2.5 mg/dL   Renal Function Panel    Collection Time: 25  4:53 AM   Result Value Ref Range    Sodium 137 135 - 145 mmol/L    Potassium 3.6 3.6 - 5.5 mmol/L    Chloride 101 96 - 112 mmol/L    Co2 23 20 - 33 mmol/L    Anion Gap 13.0 7.0 - 16.0    Glucose 112 (H) 65 - 99 mg/dL    Creatinine 3.57 (H) 0.50 - 1.40 mg/dL    Bun 35 (H) 8 - 22 mg/dL    Calcium 8.4 (L) 8.5 - 10.5 mg/dL     Correct Calcium 9.0 8.5 - 10.5 mg/dL    Phosphorus 5.5 (H) 2.5 - 4.5 mg/dL    Albumin 3.3 3.2 - 4.9 g/dL   CBC WITHOUT DIFFERENTIAL    Collection Time: 07/26/25  4:53 AM   Result Value Ref Range    WBC 8.1 4.8 - 10.8 K/uL    RBC 4.94 4.20 - 5.40 M/uL    Hemoglobin 10.9 (L) 12.0 - 16.0 g/dL    Hematocrit 34.5 (L) 37.0 - 47.0 %    MCV 69.8 (L) 81.4 - 97.8 fL    MCH 22.1 (L) 27.0 - 33.0 pg    MCHC 31.6 (L) 32.2 - 35.5 g/dL    RDW 52.8 (H) 35.9 - 50.0 fL    Platelet Count 155 (L) 164 - 446 K/uL   ESTIMATED GFR    Collection Time: 07/26/25  4:53 AM   Result Value Ref Range    GFR (CKD-EPI) 16 (A) >60 mL/min/1.73 m 2     Medical Decision Making, by Problem:  Active Hospital Problems    Diagnosis     Hypokalemia [E87.6]     Pyelonephritis [N12]     Hematuria [R31.9]     Elevated troponin [R79.89]     Hypertensive urgency [I16.0]     Headache [R51.9]     Amphetamine abuse (HCC) [F15.10]     TACOS (acute kidney injury) (HCC) [N17.9]      Plan:  Gross hematuria, pyelonephritis, TACOS  -hematuria resolving, discussed spring/dark color of urine typically indicates old blood/no new active bleeding  -will need 2 weeks of antibiotics for pyelonephritis  -we will repeat a UA as an outpatient in 4 weeks, if no hematuria no further workup indicated as hematuria in the setting of active infection is not concerning for malignancy  -no obstruction, no indication for surgical intervention  -urology to sign off, please call with additional questions, concerns    Quality Measures:  Quality-Core Measures    Discussed patient condition with Patient

## 2025-07-27 NOTE — PROGRESS NOTES
Nephrology Daily Progress Note    Date of Service  7/27/2025    Chief Complaint  40 y.o. female admitted 7/24/2025 with hematuria, found to have TACOS    Interval Problem Update  Patient has no chest pain or shortness of breath  7/27 patient has no new complaints  Review of Systems  Review of Systems   Constitutional:  Negative for chills, fever and malaise/fatigue.   Respiratory:  Negative for cough and shortness of breath.    Cardiovascular:  Negative for chest pain and leg swelling.   Gastrointestinal:  Negative for nausea and vomiting.   Genitourinary:  Negative for dysuria, frequency and urgency.        Physical Exam  Temp:  [36.1 °C (97 °F)-36.7 °C (98 °F)] 36.7 °C (98 °F)  Pulse:  [61-88] 61  Resp:  [17-19] 17  BP: (141-200)/() 165/110  SpO2:  [95 %-97 %] 96 %    Physical Exam  Vitals and nursing note reviewed.   Constitutional:       General: She is awake. She is not in acute distress.     Appearance: She is well-developed. She is not ill-appearing or diaphoretic.   HENT:      Head: Normocephalic and atraumatic.      Right Ear: External ear normal.      Left Ear: External ear normal.      Nose: Nose normal. No rhinorrhea.      Mouth/Throat:      Pharynx: No oropharyngeal exudate or posterior oropharyngeal erythema.   Eyes:      General: No scleral icterus.        Right eye: No discharge.         Left eye: No discharge.      Conjunctiva/sclera: Conjunctivae normal.   Neck:      Vascular: No carotid bruit.   Cardiovascular:      Rate and Rhythm: Normal rate and regular rhythm.      Heart sounds: No murmur heard.  Pulmonary:      Effort: Pulmonary effort is normal. No respiratory distress.      Breath sounds: Normal breath sounds.   Abdominal:      General: Abdomen is flat. There is no distension.      Palpations: Abdomen is soft. There is no mass.   Musculoskeletal:         General: No tenderness.      Cervical back: No rigidity. No muscular tenderness.      Right lower leg: No edema.      Left lower leg:  No edema.   Skin:     General: Skin is warm and dry.      Coloration: Skin is not jaundiced.   Neurological:      General: No focal deficit present.      Mental Status: She is alert and oriented to person, place, and time. Mental status is at baseline.   Psychiatric:         Mood and Affect: Mood normal.         Behavior: Behavior normal.         Thought Content: Thought content normal.         Fluids    Intake/Output Summary (Last 24 hours) at 7/27/2025 1153  Last data filed at 7/27/2025 1025  Gross per 24 hour   Intake 350 ml   Output 350 ml   Net 0 ml       Laboratory  Recent Labs     07/24/25 2326 07/26/25  0453 07/27/25  0108   WBC 8.7 8.1 6.4   RBC 5.24 4.94 4.69   HEMOGLOBIN 11.5* 10.9* 10.4*   HEMATOCRIT 37.1 34.5* 33.6*   MCV 70.8* 69.8* 71.6*   MCH 21.9* 22.1* 22.2*   MCHC 31.0* 31.6* 31.0*   RDW 52.2* 52.8* 54.3*   PLATELETCT 143* 155* 135*     Recent Labs     07/24/25 2326 07/26/25 0453 07/27/25  0108   SODIUM 135 137 139   POTASSIUM 3.5* 3.6 3.8   CHLORIDE 99 101 101   CO2 20 23 25   GLUCOSE 124* 112* 107*   BUN 31* 35* 45*   CREATININE 2.77* 3.57* 4.52*   CALCIUM 8.6 8.4* 8.4*     Recent Labs     07/24/25  1842   APTT 30.6   INR 0.92     Recent Labs     07/24/25  1842   NTPROBNP [id number]*           Imaging  CT-CTA HEAD WITH & W/O-POST PROCESS   Final Result         1.  No large vessel occlusion or aneurysm identified      CT-ABDOMEN-PELVIS WITH   Final Result         1.  Hazy right perinephric fat stranding, consider component of pyelonephritis.   2.  Hepatomegaly.   3.  Mild intrahepatic and extrahepatic biliary ductal dilatation, consider causes of biliary obstruction with additional workup as clinically appropriate.            DX-CHEST-PORTABLE (1 VIEW)   Final Result      No acute cardiopulmonary disease.      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  Renal dose all meds  Avoid nephrotoxins like NSAIDs.  Prognosis guarded.    Plan discussed with Dr. Hernandez

## 2025-07-27 NOTE — ASSESSMENT & PLAN NOTE
Hepatitis C antibody positive  HCV quant greater than 1 million  - Planning for outpatient hepatitis C treatment  - Infectious disease consulted previously; will follow-up in clinic

## 2025-07-27 NOTE — CARE PLAN
The patient is Stable - Low risk of patient condition declining or worsening    Shift Goals  Clinical Goals: Monitor BP, Monitor renal fxn, VSS  Patient Goals: Rest  Family Goals: Updates    Progress made toward(s) clinical / shift goals:      Problem: Pain - Standard  Goal: Alleviation of pain or a reduction in pain to the patient’s comfort goal  Outcome: Progressing     Problem: Knowledge Deficit - Standard  Goal: Patient and family/care givers will demonstrate understanding of plan of care, disease process/condition, diagnostic tests and medications  Outcome: Progressing     Problem: Psychosocial Needs:  Goal: Ability to cope will improve  Outcome: Progressing     Problem: Urinary Elimination:  Goal: Ability to achieve and maintain adequate renal perfusion and functioning will improve  Outcome: Progressing   -Urine now yellow in color and more clear  Goal: Ability to achieve a balanced intake and output will improve  Outcome: Progressing     Problem: Physical Regulation:  Goal: Diagnostic test results will improve  Outcome: Progressing     Problem: Knowledge Deficit:  Goal: Patient's knowledge of the prescribed therapeutic regimen will improve  Outcome: Progressing

## 2025-07-28 ASSESSMENT — ENCOUNTER SYMPTOMS
SHORTNESS OF BREATH: 1
ABDOMINAL PAIN: 0
VOMITING: 0
CONSTIPATION: 0
EYES NEGATIVE: 1
BACK PAIN: 0
SPUTUM PRODUCTION: 0
FEVER: 0
HEADACHES: 1
WEAKNESS: 0
NERVOUS/ANXIOUS: 0
WEIGHT LOSS: 0
FLANK PAIN: 1
CHILLS: 0
WHEEZING: 0
NAUSEA: 0
MYALGIAS: 0
FLANK PAIN: 0
SINUS PAIN: 0
COUGH: 0
ORTHOPNEA: 0
DIARRHEA: 0
BLURRED VISION: 0
SHORTNESS OF BREATH: 0
PALPITATIONS: 0
DOUBLE VISION: 0
HEMOPTYSIS: 0

## 2025-07-28 ASSESSMENT — LIFESTYLE VARIABLES: SUBSTANCE_ABUSE: 1

## 2025-07-28 ASSESSMENT — PAIN DESCRIPTION - PAIN TYPE
TYPE: ACUTE PAIN
TYPE: ACUTE PAIN

## 2025-07-28 ASSESSMENT — FIBROSIS 4 INDEX: FIB4 SCORE: 1.87

## 2025-07-28 NOTE — CARE PLAN
The patient is Stable - Low risk of patient condition declining or worsening    Shift Goals  Clinical Goals: monitor labs, hemodynamic stability  Patient Goals: Rest  Family Goals: Updates    Progress made toward(s) clinical / shift goals:      Problem: Knowledge Deficit - Standard  Goal: Patient and family/care givers will demonstrate understanding of plan of care, disease process/condition, diagnostic tests and medications  Outcome: Progressing  Note: Plan of care d/w patient.       Patient is not progressing towards the following goals:

## 2025-07-28 NOTE — CARE PLAN
The patient is Stable - Low risk of patient condition declining or worsening    Shift Goals  Clinical Goals: Monitor BP, VSS, Safety, Monitor renal fxn, Educate  Patient Goals: Rest  Family Goals: Updates    Progress made toward(s) clinical / shift goals:      Problem: Pain - Standard  Goal: Alleviation of pain or a reduction in pain to the patient’s comfort goal  Outcome: Progressing     Problem: Knowledge Deficit - Standard  Goal: Patient and family/care givers will demonstrate understanding of plan of care, disease process/condition, diagnostic tests and medications  Outcome: Progressing     Problem: Psychosocial Needs:  Goal: Ability to cope will improve  Outcome: Progressing     Problem: Urinary Elimination:  Goal: Ability to achieve and maintain adequate renal perfusion and functioning will improve  Outcome: Progressing  Goal: Ability to achieve a balanced intake and output will improve  Outcome: Progressing     Problem: Physical Regulation:  Goal: Diagnostic test results will improve  Outcome: Progressing     Problem: Knowledge Deficit:  Goal: Patient's knowledge of the prescribed therapeutic regimen will improve  Outcome: Progressing

## 2025-07-28 NOTE — PROGRESS NOTES
Hospital Medicine Daily Progress Note    Date of Service  7/28/2025    Chief Complaint  Lissette Lee is a 40 y.o. female admitted 7/24/2025 with hematuria    Hospital Course  Lissette Lee is a 40 y.o. female with history of hypertension, endometriosis, status post hysterectomy, solitary kidney presented hematuria x 2 weeks.  Patient noted to have TACOS on presentation.  Urology and nephrology consulted.  Urology recommend outpatient cystoscopy.  Patient is currently undergoing autoimmune workup for TACOS which has been negative thus far.  Patient found to have hepatitis C which is presumed to be cause of her TACOS.  ID will see if possible to start therapy while inpatient.  Additionally treating for UTI with Flagyl.    Interval Problem Update  Patient was seen and examined at bedside.  No acute events overnight. Patient is resting comfortably in bed and in no acute distress. Update to patients mom via phone.     Consults placed to urology, nephrology  Outpatient cystoscopy recommended  Cr 4.28  JOHN - not detected IgG negative, connective tissue panel negative  Hepatitis C antibody positive    - HCV quant positive     - discussed possibly starting therapy with ID  Urine culture - gardnerella vaginalis     - continue flagyl  No plan for renal biopsy given solitary kidney  Increase clonidine to 0.2 and hydralazine to 75mg    I have discussed this patient's plan of care and discharge plan at IDT rounds today with Case Management, Nursing, Nursing leadership, and other members of the IDT team.    Consultants/Specialty  nephrology and urology    Code Status  Full Code    Disposition  The patient is not medically cleared for discharge to home or a post-acute facility.      I have placed the appropriate orders for post-discharge needs.    Review of Systems  Review of Systems   Constitutional:  Negative for chills and fever.   HENT:  Negative for congestion.    Eyes:  Negative for blurred vision and double vision.    Respiratory:  Negative for cough and shortness of breath.    Cardiovascular:  Negative for chest pain and palpitations.   Gastrointestinal:  Negative for nausea and vomiting.   Genitourinary:  Positive for flank pain and hematuria.   Musculoskeletal:  Negative for back pain.   Neurological:  Positive for headaches.   Psychiatric/Behavioral:  Positive for substance abuse.         Physical Exam  Temp:  [36.3 °C (97.3 °F)-36.9 °C (98.4 °F)] 36.9 °C (98.4 °F)  Pulse:  [62-74] 62  Resp:  [15-16] 16  BP: (123-183)/() 183/126  SpO2:  [96 %-99 %] 99 %    Physical Exam  Constitutional:       General: She is not in acute distress.  HENT:      Head: Normocephalic.      Right Ear: External ear normal.      Left Ear: External ear normal.      Nose: No congestion.   Eyes:      General: No scleral icterus.  Cardiovascular:      Rate and Rhythm: Normal rate.   Pulmonary:      Breath sounds: No wheezing.   Abdominal:      Tenderness: There is no abdominal tenderness. There is no guarding or rebound.   Musculoskeletal:         General: No swelling.   Skin:     Coloration: Skin is not jaundiced.      Findings: No bruising.   Neurological:      General: No focal deficit present.      Mental Status: She is alert.      Motor: No weakness.   Psychiatric:         Mood and Affect: Mood normal.         Behavior: Behavior normal.       Patient was seen and examined at bedside. No changes in physical exam from prior evaluation.    Fluids    Intake/Output Summary (Last 24 hours) at 7/28/2025 1230  Last data filed at 7/28/2025 0857  Gross per 24 hour   Intake 400 ml   Output 1625 ml   Net -1225 ml        Laboratory  Recent Labs     07/26/25  0453 07/27/25  0108 07/28/25  0200   WBC 8.1 6.4 6.3   RBC 4.94 4.69 4.99   HEMOGLOBIN 10.9* 10.4* 11.2*   HEMATOCRIT 34.5* 33.6* 36.4*   MCV 69.8* 71.6* 72.9*   MCH 22.1* 22.2* 22.4*   MCHC 31.6* 31.0* 30.8*   RDW 52.8* 54.3* 55.6*   PLATELETCT 155* 135* 139*     Recent Labs     07/26/25  0453  07/27/25  0108 07/28/25  0200   SODIUM 137 139 137   POTASSIUM 3.6 3.8 3.8   CHLORIDE 101 101 101   CO2 23 25 22   GLUCOSE 112* 107* 90   BUN 35* 45* 41*   CREATININE 3.57* 4.52* 4.28*   CALCIUM 8.4* 8.4* 8.4*     Recent Labs     07/28/25  0200   INR 0.92               Imaging  CT-CTA HEAD WITH & W/O-POST PROCESS   Final Result         1.  No large vessel occlusion or aneurysm identified      CT-ABDOMEN-PELVIS WITH   Final Result         1.  Hazy right perinephric fat stranding, consider component of pyelonephritis.   2.  Hepatomegaly.   3.  Mild intrahepatic and extrahepatic biliary ductal dilatation, consider causes of biliary obstruction with additional workup as clinically appropriate.            DX-CHEST-PORTABLE (1 VIEW)   Final Result      No acute cardiopulmonary disease.      EC-ECHOCARDIOGRAM COMPLETE W/O CONT    (Results Pending)        Assessment/Plan  * TACOS (acute kidney injury) (HCC)- (present on admission)  Assessment & Plan  40-year-old female with history of uncontrolled hypertension, reported solitary kidney  CT of the abdomen pelvis with contrast showed possible inflammation of the right kidney, left kidney is not visualized.  No hydronephrosis  Cr 4.28  JOHN - not detected IgG negative, connective tissue panel negative  Hepatitis C antibody positive    - HCV quant positive     - discussed possibly starting therapy with ID  Urine culture - gardnerella vaginalis     - continue flagyl  No plan for renal biopsy given solitary kidney    Acute hepatitis C virus infection  Assessment & Plan  Hepatitis C antibody positive    - HCV quant positive     - discussed possibly starting therapy with ID    Hematuria  Assessment & Plan  Hematuria with blood clots on and off in the last 2 weeks  Urology consulted  Outpatient cystoscopy recommended    Hypertensive urgency  Assessment & Plan  Blood pressure at urgent care is as high as 240/170, improved after IV hydralazine  Increase clonidine to 0.2 and hydralazine  to 75mg  Continue bisoprolol, started this admission, titrate accordingly    Elevated troponin  Assessment & Plan  Troponin 55, proBNP 34,514.  Suspected cardiomyopathy, perhaps hypertensive in etiology  Likely sequelae of hypertensive urgency in setting of TACOS  Echo ordered    Pyelonephritis  Assessment & Plan  Reported mild right CVA tenderness.  UA positive for bacteria, leukocyte esterase, WBC elevation  CT of the abdomen showed possible pyelonephritis  Urine culture - gardnerella vaginalis     - continue flagyl    Amphetamine abuse (HCC)  Assessment & Plan  UDS positive amphetamines    Headache  Assessment & Plan  Probably secondary to uncontrolled hypertension.  CT a of the head with and without contrast showed no acute abnormalities  Neurochecks every 4 hours  Tylenol, oxycodone as needed  Blood pressure control    Hypokalemia  Assessment & Plan  monitor         VTE prophylaxis: VTE Selection    I have performed a physical exam and reviewed and updated ROS and Plan today (7/28/2025). In review of yesterday's note (7/27/2025), there are no changes except as documented above.    Greater than 51 minutes spent prepping to see patient (e.g. review of tests) obtaining and/or reviewing separately obtained history. Performing a medically appropriate examination and/ evaluation.  Counseling and educating the patient/family/caregiver.  Ordering medications, tests, or procedures.  Referring and communicating with other health care professionals.  Documenting clinical information in EPIC.  Independently interpreting results and communicating results to patient/family/caregiver.  Care coordination.

## 2025-07-28 NOTE — HOSPITAL COURSE
Lissette Lee is a 40-year-old female with PMHx HTN, endometriosis s/p hysterectomy, solitary kidney.  Admitted 7/24 for hematuria.    For history: Patient has been having hematuria over 2 weeks prior to admission.    In the ED: Patient was noted to have an TACOS.  Creatinine 2.77 on admission.  UA notable for gross hematuria.  CT A/P: Hazy right perinephritic fat stranding, consider component of pyelonephritis.  Mild intrahepatic and extrahepatic biliary duct dilation.  Urine cultures positive for Gardnerella.    Urology was consulted and recommended outpatient cystoscopy.  Nephrology was consulted.  Patient was found to be hepatitis C positive.  Infectious disease was consulted   For possible inpatient treatment of hepatitis C.    Nephrology not recommending kidney biopsy given solitary kidney.  Infectious disease recommending outpatient treatment for hepatitis C.  Difficult to initiate while inpatient as antivirals are not on formulary and generally require a 3-month treatment course.   and the importance of close follow-up.  In no uncertain terms, I explained that if patient continues to have uncontrolled blood pressure, continues to use methamphetamines or does not follow-up for treatment of hepatitis C-this could result in kidney failure, cardiac arrest and ultimately her untimely death.  Patient states understanding.  She is motivated for her follow-up.  She does not wish to start dialysis.  ACP time spent 19 minutes.

## 2025-07-28 NOTE — PROGRESS NOTES
Nephrology Daily Progress Note    Date of Service  7/28/2025    Chief Complaint  40 y.o. female admitted 7/24/2025 with hematuria, found to have TACOS    Interval Problem Update  Patient has no chest pain or shortness of breath  7/27 patient has no new complaints  7/28 -doing better, no acute events, no new complaints  Serology: low C4, MPO, PR3 pending  Creat level improving  Good UOP  Review of Systems  Review of Systems   Constitutional:  Negative for chills, fever, malaise/fatigue and weight loss.   HENT:  Negative for congestion, hearing loss and sinus pain.    Eyes: Negative.    Respiratory:  Negative for cough, hemoptysis, shortness of breath and wheezing.    Cardiovascular:  Negative for chest pain, palpitations, orthopnea and leg swelling.   Gastrointestinal:  Negative for abdominal pain, diarrhea, nausea and vomiting.   Genitourinary:  Negative for dysuria, flank pain, frequency, hematuria and urgency.   Skin: Negative.    All other systems reviewed and are negative.       Physical Exam  Temp:  [36.3 °C (97.3 °F)-36.9 °C (98.4 °F)] 36.9 °C (98.4 °F)  Pulse:  [62-74] 62  Resp:  [15-16] 16  BP: (119-183)/() 119/78  SpO2:  [96 %-99 %] 99 %    Physical Exam  Vitals reviewed.   Constitutional:       General: She is not in acute distress.     Appearance: Normal appearance. She is well-developed. She is not diaphoretic.   HENT:      Head: Normocephalic and atraumatic.      Nose: Nose normal.      Mouth/Throat:      Mouth: Mucous membranes are moist.      Pharynx: Oropharynx is clear.   Eyes:      Extraocular Movements: Extraocular movements intact.      Conjunctiva/sclera: Conjunctivae normal.      Pupils: Pupils are equal, round, and reactive to light.   Cardiovascular:      Rate and Rhythm: Normal rate and regular rhythm.      Pulses: Normal pulses.      Heart sounds: Normal heart sounds.   Pulmonary:      Effort: Pulmonary effort is normal. No respiratory distress.      Breath sounds: Normal breath sounds.  "No wheezing or rales.   Abdominal:      General: Bowel sounds are normal. There is no distension.      Palpations: Abdomen is soft. There is no mass.      Tenderness: There is no abdominal tenderness. There is no right CVA tenderness or left CVA tenderness.   Musculoskeletal:      Cervical back: Normal range of motion and neck supple.      Right lower leg: No edema.      Left lower leg: No edema.   Skin:     General: Skin is warm.      Coloration: Skin is not pale.      Findings: No erythema or rash.   Neurological:      General: No focal deficit present.      Mental Status: She is alert and oriented to person, place, and time.      Cranial Nerves: No cranial nerve deficit.      Coordination: Coordination normal.   Psychiatric:         Mood and Affect: Mood normal.         Behavior: Behavior normal.         Thought Content: Thought content normal.         Judgment: Judgment normal.         Fluids    Intake/Output Summary (Last 24 hours) at 7/28/2025 1440  Last data filed at 7/28/2025 0857  Gross per 24 hour   Intake 400 ml   Output 1625 ml   Net -1225 ml       Laboratory  Recent Labs     07/26/25  0453 07/27/25  0108 07/28/25  0200   WBC 8.1 6.4 6.3   RBC 4.94 4.69 4.99   HEMOGLOBIN 10.9* 10.4* 11.2*   HEMATOCRIT 34.5* 33.6* 36.4*   MCV 69.8* 71.6* 72.9*   MCH 22.1* 22.2* 22.4*   MCHC 31.6* 31.0* 30.8*   RDW 52.8* 54.3* 55.6*   PLATELETCT 155* 135* 139*     Recent Labs     07/26/25  0453 07/27/25  0108 07/28/25  0200   SODIUM 137 139 137   POTASSIUM 3.6 3.8 3.8   CHLORIDE 101 101 101   CO2 23 25 22   GLUCOSE 112* 107* 90   BUN 35* 45* 41*   CREATININE 3.57* 4.52* 4.28*   CALCIUM 8.4* 8.4* 8.4*     Recent Labs     07/28/25  0200   INR 0.92     No results for input(s): \"NTPROBNP\" in the last 72 hours.          Imaging  CT-CTA HEAD WITH & W/O-POST PROCESS   Final Result         1.  No large vessel occlusion or aneurysm identified      CT-ABDOMEN-PELVIS WITH   Final Result         1.  Hazy right perinephric fat " stranding, consider component of pyelonephritis.   2.  Hepatomegaly.   3.  Mild intrahepatic and extrahepatic biliary ductal dilatation, consider causes of biliary obstruction with additional workup as clinically appropriate.            DX-CHEST-PORTABLE (1 VIEW)   Final Result      No acute cardiopulmonary disease.      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  Renal dose all meds  Avoid nephrotoxins like NSAIDs.  Prognosis guarded.    discussed with Dr. Hernandez

## 2025-07-28 NOTE — PROGRESS NOTES
Tele Strip     Rate: 61-76bpm; 8 beats of vtach  Rhythm: SR  Ectopy: PVC  Measurements: 0.14/0.07/0.36

## 2025-07-28 NOTE — CONSULTS
Consults  INFECTIOUS DISEASES INPATIENT CONSULT NOTE     Date of Service: 7/28/2025    Consult Requested By: Dameon Hernandez D.O.    Reason for Consultation: Chronic Hepatitis C    History of Present Illness:   Lissette Lee is a 40 y.o.  admitted 7/24/2025. Pt has a past medical history of uncontrolled HTN, solitary kidney and was admitted with hematuria and acute kidney injury. CT with contrast showed possible inflammation of the right kidney, no hydronephrosis.  UA with proteinuria, hematuria and concern for possible glomerulonephritis.  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  ID consulted regarding starting hepatitis C treatment while in the hospital.    Hospital Course:   She remains afebrile, no leukocytosis, kidney function has continued to decline.  UDS positive for amphetamines.  No lower extremity edema, she is complaining of some shortness of breath.  Chest x-ray on 7/24 was benign.    Review Of Systems:  Review of Systems   Constitutional:  Positive for malaise/fatigue. Negative for chills and fever.   HENT:  Negative for hearing loss.    Eyes:  Negative for blurred vision and double vision.   Respiratory:  Positive for shortness of breath. Negative for cough and sputum production.    Cardiovascular:  Negative for chest pain.   Gastrointestinal:  Negative for abdominal pain, constipation, diarrhea, nausea and vomiting.   Genitourinary:  Negative for dysuria and flank pain.   Musculoskeletal:  Negative for myalgias.   Skin:  Positive for rash.   Neurological:  Negative for weakness.   Psychiatric/Behavioral:  The patient is not nervous/anxious.        PMH:   Past Medical History[1]    PSH:  Past Surgical History[2]    FAMILY HX:  Family History   Problem Relation Age of Onset    Breast Cancer Sister     Cancer Maternal Grandmother         breast  "cancer      Reviewed family history. No pertinent family history.     SOCIAL HX:  Social History     Socioeconomic History    Marital status:      Spouse name: Not on file    Number of children: Not on file    Years of education: Not on file    Highest education level: Not on file   Occupational History    Not on file   Tobacco Use    Smoking status: Never    Smokeless tobacco: Never   Vaping Use    Vaping status: Never Used   Substance and Sexual Activity    Alcohol use: Yes     Comment: soc    Drug use: Not Currently     Types: Inhaled    Sexual activity: Yes     Partners: Male   Other Topics Concern    Not on file   Social History Narrative    Not on file     Social Drivers of Health     Financial Resource Strain: Not on file   Food Insecurity: Not on file   Transportation Needs: Not on file   Physical Activity: Not on file   Stress: Not on file   Social Connections: Not on file   Intimate Partner Violence: Not At Risk (2025)    Humiliation, Afraid, Rape, and Kick questionnaire     Fear of Current or Ex-Partner: No     Emotionally Abused: No     Physically Abused: No     Sexually Abused: No   Housing Stability: Not on file     Tobacco Use History[3]  Social History     Substance and Sexual Activity   Alcohol Use Yes    Comment: soc       Allergies/Intolerances:  Allergies[4]    History reviewed with the patient and /or family member, chart & primary care team    Other Current Medications:  Current Medications[5]  [unfilled]    Most Recent Vital Signs:  /78   Pulse 62   Temp 36.9 °C (98.4 °F) (Temporal)   Resp 16   Ht 1.651 m (5' 5\")   Wt 57.5 kg (126 lb 12.2 oz)   LMP 2022 (Exact Date)   SpO2 99%   BMI 21.09 kg/m²   Temp  Av.6 °C (97.9 °F)  Min: 36.1 °C (97 °F)  Max: 37 °C (98.6 °F)    Physical Exam:  Physical Exam  Constitutional:       Appearance: Normal appearance.   HENT:      Head: Normocephalic and atraumatic.      Right Ear: External ear normal.      Left Ear: " External ear normal.      Nose: Nose normal.      Mouth/Throat:      Mouth: Mucous membranes are moist.      Pharynx: Oropharynx is clear.   Eyes:      Conjunctiva/sclera: Conjunctivae normal.      Pupils: Pupils are equal, round, and reactive to light.   Cardiovascular:      Rate and Rhythm: Normal rate and regular rhythm.      Heart sounds: Normal heart sounds.   Pulmonary:      Effort: Pulmonary effort is normal.      Breath sounds: Normal breath sounds.   Abdominal:      General: Abdomen is flat. Bowel sounds are normal.      Palpations: Abdomen is soft.   Musculoskeletal:         General: Normal range of motion.      Right lower leg: No edema.      Left lower leg: No edema.   Skin:     Findings: Rash present.      Comments: Bilateral feet with faint, red, macropapular rash   Neurological:      General: No focal deficit present.      Mental Status: She is alert and oriented to person, place, and time.   Psychiatric:         Mood and Affect: Mood normal.         Behavior: Behavior normal.           Pertinent Lab Results:  Recent Labs     07/26/25 0453 07/27/25  0108 07/28/25  0200   WBC 8.1 6.4 6.3      Recent Labs     07/26/25  0453 07/27/25  0108 07/28/25  0200   HEMOGLOBIN 10.9* 10.4* 11.2*   HEMATOCRIT 34.5* 33.6* 36.4*   MCV 69.8* 71.6* 72.9*   MCH 22.1* 22.2* 22.4*   PLATELETCT 155* 135* 139*         Recent Labs     07/26/25  0453 07/27/25  0108 07/28/25  0200   SODIUM 137 139 137   POTASSIUM 3.6 3.8 3.8   CHLORIDE 101 101 101   CO2 23 25 22   CREATININE 3.57* 4.52* 4.28*        Recent Labs     07/26/25  0453 07/27/25  0108 07/28/25  0200   ALBUMIN 3.3 2.9* 3.0*        Pertinent Micro:  Results       Procedure Component Value Units Date/Time    URINE CULTURE(NEW) [[id number]]  (Abnormal) Collected: 07/24/25 1950    Order Status: Completed Specimen: Urine Updated: 07/27/25 1212     Significant Indicator POS     Source UR     Site -     Culture Result -      Gardnerella vaginalis  >100,000 cfu/mL       "URINALYSIS CULTURE, IF INDICATED [[id number]]  (Abnormal) Collected: 07/24/25 1950    Order Status: Completed Specimen: Urine, Clean Catch Updated: 07/24/25 2044     Color Red     Character Cloudy     Specific Gravity 1.012     Ph 6.0     Glucose Negative mg/dL      Ketones Negative mg/dL      Protein 300 mg/dL      Bilirubin Negative     Urobilinogen, Urine 0.2 EU/dL      Nitrite Negative     Leukocyte Esterase Moderate     Occult Blood Large     Micro Urine Req Microscopic          No results found for: \"BLOODCULTU\", \"BLDCULT\", \"BCHOLD\"     Studies:  CT-ABDOMEN-PELVIS WITH  Result Date: 7/24/2025 7/24/2025 8:32 PM HISTORY/REASON FOR EXAM: Hematuria TECHNIQUE/EXAM DESCRIPTION:   CT scan of the abdomen and pelvis with contrast. Contrast-enhanced helical scanning was obtained from the diaphragmatic domes through the pubic symphysis following the bolus administration of nonionic contrast without complication. 100 mL of Omnipaque 350 nonionic contrast was administered without complication. Low dose optimization technique was utilized for this CT exam including automated exposure control and adjustment of the mA and/or kV according to patient size. COMPARISON: July 27, 2022 FINDINGS: Lower Chest: Linear densities the bilateral lung bases are seen favoring changes of atelectasis. Liver: Hepatomegaly is seen. Mild intrahepatic biliary ductal dilatation is noted. Spleen: Unremarkable. Pancreas: Unremarkable. Gallbladder: No calcified stones. Biliary: 7 mm dilatation the common bile duct is seen. Adrenal glands: Normal. Kidneys: Left kidney is not visualized. Hazy right perinephric fat stranding is seen. Bowel: No obstruction or acute inflammation. Appendix is surgically absent by history. Lymph nodes: No adenopathy. Vasculature: Unremarkable. Peritoneum: Unremarkable without ascites. Musculoskeletal: No acute or destructive process. Pelvis: No adenopathy or free fluid. Postsurgical changes of hysterectomy are seen. "     1.  Hazy right perinephric fat stranding, consider component of pyelonephritis. 2.  Hepatomegaly. 3.  Mild intrahepatic and extrahepatic biliary ductal dilatation, consider causes of biliary obstruction with additional workup as clinically appropriate.     CT-CTA HEAD WITH & W/O-POST PROCESS  Result Date: 7/24/2025 7/24/2025 8:32 PM HISTORY/REASON FOR EXAM:  Headaches, HTN urgency TECHNIQUE/EXAM DESCRIPTION: CT angiogram of the [place] without and with contrast.  Initial precontrast images were obtained of the head from the skull base through the vertex.  Postcontrast images were obtained of the [place] following the power injection of nonionic contrast at 5.0 mL/sec. Thin-section helical images were obtained with overlapping reconstruction interval. Coronal and sagittal multiplanar volume reformats were generated.  3D angiographic images were reviewed on PACS.  Maximum intensity projection (MIP) images were generated and reviewed. 100 mL of Omnipaque 350 nonionic contrast was injected intravenously. Low dose optimization technique was utilized for this CT exam including automated exposure control and adjustment of the mA and/or kV according to patient size. COMPARISON:  None. FINDINGS: The posterior circulation shows the distal vertebral arteries to be patent. The vertebrobasilar confluence is intact. The basilar artery is patent. No aneurysm or occlusive lesion is evident. Persistent fetal morphology of the bilateral posterior cerebral arteries is seen. The anterior circulation shows no stenotic or occlusive lesion. No aneurysm is evident about the [place]. Mild diffuse parenchymal volume loss is seen. Scattered periventricular and subcortical white matter low-density changes are seen, nonspecific findings, commonly associated with small vessel ischemia. 3D angiographic/MIP images of the vasculature confirm the vascular findings as described above.     1.  No large vessel  occlusion or aneurysm identified    DX-CHEST-PORTABLE (1 VIEW)  Result Date: 7/24/2025 7/24/2025 6:29 PM HISTORY/REASON FOR EXAM:  Hypertension. TECHNIQUE/EXAM DESCRIPTION AND NUMBER OF VIEWS: Single portable view of the chest. COMPARISON: 9/28/2022 FINDINGS: Cardiomediastinal contour is within normal limits. No focal pulmonary consolidation. No pleural fluid collection or pneumothorax. No major bony abnormality is seen.     No acute cardiopulmonary disease.      ASSESSMENT/PLAN:     40 y.o.  admitted 7/24/2025. Pt has a past medical history of uncontrolled HTN, solitary kidney and was admitted with hematuria and acute kidney injury. CT with contrast showed possible inflammation of the right kidney, no hydronephrosis.  UA with proteinuria, hematuria and concern for possible glomerulonephritis.  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  ID consulted regarding starting hepatitis C treatment while in the hospital.    Hospital Course:   She remains afebrile, no leukocytosis, kidney function has continued to decline.  UDS positive for amphetamines.  No lower extremity edema, she is complaining of some shortness of breath.  Chest x-ray on 7/24 was benign.    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   -Reviewed labs to date, microbiology for  current admit and prior  -Imaging was independently reviewed and interpreted    Plan:    Reviewed the literature and hepatitis C is known to cause of glomerulonephritis including MPGN with or without cryoglobulinemia. According to 2022 KDIGO guidelines Hep C should be treated with direct acting antiviral but this is provided kidney function is stable and no nephrotic syndrome.  Per the guideline patients with cryoglobulinemic flare or rapidly progressive glomerulonephritis should be treated with both DAA's and immunosuppressive agents with or without plasma exchange the patient's kidney function is not stable and has been worsening. Nephrotic syndrome not obvious from my exam.  I am unsure if she has rapidly progressive GN or it any testing has been done for cryoglobulins which would be helpful. She has elevated protein and occult blood on UA, low C4, GMB ab negative, JOHN negative, additional testing pending.   --- Prefer to obtain kidney biopsy to definitively diagnose the patient but she has solitary kidney so hesitancy to move forward with biopsy.  Treatment for hepatitis C will be difficult to initiate while in the hospital as antivirals are not on the formulary and  generally a 3-month course. I spoke with pharmacy and it is possible we could start DAAs but would need her to meet recommended criteria as above and this would be  per nephrology. She certainly should be treated but this is usually done on an outpatient basis.  I will rely on nephrology to comment on whether they feel her current TACOS is GN with the most likely cause being hepatitis C, whether rapidly progressive or cryoglobulin flare, and for any additional testing to confirm in lieu of biopsy.  She does have faint rash on her legs potentially we could biopsy one of these lesions for cryoglobulins, cryoglobulin blood test and surrogate markers such ar RF  --- Management of her TACOS with immunosuppressive agents per nephrology   --- Will send labs to  facilitate potential hepatitis C treatment, genotype, hepatitis A and B testing, etc.  --- Okay to continue Flagyl for now for potential bacterial vaginitis I will discuss symptoms with her tomorrow    Dispo: Awaiting culture results and work-up as above.  Patient is at risk for infectious complications including death.    PICC: TBD    Plan of care discussed with IM Dameon Hernandez D.O and ID pharmacy. Will continue to follow.    Flora Best M.D.         [1]   Past Medical History:  Diagnosis Date    ASTHMA     Hypertension     Mass of ovary    [2]   Past Surgical History:  Procedure Laterality Date    MT CYSTOSCOPY,INSERT URETERAL STENT Bilateral 10/13/2022    Procedure: CYSTOSCOPY, WITH URETERAL STENT INSERTION;  Surgeon: Eliseo Briscoe M.D.;  Location: SURGERY [facility];  Service: Gyn Robotic    MT LAP,APPENDECTOMY  10/13/2022    Procedure: APPENDECTOMY, ROBOT-ASSISTED, USING DA JUANCHO XI;  Surgeon: Eliseo Briscoe M.D.;  Location: SURGERY [facility];  Service: Gyn Robotic    HYSTERECTOMY ROBOTIC XI  10/13/2022    Procedure: ROBOTIC HYSTERECTOMY,BILATERAL SALPINGO OOPHORECTOMY RIGHT URETERAL STENT, LEFT URETERAL LIGATION;  Surgeon: Eliseo Briscoe M.D.;  Location: SURGERY [facility];  Service: Gyn Robotic    SALPINGO OOPHORECTOMY Bilateral 10/13/2022    Procedure: SALPINGO-OOPHORECTOMY;  Surgeon: Eliseo Briscoe M.D.;  Location: SURGERY [facility];  Service: Gyn Robotic   [3]   Social History  Tobacco Use   Smoking Status Never   Smokeless Tobacco Never   [4] No Known Allergies  [5]   Current Facility-Administered Medications:     cloNIDine (Catapres) tablet 0.2 mg, 0.2 mg, Oral, TWICE DAILY, WHITNEY [place]O.    hydrALAZINE (Apresoline) tablet 75 mg, 75 mg, Oral, Q8HRS, WHITNEY [place]O., 75 mg at 07/28/25 1432    metroNIDAZOLE (Flagyl) tablet 500 mg, 500 mg, Oral, Q12HRS, WHITNEY [place]O., 500 mg at 07/28/25 0602    bisoprolol (Zebeta) tablet 10 mg, 10 mg, Oral, Q DAY, WHITNEY [place]O., 10 mg at  07/28/25 0602    labetalol (Normodyne/Trandate) injection 10 mg, 10 mg, Intravenous, Q4HRS PRN, Stephanie Josue A.P.R.N., 10 mg at 07/27/25 0510    hydrALAZINE (Apresoline) injection 20 mg, 20 mg, Intravenous, Q4HRS PRN, Stephanie Josue A.P.R.N., 20 mg at 07/28/25 1151    acetaminophen (Tylenol) tablet 650 mg, 650 mg, Oral, Q6HRS PRN, Addy Barnes M.D., 650 mg at 07/28/25 1241    oxyCODONE immediate-release (Roxicodone) tablet 2.5 mg, 2.5 mg, Oral, Q3HRS PRN **OR** oxyCODONE immediate-release (Roxicodone) tablet 5 mg, 5 mg, Oral, Q3HRS PRN, 5 mg at 07/27/25 1638 **OR** HYDROmorphone (Dilaudid) injection 0.25 mg, 0.25 mg, Intravenous, Q3HRS PRN, Addy Barnes M.D.    senna-docusate (Pericolace Or Senokot S) 8.6-50 MG per tablet 2 Tablet, 2 Tablet, Oral, Q EVENING **AND** polyethylene glycol/lytes (Miralax) Packet 1 Packet, 1 Packet, Oral, QDAY PRN, Addy Barnes M.D.    ondansetron (Zofran) syringe/vial injection 4 mg, 4 mg, Intravenous, Q4HRS PRN, Addy Barnes M.D.    ondansetron (Zofran ODT) dispertab 4 mg, 4 mg, Oral, Q4HRS PRN, Addy Barnes M.D.    promethazine (Phenergan) tablet 12.5-25 mg, 12.5-25 mg, Oral, Q4HRS PRN, Addy Barnes M.D.    promethazine (Phenergan) suppository 12.5-25 mg, 12.5-25 mg, Rectal, Q4HRS PRN, Addy Barnes M.D.    prochlorperazine (Compazine) injection 5-10 mg, 5-10 mg, Intravenous, Q4HRS PRN, Addy Barnes M.D.    Pharmacy Consult Request - to monitor for nephrotoxic agents, 1 Each, Other, PHARMACY TO DOSE, Addy Barnes M.D.    [Held by provider] asa/apap/caffeine (Excedrin) 250-250-65 MG per tablet 2 Tablet, 2 Tablet, Oral, QDAY PRN, Addy Barnes M.D., 2 Tablet at 07/26/25 0959

## 2025-07-28 NOTE — PROGRESS NOTES
Monitor Summary  Rhythm: SB/SR  Rate: 55-72  Ectopy: None  Measurements: .14/.08/.41

## 2025-07-29 ASSESSMENT — ENCOUNTER SYMPTOMS
COUGH: 0
NERVOUS/ANXIOUS: 0
SINUS PAIN: 0
FEVER: 0
ABDOMINAL PAIN: 0
CHILLS: 0
SHORTNESS OF BREATH: 0
EYES NEGATIVE: 1
PALPITATIONS: 0
DIARRHEA: 0
FLANK PAIN: 0
MYALGIAS: 0
NAUSEA: 0
WEIGHT LOSS: 0
CONSTIPATION: 0
HEMOPTYSIS: 0
ORTHOPNEA: 0
WHEEZING: 0
VOMITING: 0

## 2025-07-29 ASSESSMENT — FIBROSIS 4 INDEX: FIB4 SCORE: 1.42

## 2025-07-29 ASSESSMENT — PAIN DESCRIPTION - PAIN TYPE: TYPE: ACUTE PAIN

## 2025-07-29 NOTE — PROGRESS NOTES
Hospital Medicine Daily Progress Note    Date of Service  7/29/2025    Chief Complaint  Lissette Lee is a 40 y.o. female admitted 7/24/2025 with hematuria    Hospital Course  Lissette Lee is a 40-year-old female with PMHx HTN, endometriosis s/p hysterectomy, solitary kidney.  Admitted 7/24 for hematuria.    For history: Patient has been having hematuria over 2 weeks prior to admission.    In the ED: Patient was noted to have an TACOS.  Creatinine 2.77 on admission.  UA notable for gross hematuria.  CT A/P: Hazy right perinephritic fat stranding, consider component of pyelonephritis.  Mild intrahepatic and extrahepatic biliary duct dilation.  Urine cultures positive for Gardnerella.    Urology was consulted and recommended outpatient cystoscopy.  Nephrology was consulted.  Patient was found to be hepatitis C positive.  Infectious disease was consulted   For possible inpatient treatment of hepatitis C.    Nephrology not recommending kidney biopsy given solitary kidney.  Infectious disease recommending outpatient treatment for hepatitis C.  Difficult to initiate while inpatient as antivirals are not on formulary and generally require a 3-month treatment course.    Interval Problem Update  7/29: Vitals notable for SBP ranging 111 through 139.  WBC stable at 6.  Hb stable at 11.  Creatinine 4.75 from 4.28. Discussed with Dr. Ramos, nephrology.  Cryoglobulin workup pending.  Continue to hold off on renal biopsy given solitary kidney.  Start DVT prophylaxis with heparin.    I have discussed this patient's plan of care and discharge plan at IDT rounds today with Case Management, Nursing, Nursing leadership, and other members of the IDT team.    Consultants/Specialty  infectious disease, nephrology, and urology    Code Status  Full Code    Disposition  The patient is not medically cleared for discharge to home or a post-acute facility.  Anticipate discharge to: home with close outpatient follow-up    I have placed the  appropriate orders for post-discharge needs.    Review of Systems  Review of Systems   Constitutional:  Negative for fever and malaise/fatigue.   Respiratory:  Negative for shortness of breath.    Cardiovascular:  Negative for chest pain and leg swelling.   Gastrointestinal:  Negative for abdominal pain, nausea and vomiting.        Physical Exam  Temp:  [36 °C (96.8 °F)-37 °C (98.6 °F)] 36.6 °C (97.9 °F)  Pulse:  [55-64] 55  Resp:  [12-16] 15  BP: (109-183)/() 132/85  SpO2:  [96 %-99 %] 97 %    Physical Exam  Vitals and nursing note reviewed.   Constitutional:       General: She is not in acute distress.     Appearance: Normal appearance. She is ill-appearing.   Cardiovascular:      Rate and Rhythm: Normal rate and regular rhythm.   Pulmonary:      Effort: Pulmonary effort is normal.      Breath sounds: Normal breath sounds.   Musculoskeletal:         General: No swelling.   Skin:     General: Skin is warm and dry.      Coloration: Skin is pale.   Neurological:      Mental Status: She is alert and oriented to person, place, and time.   Psychiatric:         Mood and Affect: Mood normal.         Behavior: Behavior normal.         Fluids    Intake/Output Summary (Last 24 hours) at 7/29/2025 1123  Last data filed at 7/29/2025 0546  Gross per 24 hour   Intake 918 ml   Output 2050 ml   Net -1132 ml        Laboratory  Recent Labs     07/27/25 0108 07/28/25 0200 07/28/25  1811 07/29/25  0035   WBC 6.4 6.3  --  6.4   RBC 4.69 4.99  --  5.13   HEMOGLOBIN 10.4* 11.2*  --  11.4*   HEMATOCRIT 33.6* 36.4*  --  36.9*   MCV 71.6* 72.9*  --  71.9*   MCH 22.2* 22.4*  --  22.2*   MCHC 31.0* 30.8*  --  30.9*   RDW 54.3* 55.6*  --  55.2*   PLATELETCT 135* 139* 197 183     Recent Labs     07/27/25 0108 07/28/25  0200 07/29/25  0035   SODIUM 139 137 138   POTASSIUM 3.8 3.8 3.9   CHLORIDE 101 101 100   CO2 25 22 23   GLUCOSE 107* 90 117*   BUN 45* 41* 46*   CREATININE 4.52* 4.28* 4.75*   CALCIUM 8.4* 8.4* 8.5     Recent Labs      07/28/25  0200 07/28/25  1811   INR 0.92 0.99               Imaging  CT-CTA HEAD WITH & W/O-POST PROCESS   Final Result         1.  No large vessel occlusion or aneurysm identified      CT-ABDOMEN-PELVIS WITH   Final Result         1.  Hazy right perinephric fat stranding, consider component of pyelonephritis.   2.  Hepatomegaly.   3.  Mild intrahepatic and extrahepatic biliary ductal dilatation, consider causes of biliary obstruction with additional workup as clinically appropriate.            DX-CHEST-PORTABLE (1 VIEW)   Final Result      No acute cardiopulmonary disease.           Assessment/Plan  * TACOS (acute kidney injury) (HCC)- (present on admission)  Assessment & Plan  Broad differential; currently including RPGN, contrast-induced nephropathy and hepatitis C induced nephropathy  - Infectious disease consulted; recommending outpatient treatment for hepatitis C  - Nephrology following  - Deferring kidney biopsy at this time given solitary kidney  - Avoid nephrotoxins; renally dose medications  - Strict I's and O's  - Repeat BMP in a.m.  - Cryoglobulin workup pending  - Continuous telemetry monitoring; monitoring for arrhythmia in the setting of TACOS and electrolyte abnormalities    Acute hepatitis C virus infection  Assessment & Plan  Hepatitis C antibody positive  HCV quant greater than 1 million  - Planning for outpatient hepatitis C treatment  - Infectious disease consulted previously; will follow-up in clinic    Amphetamine abuse (HCC)  Assessment & Plan  UDS positive amphetamines    Headache  Assessment & Plan  Probably secondary to uncontrolled hypertension.  CT a of the head with and without contrast showed no acute abnormalities  Improved  - Discontinue neurochecks every 4 hours  - Pain medication as needed    Hypertensive urgency  Assessment & Plan  Blood pressure at urgent care is as high as 240/170, improved after IV hydralazine  -Continue clonidine 0.2 mg twice daily  - Continue hydralazine 75 mg  every 8 hours  - Discontinue Bisoprolol; transition to Coreg favoring nonselective beta-blocker over selective beta-blockade    Elevated troponin  Assessment & Plan  Troponin 55, proBNP 34,514.  Suspected cardiomyopathy, perhaps hypertensive in etiology  Recommend outpatient echocardiogram    Hematuria  Assessment & Plan  Hematuria with blood clots on and off in the last 2 weeks  Urology consulted  Outpatient cystoscopy recommended    Pyelonephritis  Assessment & Plan  Reported mild right CVA tenderness.  UA positive for bacteria, leukocyte esterase, WBC elevation  CT of the abdomen showed possible pyelonephritis  Urine culture - gardnerella vaginalis   -Continue Flagyl    Hypokalemia  Assessment & Plan  Potassium 3.9  - Repeat BMP in a.m.         VTE prophylaxis:   SCDs/TEDs   heparin ppx      I have performed a physical exam and reviewed and updated ROS and Plan today (7/29/2025). In review of yesterday's note (7/28/2025), there are no changes except as documented above.

## 2025-07-29 NOTE — PROGRESS NOTES
Monitor Summary  Rhythm: SB/SR  Rate: 54-62  Ectopy: (R) PVC  Measurements: .15/.08/.52  ---12 hr Chart Review---

## 2025-07-29 NOTE — PROGRESS NOTES
Infectious Disease Progress Note    Author: Flora Best M.D. Date & Time of service: 2025  8:53 AM    Chief Complaint:  Chronic Hepatitis C     Interval History:      Review of Systems:  Review of Systems   Gastrointestinal:  Negative for abdominal pain, constipation, diarrhea, nausea and vomiting.   Genitourinary:  Negative for dysuria and flank pain.   Musculoskeletal:  Negative for myalgias.   Psychiatric/Behavioral:  The patient is not nervous/anxious.        Hemodynamics:  Temp (24hrs), Av.6 °C (97.8 °F), Min:36 °C (96.8 °F), Max:37 °C (98.6 °F)  Temperature: 36.6 °C (97.9 °F)  Pulse  Av.6  Min: 55  Max: 119   Blood Pressure: 132/85       Physical Exam:  Physical Exam  Cardiovascular:      Rate and Rhythm: Normal rate and regular rhythm.      Heart sounds: Normal heart sounds.   Pulmonary:      Effort: Pulmonary effort is normal.      Breath sounds: Normal breath sounds.   Abdominal:      General: Abdomen is flat. Bowel sounds are normal. There is no distension.      Palpations: Abdomen is soft.      Tenderness: There is no right CVA tenderness or left CVA tenderness.   Musculoskeletal:         General: Normal range of motion.      Right lower leg: No edema.      Left lower leg: No edema.   Skin:     General: Skin is warm.      Findings: Rash present.      Comments: Faint red macular papular rash on bilateral feet   Psychiatric:         Mood and Affect: Mood normal.         Behavior: Behavior normal.         Meds:    Current Facility-Administered Medications:     cloNIDine    hydrALAZINE    metroNIDAZOLE    bisoprolol    labetalol    hydrALAZINE    acetaminophen    oxyCODONE immediate-release **OR** oxyCODONE immediate-release **OR** HYDROmorphone    senna-docusate **AND** polyethylene glycol/lytes    ondansetron    ondansetron    promethazine    promethazine    prochlorperazine    Pharmacy    [Held by provider] asa/apap/caffeine    Labs:  Recent Labs     25  0108 25  0200  07/28/25  1811 07/29/25  0035   WBC 6.4 6.3  --  6.4   RBC 4.69 4.99  --  5.13   HEMOGLOBIN 10.4* 11.2*  --  11.4*   HEMATOCRIT 33.6* 36.4*  --  36.9*   MCV 71.6* 72.9*  --  71.9*   MCH 22.2* 22.4*  --  22.2*   RDW 54.3* 55.6*  --  55.2*   PLATELETCT 135* 139* 197 183     Recent Labs     07/27/25  0108 07/28/25  0200 07/29/25  0035   SODIUM 139 137 138   POTASSIUM 3.8 3.8 3.9   CHLORIDE 101 101 100   CO2 25 22 23   GLUCOSE 107* 90 117*   BUN 45* 41* 46*     Recent Labs     07/27/25 0108 07/28/25 0200 07/29/25  0035   ALBUMIN 2.9* 3.0* 3.2   TBILIRUBIN  --  <0.2  --    ALKPHOSPHAT  --  72  --    TOTPROTEIN  --  5.6*  --    ALTSGPT  --  16  --    ASTSGOT  --  26  --    CREATININE 4.52* 4.28* 4.75*       Imaging:  CT-ABDOMEN-PELVIS WITH  Result Date: 7/24/2025 7/24/2025 8:32 PM HISTORY/REASON FOR EXAM: Hematuria TECHNIQUE/EXAM DESCRIPTION:   CT scan of the abdomen and pelvis with contrast. Contrast-enhanced helical scanning was obtained from the diaphragmatic domes through the pubic symphysis following the bolus administration of nonionic contrast without complication. 100 mL of Omnipaque 350 nonionic contrast was administered without complication. Low dose optimization technique was utilized for this CT exam including automated exposure control and adjustment of the mA and/or kV according to patient size. COMPARISON: July 27, 2022 FINDINGS: Lower Chest: Linear densities the bilateral lung bases are seen favoring changes of atelectasis. Liver: Hepatomegaly is seen. Mild intrahepatic biliary ductal dilatation is noted. Spleen: Unremarkable. Pancreas: Unremarkable. Gallbladder: No calcified stones. Biliary: 7 mm dilatation the common bile duct is seen. Adrenal glands: Normal. Kidneys: Left kidney is not visualized. Hazy right perinephric fat stranding is seen. Bowel: No obstruction or acute inflammation. Appendix is surgically absent by history. Lymph nodes: No adenopathy. Vasculature: Unremarkable. Peritoneum:  Unremarkable without ascites. Musculoskeletal: No acute or destructive process. Pelvis: No adenopathy or free fluid. Postsurgical changes of hysterectomy are seen.     1.  Hazy right perinephric fat stranding, consider component of pyelonephritis. 2.  Hepatomegaly. 3.  Mild intrahepatic and extrahepatic biliary ductal dilatation, consider causes of biliary obstruction with additional workup as clinically appropriate.     CT-CTA HEAD WITH & W/O-POST PROCESS  Result Date: 7/24/2025 7/24/2025 8:32 PM HISTORY/REASON FOR EXAM:  Headaches, HTN urgency TECHNIQUE/EXAM DESCRIPTION: CT angiogram of the [place] without and with contrast.  Initial precontrast images were obtained of the head from the skull base through the vertex.  Postcontrast images were obtained of the [place] following the power injection of nonionic contrast at 5.0 mL/sec. Thin-section helical images were obtained with overlapping reconstruction interval. Coronal and sagittal multiplanar volume reformats were generated.  3D angiographic images were reviewed on PACS.  Maximum intensity projection (MIP) images were generated and reviewed. 100 mL of Omnipaque 350 nonionic contrast was injected intravenously. Low dose optimization technique was utilized for this CT exam including automated exposure control and adjustment of the mA and/or kV according to patient size. COMPARISON:  None. FINDINGS: The posterior circulation shows the distal vertebral arteries to be patent. The vertebrobasilar confluence is intact. The basilar artery is patent. No aneurysm or occlusive lesion is evident. Persistent fetal morphology of the bilateral posterior cerebral arteries is seen. The anterior circulation shows no stenotic or occlusive lesion. No aneurysm is evident about the [place]. Mild diffuse parenchymal volume loss is seen. Scattered periventricular and subcortical white matter low-density changes are seen, nonspecific findings, commonly  associated with small vessel ischemia. 3D angiographic/MIP images of the vasculature confirm the vascular findings as described above.     1.  No large vessel occlusion or aneurysm identified    DX-CHEST-PORTABLE (1 VIEW)  Result Date: 7/24/2025 7/24/2025 6:29 PM HISTORY/REASON FOR EXAM:  Hypertension. TECHNIQUE/EXAM DESCRIPTION AND NUMBER OF VIEWS: Single portable view of the chest. COMPARISON: 9/28/2022 FINDINGS: Cardiomediastinal contour is within normal limits. No focal pulmonary consolidation. No pleural fluid collection or pneumothorax. No major bony abnormality is seen.     No acute cardiopulmonary disease.      Micro:  Results       Procedure Component Value Units Date/Time    URINE CULTURE(NEW) [[id number]]  (Abnormal) Collected: 07/24/25 1950    Order Status: Completed Specimen: Urine Updated: 07/27/25 1212     Significant Indicator POS     Source UR     Site -     Culture Result -      Gardnerella vaginalis  >100,000 cfu/mL      URINALYSIS CULTURE, IF INDICATED [[id number]]  (Abnormal) Collected: 07/24/25 1950    Order Status: Completed Specimen: Urine, Clean Catch Updated: 07/24/25 2044     Color Red     Character Cloudy     Specific Gravity 1.012     Ph 6.0     Glucose Negative mg/dL      Ketones Negative mg/dL      Protein 300 mg/dL      Bilirubin Negative     Urobilinogen, Urine 0.2 EU/dL      Nitrite Negative     Leukocyte Esterase Moderate     Occult Blood Large     Micro Urine Req Microscopic            Assessment:  Active Hospital Problems    Diagnosis     *TACOS (acute kidney injury) (HCC) [N17.9]     Acute hepatitis C virus infection [B17.10]     Hypokalemia [E87.6]     Pyelonephritis [N12]     Hematuria [R31.9]     Elevated troponin [R79.89]     Hypertensive urgency [I16.0]     Headache [R51.9]     Amphetamine abuse (HCC) [F15.10]      Interval 24 hours:      AF, O2 RA  Labs reviewed  New Imaging personally reviewed both images and report.  Studies reviewed  Micro reviewed  Notes from primary team  and specialists reviewed    Pt with no complaints, she is denying any dysuria or flank pain.  Denying any vaginal irritation or pruritus.    Antibiotics as below.       ASSESSMENT/PLAN:      40 y.o.  admitted 7/24/2025. Pt has a past medical history of uncontrolled HTN, solitary kidney and was admitted with hematuria and acute kidney injury. CT with contrast showed possible inflammation of the right kidney, no hydronephrosis.  UA with proteinuria, hematuria and concern for possible glomerulonephritis.  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  ID consulted regarding starting hepatitis C treatment while in the hospital.     Hospital Course:   She remains afebrile, no leukocytosis, kidney function has continued to decline.  UDS positive for amphetamines.  No lower extremity edema, she is complaining of some shortness of breath.  Chest x-ray on 7/24 was benign.     Problem List  Chronic hepatitis C  TACOS  -Concern for glomerulonephritis due to hepatitis C  Pyelonephritis  -CT with hazy right perinephric fat stranding, no fevers, leukocytosis, nausea/vomiting or flank pain to suggest infectious pyelonephritis, she received 3 days of ceftriaxone  Bacteriuria    - Urine culture positive for Gardnerella vaginalis  Rash on bilateral feet, faint, red, maculopapular  -Potential cryoglobulinemia  Hepatomegaly  Solitary right kidney  UDS positive for amphetamines    Plan:     Reviewed the literature and Hepatitis C is known to cause of glomerulonephritis including MPGN with or without cryoglobulinemia. According to 2022 KDIGO guidelines Hep C should be treated with DAA but this is provided kidney function is stable and no nephrotic syndrome.  Per the guideline patients with cryoglobulinemic flare or rapidly progressive glomerulonephritis should be treated with both DAA's and  immunosuppressive agents with or without plasma exchange the patient's kidney function is not stable and has been worsening. Nephrotic syndrome not obvious from my exam but very high protein on UA.  I am unsure if she has rapidly progressive GN or it any testing has been done for cryoglobulins which would be helpful. She has elevated protein and occult blood on UA, low C4, GMB ab negative, JOHN negative, additional testing pending.   --- Prefer to obtain kidney biopsy to definitively diagnose the patient but she has solitary kidney so hesitancy to move forward with biopsy.  Treatment for hepatitis C will be difficult to initiate while in the hospital as antivirals are not on the formulary and  generally a 3-month outpatient course. I spoke with pharmacy and it is possible we could start DAAs but this would need her to meet recommended criteria as above and this would be in conjunction with immunosuppressives per nephrology either prior to DAA or at the same time.  Per review rituximab is the overall recommended immune suppressive. She certainly should be treated for HCV and if not rapidly progressing GN then will wait for kidney to stabilize and treat ASAP on an outpatient basis.  I will rely on nephrology to comment on whether they feel her current TACOS is GN with the most likely cause being hepatitis C, whether rapidly progressive or cryoglobulin flare, and for any additional testing to confirm in lieu of biopsy.  She does have faint rash on her legs potentially we could biopsy one of these lesions for cryoglobulins, cryoglobulin blood test and surrogate markers such ar RF  --- Management of her TACOS with immunosuppressive agents per nephrology   --- Will send labs to facilitate potential hepatitis C treatment, HCV quant with reflex to genotype was reordered under miscellaneous, fibrosis serology, hepatitis A and B testing, etc. (hep B surface antibody negative, hep B surface antigen negative on prior testing, hep B  core antibody total negative)   --- TB quant ordered  --- Okay to continue po Flagyl for now for potential bacterial vaginitis, positive urine culture, will give a 7-day course      Dispo: Awaiting culture results and work-up as above.  Patient is at risk for infectious complications including death.     PICC: TBD     Plan of care discussed with Dr. Ramos, Dr. Best and ID pharmacy. Will continue to follow.     Flora Best M.D.

## 2025-07-29 NOTE — Clinical Note
REFERRAL APPROVAL NOTICE         Sent on July 29, 2025                   Lissette Lee  [address] [address]                   Dear Ms. Lee,    After a careful review of the medical information and benefit coverage, [facility] has processed your referral. See below for additional details.    If applicable, you must be actively enrolled with your insurance for coverage of the authorized service. If you have any questions regarding your coverage, please contact your insurance directly.    REFERRAL INFORMATION   Referral #:  [id number]  Referred-To Department    Referred-By Provider:  Urology    Meredith Limon P.A.-C.   [facility] Urology      [address]  [place] [address]  [phone] [address]  [place] [id number] [phone]    Referral Start Date:  07/25/2025  Referral End Date:   07/25/2026             SCHEDULING  If you do not already have an appointment, please call [phone] to make an appointment.     MORE INFORMATION  If you do not already have a "[email or web address] account, sign up at: [email or web address]  You can access your medical information, make appointments, see lab results, billing information, and more.  If you have questions regarding this referral, please contact  the [facility] Referrals department at:             [phone]. Monday - Friday 8:00AM - 5:00PM.     Sincerely,    [facility]

## 2025-07-29 NOTE — PROGRESS NOTES
Telemetry Shift Summary    Rhythm SB-SR  HR Range 55-72  Ectopy none  Measurements 0.15/0.09/0.41        Normal Values  Rhythm SR  HR Range    Measurements 0.12-0.20 / 0.06-0.10  / 0.30-0.52

## 2025-07-29 NOTE — CARE PLAN
The patient is Stable - Low risk of patient condition declining or worsening    Shift Goals  Clinical Goals: monitor labs  Patient Goals: Rest  Family Goals: Updates    Progress made toward(s) clinical / shift goals:      Problem: Knowledge Deficit - Standard  Goal: Patient and family/care givers will demonstrate understanding of plan of care, disease process/condition, diagnostic tests and medications  Outcome: Progressing   Plan of care d/w patient.    Patient is not progressing towards the following goals:

## 2025-07-29 NOTE — PROGRESS NOTES
Report given to VERENICE Olmedo on CNU. Pt transferred to [facility] via transport. All belongings sent with pt, pt on 2L NC. VSS.

## 2025-07-29 NOTE — PROGRESS NOTES
Nephrology Daily Progress Note    Date of Service  7/29/2025    Chief Complaint  40 y.o. female admitted 7/24/2025 with hematuria, found to have TACOS    Interval Problem Update  Patient has no chest pain or shortness of breath  7/27 patient has no new complaints  7/28 -doing better, no acute events, no new complaints  Serology: low C4, MPO, PR3 pending  Creat level improving  Good UOP  7/29 -no acute events  Doing well, no complaints  Good UOP  Creat level slightly worse  Elevated BP  Review of Systems  Review of Systems   Constitutional:  Negative for chills, fever, malaise/fatigue and weight loss.   HENT:  Negative for congestion, hearing loss and sinus pain.    Eyes: Negative.    Respiratory:  Negative for cough, hemoptysis, shortness of breath and wheezing.    Cardiovascular:  Negative for chest pain, palpitations, orthopnea and leg swelling.   Gastrointestinal:  Negative for abdominal pain, diarrhea, nausea and vomiting.   Genitourinary:  Negative for dysuria, flank pain, frequency, hematuria and urgency.   Skin: Negative.    All other systems reviewed and are negative.       Physical Exam  Temp:  [36 °C (96.8 °F)-37 °C (98.6 °F)] 36.8 °C (98.2 °F)  Pulse:  [52-64] 52  Resp:  [12-16] 14  BP: (109-144)/() 144/98  SpO2:  [96 %-99 %] 98 %    Physical Exam  Vitals reviewed.   Constitutional:       General: She is not in acute distress.     Appearance: Normal appearance. She is well-developed. She is not diaphoretic.   HENT:      Head: Normocephalic and atraumatic.      Nose: Nose normal.      Mouth/Throat:      Mouth: Mucous membranes are moist.      Pharynx: Oropharynx is clear.   Eyes:      Extraocular Movements: Extraocular movements intact.      Conjunctiva/sclera: Conjunctivae normal.      Pupils: Pupils are equal, round, and reactive to light.   Cardiovascular:      Rate and Rhythm: Normal rate and regular rhythm.      Pulses: Normal pulses.      Heart sounds: Normal heart sounds.   Pulmonary:       "Effort: Pulmonary effort is normal. No respiratory distress.      Breath sounds: Normal breath sounds. No wheezing or rales.   Abdominal:      General: Bowel sounds are normal. There is no distension.      Palpations: Abdomen is soft. There is no mass.      Tenderness: There is no abdominal tenderness. There is no right CVA tenderness or left CVA tenderness.   Musculoskeletal:      Cervical back: Normal range of motion and neck supple.      Right lower leg: No edema.      Left lower leg: No edema.   Skin:     General: Skin is warm.      Coloration: Skin is not pale.      Findings: No erythema or rash.   Neurological:      General: No focal deficit present.      Mental Status: She is alert and oriented to person, place, and time.      Cranial Nerves: No cranial nerve deficit.      Coordination: Coordination normal.   Psychiatric:         Mood and Affect: Mood normal.         Behavior: Behavior normal.         Thought Content: Thought content normal.         Judgment: Judgment normal.         Fluids    Intake/Output Summary (Last 24 hours) at 7/29/2025 1315  Last data filed at 7/29/2025 0546  Gross per 24 hour   Intake 918 ml   Output 2050 ml   Net -1132 ml       Laboratory  Recent Labs     07/27/25  0108 07/28/25  0200 07/28/25  1811 07/29/25  0035   WBC 6.4 6.3  --  6.4   RBC 4.69 4.99  --  5.13   HEMOGLOBIN 10.4* 11.2*  --  11.4*   HEMATOCRIT 33.6* 36.4*  --  36.9*   MCV 71.6* 72.9*  --  71.9*   MCH 22.2* 22.4*  --  22.2*   MCHC 31.0* 30.8*  --  30.9*   RDW 54.3* 55.6*  --  55.2*   PLATELETCT 135* 139* 197 183     Recent Labs     07/27/25  0108 07/28/25  0200 07/29/25  0035   SODIUM 139 137 138   POTASSIUM 3.8 3.8 3.9   CHLORIDE 101 101 100   CO2 25 22 23   GLUCOSE 107* 90 117*   BUN 45* 41* 46*   CREATININE 4.52* 4.28* 4.75*   CALCIUM 8.4* 8.4* 8.5     Recent Labs     07/28/25  0200 07/28/25  1811   INR 0.92 0.99     No results for input(s): \"NTPROBNP\" in the last 72 hours.          Imaging  CT-CTA HEAD WITH & " W/O-POST PROCESS   Final Result         1.  No large vessel occlusion or aneurysm identified      CT-ABDOMEN-PELVIS WITH   Final Result         1.  Hazy right perinephric fat stranding, consider component of pyelonephritis.   2.  Hepatomegaly.   3.  Mild intrahepatic and extrahepatic biliary ductal dilatation, consider causes of biliary obstruction with additional workup as clinically appropriate.            DX-CHEST-PORTABLE (1 VIEW)   Final Result      No acute cardiopulmonary disease.            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  Renal dose all meds  Avoid nephrotoxins like NSAIDs.  Prognosis guarded.    discussed with Dr. Jodee Best, Dr.Natalie Best

## 2025-07-29 NOTE — CARE PLAN
The patient is Stable - Low risk of patient condition declining or worsening    Shift Goals  Clinical Goals: monitor labs, hemodynamic stability  Patient Goals: Rest  Family Goals: Updates    Progress made toward(s) clinical / shift goals:      Problem: Pain - Standard  Goal: Alleviation of pain or a reduction in pain to the patient’s comfort goal  Outcome: Progressing     Problem: Knowledge Deficit - Standard  Goal: Patient and family/care givers will demonstrate understanding of plan of care, disease process/condition, diagnostic tests and medications  Outcome: Progressing     Problem: Psychosocial Needs:  Goal: Ability to cope will improve  Outcome: Progressing     Problem: Urinary Elimination:  Goal: Ability to achieve and maintain adequate renal perfusion and functioning will improve  Outcome: Progressing  Goal: Ability to achieve a balanced intake and output will improve  Outcome: Progressing     Problem: Physical Regulation:  Goal: Diagnostic test results will improve  Outcome: Progressing     Problem: Knowledge Deficit:  Goal: Patient's knowledge of the prescribed therapeutic regimen will improve  Outcome: Progressing

## 2025-07-30 PROBLEM — I45.5 SINUS PAUSE: Status: ACTIVE | Noted: 2025-07-30

## 2025-07-30 ASSESSMENT — ENCOUNTER SYMPTOMS
SINUS PAIN: 0
NAUSEA: 0
EYES NEGATIVE: 1
CHILLS: 0
WEIGHT LOSS: 0
HEMOPTYSIS: 0
PALPITATIONS: 0
FLANK PAIN: 0
ORTHOPNEA: 0
ABDOMINAL PAIN: 0
WHEEZING: 0
DIARRHEA: 0
COUGH: 0
FEVER: 0
VOMITING: 0
SHORTNESS OF BREATH: 0

## 2025-07-30 ASSESSMENT — FIBROSIS 4 INDEX: FIB4 SCORE: 1.42

## 2025-07-30 NOTE — PROGRESS NOTES
Reviewed chart including vitals and labs.  Kidney function improved today.  Additional workup underway.  See progress note from 7/29 for full assessment and plan.    ID will continue to closely monitor    Flora Best MD

## 2025-07-30 NOTE — CARE PLAN
The patient is Stable - Low risk of patient condition declining or worsening    Shift Goals  Clinical Goals: monitor labs, VSS, BP management  Patient Goals: labs, scans  Family Goals: not present    Progress made toward(s) clinical / shift goals:    Problem: Pain - Standard  Goal: Alleviation of pain or a reduction in pain to the patient’s comfort goal  Outcome: Progressing     Problem: Knowledge Deficit - Standard  Goal: Patient and family/care givers will demonstrate understanding of plan of care, disease process/condition, diagnostic tests and medications  Outcome: Progressing     Problem: Psychosocial Needs:  Goal: Ability to cope will improve  Outcome: Progressing     Problem: Urinary Elimination:  Goal: Ability to achieve and maintain adequate renal perfusion and functioning will improve  Outcome: Progressing  Goal: Ability to achieve a balanced intake and output will improve  Outcome: Progressing     Problem: Physical Regulation:  Goal: Diagnostic test results will improve  Outcome: Progressing     Problem: Knowledge Deficit:  Goal: Patient's knowledge of the prescribed therapeutic regimen will improve  Outcome: Progressing

## 2025-07-30 NOTE — ASSESSMENT & PLAN NOTE
4.6 second pause on tele monitor; no additional pauses noted  Secondary to BB therapy and component of electrolyte abnormality from TACOS  - Hold coreg  - Continue tele monitoring  - If ongoing events occur, plan for EP consult

## 2025-07-30 NOTE — PROGRESS NOTES
Hospital Medicine Daily Progress Note    Date of Service  7/30/2025    Chief Complaint  Lissette Lee is a 40 y.o. female admitted 7/24/2025 with hematuria    Hospital Course  Lissette Lee is a 40-year-old female with PMHx HTN, endometriosis s/p hysterectomy, solitary kidney.  Admitted 7/24 for hematuria.    For history: Patient has been having hematuria over 2 weeks prior to admission.    In the ED: Patient was noted to have an TACOS.  Creatinine 2.77 on admission.  UA notable for gross hematuria.  CT A/P: Hazy right perinephritic fat stranding, consider component of pyelonephritis.  Mild intrahepatic and extrahepatic biliary duct dilation.  Urine cultures positive for Gardnerella.    Urology was consulted and recommended outpatient cystoscopy.  Nephrology was consulted.  Patient was found to be hepatitis C positive.  Infectious disease was consulted   For possible inpatient treatment of hepatitis C.    Nephrology not recommending kidney biopsy given solitary kidney.  Infectious disease recommending outpatient treatment for hepatitis C.  Difficult to initiate while inpatient as antivirals are not on formulary and generally require a 3-month treatment course.    Interval Problem Update  7/29: Vitals notable for SBP ranging 111 through 139.  WBC stable at 6.  Hb stable at 11.  Creatinine 4.75 from 4.28. Discussed with Dr. Ramos, nephrology.  Cryoglobulin workup pending.  Continue to hold off on renal biopsy given solitary kidney.  Start DVT prophylaxis with heparin.    7/30: Vitals notable for SBP ranging 112 through 144 overnight.  Creatinine 4.44 from 4.75 yesterday.  Phosphorus 6.4 from 5.6.  Cryoglobulin results are pending. Patient had a 4.6 second pause on tele monitor. She was asymptomatic at the time. Hold coreg.     I have discussed this patient's plan of care and discharge plan at IDT rounds today with Case Management, Nursing, Nursing leadership, and other members of the IDT  team.    Consultants/Specialty  infectious disease, nephrology, and urology    Code Status  Full Code    Disposition  The patient is not medically cleared for discharge to home or a post-acute facility.      I have placed the appropriate orders for post-discharge needs.    Review of Systems  Review of Systems   Constitutional:  Negative for fever and malaise/fatigue.   Respiratory:  Negative for shortness of breath.    Cardiovascular:  Negative for chest pain and leg swelling.   Gastrointestinal:  Negative for abdominal pain, nausea and vomiting.        Physical Exam  Temp:  [36.1 °C (97 °F)-37.2 °C (98.9 °F)] 36.1 °C (97 °F)  Pulse:  [52-71] 58  Resp:  [14-16] 16  BP: (112-150)/(71-98) 136/90  SpO2:  [95 %-99 %] 97 %    Physical Exam  Vitals and nursing note reviewed.   Constitutional:       General: She is not in acute distress.     Appearance: Normal appearance. She is ill-appearing.   Cardiovascular:      Rate and Rhythm: Normal rate and regular rhythm.   Pulmonary:      Effort: Pulmonary effort is normal.      Breath sounds: Normal breath sounds.   Musculoskeletal:         General: No swelling.   Skin:     General: Skin is warm and dry.      Coloration: Skin is pale.   Neurological:      Mental Status: She is alert and oriented to person, place, and time.   Psychiatric:         Mood and Affect: Mood normal.         Behavior: Behavior normal.         Fluids    Intake/Output Summary (Last 24 hours) at 7/30/2025 1023  Last data filed at 7/30/2025 0400  Gross per 24 hour   Intake 0 ml   Output 850 ml   Net -850 ml        Laboratory  Recent Labs     07/28/25  0200 07/28/25  1811 07/29/25  0035   WBC 6.3  --  6.4   RBC 4.99  --  5.13   HEMOGLOBIN 11.2*  --  11.4*   HEMATOCRIT 36.4*  --  36.9*   MCV 72.9*  --  71.9*   MCH 22.4*  --  22.2*   MCHC 30.8*  --  30.9*   RDW 55.6*  --  55.2*   PLATELETCT 139* 197 183     Recent Labs     07/28/25  0200 07/29/25  0035 07/30/25  0144   SODIUM 137 138 137   POTASSIUM 3.8 3.9 3.9    CHLORIDE 101 100 103   CO2 22 23 21   GLUCOSE 90 117* 107*   BUN 41* 46* 53*   CREATININE 4.28* 4.75* 4.44*   CALCIUM 8.4* 8.5 8.5     Recent Labs     07/28/25  0200 07/28/25  1811   INR 0.92 0.99               Imaging  CT-CTA HEAD WITH & W/O-POST PROCESS   Final Result         1.  No large vessel occlusion or aneurysm identified      CT-ABDOMEN-PELVIS WITH   Final Result         1.  Hazy right perinephric fat stranding, consider component of pyelonephritis.   2.  Hepatomegaly.   3.  Mild intrahepatic and extrahepatic biliary ductal dilatation, consider causes of biliary obstruction with additional workup as clinically appropriate.            DX-CHEST-PORTABLE (1 VIEW)   Final Result      No acute cardiopulmonary disease.           Assessment/Plan  * TACOS (acute kidney injury) (HCC)- (present on admission)  Assessment & Plan  Broad differential; currently including RPGN, contrast-induced nephropathy and hepatitis C induced nephropathy  - Infectious disease consulted; recommending outpatient treatment for hepatitis C  - Nephrology following  - Deferring kidney biopsy at this time given solitary kidney  - Avoid nephrotoxins; renally dose medications  - Strict I's and O's  - Repeat BMP in a.m.  - Cryoglobulin workup pending  - Continuous telemetry monitoring; monitoring for arrhythmia in the setting of TACOS and electrolyte abnormalities    Sinus pause  Assessment & Plan  4.6 second pause on tele monitor  Secondary to BB therapy and component of electrolyte abnormality from TACOS  - Hold coreg  - Continue tele monitoring  - If ongoing events occur, plan for EP consult    Acute hepatitis C virus infection  Assessment & Plan  Hepatitis C antibody positive  HCV quant greater than 1 million  - Planning for outpatient hepatitis C treatment  - Infectious disease consulted previously; will follow-up in clinic    Amphetamine abuse (HCC)  Assessment & Plan  UDS positive amphetamines    Headache  Assessment & Plan  Probably  secondary to uncontrolled hypertension.  CT a of the head with and without contrast showed no acute abnormalities  Improved  - Discontinue neurochecks every 4 hours  - Pain medication as needed    Hypertensive urgency  Assessment & Plan  Blood pressure at urgent care is as high as 240/170, improved after IV hydralazine  -Continue clonidine 0.2 mg twice daily  - Continue hydralazine 75 mg every 8 hours  - Hold coreg due to bradycardia and sinus pause     Elevated troponin  Assessment & Plan  Troponin 55, proBNP 34,514.  Suspected cardiomyopathy, perhaps hypertensive in etiology  Recommend outpatient echocardiogram    Hematuria  Assessment & Plan  Hematuria with blood clots on and off in the last 2 weeks  Urology consulted  Outpatient cystoscopy recommended    Pyelonephritis  Assessment & Plan  Reported mild right CVA tenderness.  UA positive for bacteria, leukocyte esterase, WBC elevation  CT of the abdomen showed possible pyelonephritis  Urine culture - gardnerella vaginalis   -Continue Flagyl; 7 day course. End date 8/3    Hypokalemia  Assessment & Plan  Potassium stable at 3.9  - Repeat BMP in AM         VTE prophylaxis:   SCDs/TEDs   heparin ppx      I have performed a physical exam and reviewed and updated ROS and Plan today (7/30/2025). In review of yesterday's note (7/29/2025), there are no changes except as documented above.

## 2025-07-30 NOTE — PROGRESS NOTES
Nephrology Daily Progress Note    Date of Service  7/30/2025    Chief Complaint  40 y.o. female admitted 7/24/2025 with hematuria, found to have TACOS    Interval Problem Update  Patient has no chest pain or shortness of breath  7/27 patient has no new complaints  7/28 -doing better, no acute events, no new complaints  Serology: low C4, MPO, PR3 pending  Creat level improving  Good UOP  7/29 -no acute events  Doing well, no complaints  Good UOP  Creat level slightly worse  Elevated BP   Repeated prot/creat ration with sig improvement at 1.0 gm  7/30 -doing well, no complaints  Creat level improving  Review of Systems  Review of Systems   Constitutional:  Negative for chills, fever, malaise/fatigue and weight loss.   HENT:  Negative for congestion, hearing loss and sinus pain.    Eyes: Negative.    Respiratory:  Negative for cough, hemoptysis, shortness of breath and wheezing.    Cardiovascular:  Negative for chest pain, palpitations, orthopnea and leg swelling.   Gastrointestinal:  Negative for abdominal pain, diarrhea, nausea and vomiting.   Genitourinary:  Negative for dysuria, flank pain, frequency, hematuria and urgency.   Skin: Negative.    All other systems reviewed and are negative.       Physical Exam  Temp:  [36.1 °C (97 °F)-37.2 °C (98.9 °F)] 36.7 °C (98 °F)  Pulse:  [58-71] 61  Resp:  [15-16] 15  BP: (112-168)/() 159/100  SpO2:  [95 %-99 %] 96 %    Physical Exam  Vitals reviewed.   Constitutional:       General: She is not in acute distress.     Appearance: Normal appearance. She is well-developed. She is not diaphoretic.   HENT:      Head: Normocephalic and atraumatic.      Nose: Nose normal.      Mouth/Throat:      Mouth: Mucous membranes are moist.      Pharynx: Oropharynx is clear.   Eyes:      Extraocular Movements: Extraocular movements intact.      Conjunctiva/sclera: Conjunctivae normal.      Pupils: Pupils are equal, round, and reactive to light.   Cardiovascular:      Rate and Rhythm: Normal  "rate and regular rhythm.      Pulses: Normal pulses.      Heart sounds: Normal heart sounds.   Pulmonary:      Effort: Pulmonary effort is normal. No respiratory distress.      Breath sounds: Normal breath sounds. No wheezing or rales.   Abdominal:      General: Bowel sounds are normal. There is no distension.      Palpations: Abdomen is soft. There is no mass.      Tenderness: There is no abdominal tenderness. There is no right CVA tenderness or left CVA tenderness.   Musculoskeletal:      Cervical back: Normal range of motion and neck supple.      Right lower leg: No edema.      Left lower leg: No edema.   Skin:     General: Skin is warm.      Coloration: Skin is not pale.      Findings: No erythema or rash.   Neurological:      General: No focal deficit present.      Mental Status: She is alert and oriented to person, place, and time.      Cranial Nerves: No cranial nerve deficit.      Coordination: Coordination normal.   Psychiatric:         Mood and Affect: Mood normal.         Behavior: Behavior normal.         Thought Content: Thought content normal.         Judgment: Judgment normal.         Fluids    Intake/Output Summary (Last 24 hours) at 7/30/2025 1533  Last data filed at 7/30/2025 0400  Gross per 24 hour   Intake 0 ml   Output 850 ml   Net -850 ml       Laboratory  Recent Labs     07/28/25  0200 07/28/25  1811 07/29/25  0035   WBC 6.3  --  6.4   RBC 4.99  --  5.13   HEMOGLOBIN 11.2*  --  11.4*   HEMATOCRIT 36.4*  --  36.9*   MCV 72.9*  --  71.9*   MCH 22.4*  --  22.2*   MCHC 30.8*  --  30.9*   RDW 55.6*  --  55.2*   PLATELETCT 139* 197 183     Recent Labs     07/28/25  0200 07/29/25  0035 07/30/25  0144   SODIUM 137 138 137   POTASSIUM 3.8 3.9 3.9   CHLORIDE 101 100 103   CO2 22 23 21   GLUCOSE 90 117* 107*   BUN 41* 46* 53*   CREATININE 4.28* 4.75* 4.44*   CALCIUM 8.4* 8.5 8.5     Recent Labs     07/28/25  0200 07/28/25  1811   INR 0.92 0.99     No results for input(s): \"NTPROBNP\" in the last 72 " hours.          Imaging  CT-CTA HEAD WITH & W/O-POST PROCESS   Final Result         1.  No large vessel occlusion or aneurysm identified      CT-ABDOMEN-PELVIS WITH   Final Result         1.  Hazy right perinephric fat stranding, consider component of pyelonephritis.   2.  Hepatomegaly.   3.  Mild intrahepatic and extrahepatic biliary ductal dilatation, consider causes of biliary obstruction with additional workup as clinically appropriate.            DX-CHEST-PORTABLE (1 VIEW)   Final Result      No acute cardiopulmonary disease.            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 Electrolytes : elevated Phos level -renal diet, Phoslo with meals TID      Recs:    no acute need for HD, continue to evaluate daily  Await cryoglobuline results  appreciate ID recommendations   Renal diet  Daily BMP, CBC.  Renal dose all meds  Avoid nephrotoxins like NSAIDs.  Phoslo 667 mg TID  Prognosis guarded.    discussed with Dr. Jodee Best

## 2025-07-30 NOTE — CARE PLAN
The patient is Stable - Low risk of patient condition declining or worsening    Shift Goals  Clinical Goals: monitor labs, VSS, BP management  Patient Goals: labs, scans  Family Goals: not present    Progress made toward(s) clinical / shift goals:    Problem: Knowledge Deficit - Standard  Goal: Patient and family/care givers will demonstrate understanding of plan of care, disease process/condition, diagnostic tests and medications  Outcome: Progressing     Problem: Urinary Elimination:  Goal: Ability to achieve and maintain adequate renal perfusion and functioning will improve  Outcome: Progressing     Problem: Knowledge Deficit:  Goal: Patient's knowledge of the prescribed therapeutic regimen will improve  Outcome: Progressing

## 2025-07-30 NOTE — PROGRESS NOTES
Telemetry Shift Summary    Rhythm SB-SR  HR Range 40s-60s  Ectopy rare pvcs  Measurements 0.15/0.10/0.38        Normal Values  Rhythm SR  HR Range    Measurements 0.12-0.20 / 0.06-0.10  / 0.30-0.52

## 2025-07-31 ASSESSMENT — ENCOUNTER SYMPTOMS
SINUS PAIN: 0
SHORTNESS OF BREATH: 0
FLANK PAIN: 0
DIARRHEA: 0
CHILLS: 0
WEAKNESS: 0
WHEEZING: 0
WEIGHT LOSS: 0
PALPITATIONS: 0
HEMOPTYSIS: 0
FEVER: 0
COUGH: 0
BLOOD IN STOOL: 0
EYES NEGATIVE: 1
VOMITING: 0
NAUSEA: 0
CONSTIPATION: 0
ORTHOPNEA: 0
ABDOMINAL PAIN: 0
NERVOUS/ANXIOUS: 0
MYALGIAS: 0

## 2025-07-31 ASSESSMENT — FIBROSIS 4 INDEX: FIB4 SCORE: 1.42

## 2025-07-31 NOTE — PROGRESS NOTES
[facility] Medicine Daily Progress Note    Date of Service  7/31/2025    Chief Complaint  Lissette Lee is a 40 y.o. female admitted 7/24/2025 with hematuria    Hospital Course  Lissette Lee is a 40-year-old female with PMHx HTN, endometriosis s/p hysterectomy, solitary kidney.  Admitted 7/24 for hematuria.    For history: Patient has been having hematuria over 2 weeks prior to admission.    In the ED: Patient was noted to have an TACOS.  Creatinine 2.77 on admission.  UA notable for gross hematuria.  CT A/P: Hazy right perinephritic fat stranding, consider component of pyelonephritis.  Mild intrahepatic and extrahepatic biliary duct dilation.  Urine cultures positive for Gardnerella.    Urology was consulted and recommended outpatient cystoscopy.  Nephrology was consulted.  Patient was found to be hepatitis C positive.  Infectious disease was consulted   For possible inpatient treatment of hepatitis C.    Nephrology not recommending kidney biopsy given solitary kidney.  Infectious disease recommending outpatient treatment for hepatitis C.  Difficult to initiate while inpatient as antivirals are not on formulary and generally require a 3-month treatment course.    Interval Problem Update  7/29: Vitals notable for SBP ranging 111 through 139.  WBC stable at 6.  Hb stable at 11.  Creatinine 4.75 from 4.28. Discussed with Dr. Ramos, nephrology.  Cryoglobulin workup pending.  Continue to hold off on renal biopsy given solitary kidney.  Start DVT prophylaxis with heparin.    7/30: Vitals notable for SBP ranging 112 through 144 overnight.  Creatinine 4.44 from 4.75 yesterday.  Phosphorus 6.4 from 5.6.  Cryoglobulin results are pending. Patient had a 4.6 second pause on tele monitor. She was asymptomatic at the time. Hold coreg.     7/31: Vitals stable overnight.   through 147.  No additional sinus pauses noted overnight.  Creatinine 4.49 from 4.44.  Cryoglobulins are pending.  Discussed with infectious disease,  Dr. Best.  Awaiting cryoglobulin results for safe discharge planning.    I have discussed this patient's plan of care and discharge plan at IDT rounds today with Case Management, Nursing, Nursing leadership, and other members of the IDT team.    Consultants/Specialty  infectious disease, nephrology, and urology    Code Status  Full Code    Disposition  The patient is not medically cleared for discharge to home or a post-acute facility.  Anticipate discharge to: home with close outpatient follow-up    I have placed the appropriate orders for post-discharge needs.    Review of Systems  Review of Systems   Constitutional:  Negative for fever and malaise/fatigue.   Respiratory:  Negative for shortness of breath.    Cardiovascular:  Negative for chest pain and leg swelling.   Gastrointestinal:  Negative for abdominal pain, nausea and vomiting.        Physical Exam  Temp:  [36.1 °C (96.9 °F)-36.8 °C (98.2 °F)] 36.4 °C (97.5 °F)  Pulse:  [61-71] 69  Resp:  [16-18] 18  BP: (112-147)/() 128/85  SpO2:  [95 %-98 %] 95 %    Physical Exam  Vitals and nursing note reviewed.   Constitutional:       General: She is not in acute distress.     Appearance: Normal appearance. She is ill-appearing.   Cardiovascular:      Rate and Rhythm: Normal rate and regular rhythm.   Pulmonary:      Effort: Pulmonary effort is normal.      Breath sounds: Normal breath sounds.   Musculoskeletal:         General: No swelling.   Skin:     General: Skin is warm and dry.      Coloration: Skin is pale.   Neurological:      Mental Status: She is alert and oriented to person, place, and time.   Psychiatric:         Mood and Affect: Mood normal.         Behavior: Behavior normal.         Fluids    Intake/Output Summary (Last 24 hours) at 7/31/2025 1400  Last data filed at 7/31/2025 0500  Gross per 24 hour   Intake 500 ml   Output 750 ml   Net -250 ml        Laboratory  Recent Labs     07/28/25  1811 07/29/25  0035   WBC  --  6.4   RBC  --  5.13    HEMOGLOBIN  --  11.4*   HEMATOCRIT  --  36.9*   MCV  --  71.9*   MCH  --  22.2*   MCHC  --  30.9*   RDW  --  55.2*   PLATELETCT 197 183     Recent Labs     07/29/25  0035 07/30/25  0144 07/31/25  0304   SODIUM 138 137 136   POTASSIUM 3.9 3.9 3.9   CHLORIDE 100 103 103   CO2 23 21 20   GLUCOSE 117* 107* 100*   BUN 46* 53* 52*   CREATININE 4.75* 4.44* 4.49*   CALCIUM 8.5 8.5 8.5     Recent Labs     07/28/25  1811   INR 0.99               Imaging  CT-CTA HEAD WITH & W/O-POST PROCESS   Final Result         1.  No large vessel occlusion or aneurysm identified      CT-ABDOMEN-PELVIS WITH   Final Result         1.  Hazy right perinephric fat stranding, consider component of pyelonephritis.   2.  Hepatomegaly.   3.  Mild intrahepatic and extrahepatic biliary ductal dilatation, consider causes of biliary obstruction with additional workup as clinically appropriate.            DX-CHEST-PORTABLE (1 VIEW)   Final Result      No acute cardiopulmonary disease.           Assessment/Plan  * TACOS (acute kidney injury) (HCC)- (present on admission)  Assessment & Plan  Broad differential; currently including RPGN, contrast-induced nephropathy and hepatitis C induced nephropathy  - Infectious disease consulted; recommending outpatient treatment for hepatitis C  - Nephrology following  - Deferring kidney biopsy at this time given solitary kidney  - Avoid nephrotoxins; renally dose medications  - Strict I's and O's  - Repeat BMP in a.m.  - Cryoglobulin workup pending  - Continuous telemetry monitoring; monitoring for arrhythmia in the setting of TACOS and electrolyte abnormalities    Sinus pause  Assessment & Plan  4.6 second pause on tele monitor; no additional pauses noted  Secondary to BB therapy and component of electrolyte abnormality from TACOS  - Hold coreg  - Continue tele monitoring  - If ongoing events occur, plan for EP consult    Acute hepatitis C virus infection  Assessment & Plan  Hepatitis C antibody positive  HCV quant  greater than 1 million  - Planning for outpatient hepatitis C treatment  - Infectious disease consulted previously; will follow-up in clinic    Amphetamine abuse (HCC)  Assessment & Plan  UDS positive amphetamines    Headache  Assessment & Plan  Probably secondary to uncontrolled hypertension.  CT a of the head with and without contrast showed no acute abnormalities  Improved  - Discontinue neurochecks every 4 hours  - Pain medication as needed    Hypertensive urgency  Assessment & Plan  Blood pressure at urgent care is as high as 240/170, improved after IV hydralazine  -Continue clonidine 0.2 mg twice daily  - Continue hydralazine 75 mg every 8 hours  - Hold coreg due to bradycardia and sinus pause     Elevated troponin  Assessment & Plan  Troponin 55, proBNP 34,514.  Suspected cardiomyopathy, perhaps hypertensive in etiology  Recommend outpatient echocardiogram    Hematuria  Assessment & Plan  Hematuria with blood clots on and off in the last 2 weeks  Urology consulted  Outpatient cystoscopy recommended    Pyelonephritis  Assessment & Plan  Reported mild right CVA tenderness.  UA positive for bacteria, leukocyte esterase, WBC elevation  CT of the abdomen showed possible pyelonephritis  Urine culture - gardnerella vaginalis   -Continue Flagyl; 7 day course. End date 8/3    Hypokalemia  Assessment & Plan  Potassium stable at 3.9  - Repeat BMP in AM         VTE prophylaxis:   SCDs/TEDs   heparin ppx      I have performed a physical exam and reviewed and updated ROS and Plan today (7/31/2025). In review of yesterday's note (7/30/2025), there are no changes except as documented above.

## 2025-07-31 NOTE — CARE PLAN
Problem: Pain - Standard  Goal: Alleviation of pain or a reduction in pain to the patient’s comfort goal  Outcome: Progressing     Problem: Knowledge Deficit - Standard  Goal: Patient and family/care givers will demonstrate understanding of plan of care, disease process/condition, diagnostic tests and medications  Outcome: Progressing     Problem: Psychosocial Needs:  Goal: Ability to cope will improve  Outcome: Progressing   The patient is Stable - Low risk of patient condition declining or worsening    Shift Goals  Clinical Goals: monitor labs, vss  Patient Goals: rest comfort updates  Family Goals: berry    Progress made toward(s) clinical / shift goals:       Patient is not progressing towards the following goals:

## 2025-07-31 NOTE — PROGRESS NOTES
Monitor Summary  Rhythm: SB/SR   4.6 sec arrest  Rate: 55-72  Ectopy: n/a  Measurements: .14/.08/.43

## 2025-07-31 NOTE — CARE PLAN
The patient is Stable - Low risk of patient condition declining or worsening    Shift Goals  Clinical Goals: safety, VSS, labs  Patient Goals: rest  Family Goals: berry        Problem: Knowledge Deficit - Standard  Goal: Patient and family/care givers will demonstrate understanding of plan of care, disease process/condition, diagnostic tests and medications  Outcome: Progressing     Problem: Pain - Standard  Goal: Alleviation of pain or a reduction in pain to the patient’s comfort goal  Outcome: Progressing     Problem: Psychosocial Needs:  Goal: Ability to cope will improve  Outcome: Progressing     Problem: Urinary Elimination:  Goal: Ability to achieve and maintain adequate renal perfusion and functioning will improve  Outcome: Progressing  Goal: Ability to achieve a balanced intake and output will improve  Outcome: Progressing     Problem: Physical Regulation:  Goal: Diagnostic test results will improve  Outcome: Progressing

## 2025-07-31 NOTE — PROGRESS NOTES
Nephrology Daily Progress Note    Date of Service  7/31/2025    Chief Complaint  40 y.o. female admitted 7/24/2025 with hematuria, found to have TACOS    Interval Problem Update  Patient has no chest pain or shortness of breath  7/27 patient has no new complaints  7/28 -doing better, no acute events, no new complaints  Serology: low C4, MPO, PR3 pending  Creat level improving  Good UOP  7/29 -no acute events  Doing well, no complaints  Good UOP  Creat level slightly worse  Elevated BP   Repeated prot/creat ration with sig improvement at 1.0 gm  7/30 -doing well, no complaints  Creat level improving  7/31 -doing well  No complaints  No acute events  Elevated BP improved under better control  Review of Systems  Review of Systems   Constitutional:  Negative for chills, fever, malaise/fatigue and weight loss.   HENT:  Negative for congestion, hearing loss and sinus pain.    Eyes: Negative.    Respiratory:  Negative for cough, hemoptysis, shortness of breath and wheezing.    Cardiovascular:  Negative for chest pain, palpitations, orthopnea and leg swelling.   Gastrointestinal:  Negative for abdominal pain, diarrhea, nausea and vomiting.   Genitourinary:  Negative for dysuria, flank pain, frequency, hematuria and urgency.   Skin: Negative.    All other systems reviewed and are negative.       Physical Exam  Temp:  [36.1 °C (96.9 °F)-36.8 °C (98.2 °F)] 36.4 °C (97.5 °F)  Pulse:  [61-71] 69  Resp:  [16-18] 18  BP: (112-147)/() 128/85  SpO2:  [95 %-98 %] 95 %    Physical Exam  Vitals reviewed.   Constitutional:       General: She is not in acute distress.     Appearance: Normal appearance. She is well-developed. She is not diaphoretic.   HENT:      Head: Normocephalic and atraumatic.      Nose: Nose normal.      Mouth/Throat:      Mouth: Mucous membranes are moist.      Pharynx: Oropharynx is clear.   Eyes:      Extraocular Movements: Extraocular movements intact.      Conjunctiva/sclera: Conjunctivae normal.       "Pupils: Pupils are equal, round, and reactive to light.   Cardiovascular:      Rate and Rhythm: Normal rate and regular rhythm.      Pulses: Normal pulses.      Heart sounds: Normal heart sounds.   Pulmonary:      Effort: Pulmonary effort is normal. No respiratory distress.      Breath sounds: Normal breath sounds. No wheezing or rales.   Abdominal:      General: Bowel sounds are normal. There is no distension.      Palpations: Abdomen is soft. There is no mass.      Tenderness: There is no abdominal tenderness. There is no right CVA tenderness or left CVA tenderness.   Musculoskeletal:      Cervical back: Normal range of motion and neck supple.      Right lower leg: No edema.      Left lower leg: No edema.   Skin:     General: Skin is warm.      Coloration: Skin is not pale.      Findings: No erythema or rash.   Neurological:      General: No focal deficit present.      Mental Status: She is alert and oriented to person, place, and time.      Cranial Nerves: No cranial nerve deficit.      Coordination: Coordination normal.   Psychiatric:         Mood and Affect: Mood normal.         Behavior: Behavior normal.         Thought Content: Thought content normal.         Judgment: Judgment normal.         Fluids    Intake/Output Summary (Last 24 hours) at 7/31/2025 1321  Last data filed at 7/31/2025 0500  Gross per 24 hour   Intake 500 ml   Output 750 ml   Net -250 ml       Laboratory  Recent Labs     07/28/25  1811 07/29/25  0035   WBC  --  6.4   RBC  --  5.13   HEMOGLOBIN  --  11.4*   HEMATOCRIT  --  36.9*   MCV  --  71.9*   MCH  --  22.2*   MCHC  --  30.9*   RDW  --  55.2*   PLATELETCT 197 183     Recent Labs     07/29/25  0035 07/30/25  0144 07/31/25  0304   SODIUM 138 137 136   POTASSIUM 3.9 3.9 3.9   CHLORIDE 100 103 103   CO2 23 21 20   GLUCOSE 117* 107* 100*   BUN 46* 53* 52*   CREATININE 4.75* 4.44* 4.49*   CALCIUM 8.5 8.5 8.5     Recent Labs     07/28/25  1811   INR 0.99     No results for input(s): \"NTPROBNP\" " in the last 72 hours.          Imaging  CT-CTA HEAD WITH & W/O-POST PROCESS   Final Result         1.  No large vessel occlusion or aneurysm identified      CT-ABDOMEN-PELVIS WITH   Final Result         1.  Hazy right perinephric fat stranding, consider component of pyelonephritis.   2.  Hepatomegaly.   3.  Mild intrahepatic and extrahepatic biliary ductal dilatation, consider causes of biliary obstruction with additional workup as clinically appropriate.            DX-CHEST-PORTABLE (1 VIEW)   Final Result      No acute cardiopulmonary disease.            Assessment/Plan  1 TACOS: as proteinuria/hematuria improved without treatment etiology likely due hypertensive nephropathy , JENNIFER, less likely Hep C MPGN     Remains good UOP, creat level slightly better -with solitary kidney reluctant for kidney biopsy  2 single functioning kidney  3 Proteinuria :  with sig improvement   4 HTN: elevated  BP   - under better control  5 Anemia: Hb level improving -to monitor  6. Electrolytes : elevated Phos level improving -renal diet, Phoslo with meals TID -to monitor      Recs:    no acute need for HD, continue to evaluate daily  Await cryoglobuline results  appreciate ID recommendations   Renal diet  Daily BMP, CBC.  Renal dose all meds  Avoid nephrotoxins like NSAIDs.  Will follow

## 2025-08-01 ENCOUNTER — PHARMACY VISIT (OUTPATIENT)
Dept: PHARMACY | Facility: MEDICAL CENTER | Age: 40
End: 2025-08-01
Payer: COMMERCIAL

## 2025-08-01 DIAGNOSIS — I10 PRIMARY HYPERTENSION: ICD-10-CM

## 2025-08-01 DIAGNOSIS — R80.9 PROTEINURIA, UNSPECIFIED TYPE: ICD-10-CM

## 2025-08-01 DIAGNOSIS — N17.9 AKI (ACUTE KIDNEY INJURY) (HCC): Primary | ICD-10-CM

## 2025-08-01 PROCEDURE — RXMED WILLOW AMBULATORY MEDICATION CHARGE: Performed by: STUDENT IN AN ORGANIZED HEALTH CARE EDUCATION/TRAINING PROGRAM

## 2025-08-01 ASSESSMENT — FIBROSIS 4 INDEX: FIB4 SCORE: 1.42

## 2025-08-01 NOTE — CARE PLAN
The patient is Stable - Low risk of patient condition declining or worsening    Shift Goals  Clinical Goals: safety, VSS, labs  Patient Goals: rest  Family Goals: berry      Problem: Knowledge Deficit - Standard  Goal: Patient and family/care givers will demonstrate understanding of plan of care, disease process/condition, diagnostic tests and medications  Outcome: Progressing     Problem: Pain - Standard  Goal: Alleviation of pain or a reduction in pain to the patient’s comfort goal  Outcome: Progressing     Problem: Psychosocial Needs:  Goal: Ability to cope will improve  Outcome: Progressing     Problem: Urinary Elimination:  Goal: Ability to achieve and maintain adequate renal perfusion and functioning will improve  Outcome: Progressing     Problem: Physical Regulation:  Goal: Diagnostic test results will improve  Outcome: Progressing

## 2025-08-01 NOTE — PROGRESS NOTES
Bedside report completed, pt sleeping-respirations are regular and non labored. Call light in reach.

## 2025-08-01 NOTE — DISCHARGE SUMMARY
Discharge Summary    CHIEF COMPLAINT ON ADMISSION  Chief Complaint   Patient presents with    Blood in Urine     Pt was sent for protein in her urine that was see today at           Reason for Admission  blood in urine     Admission Date  7/24/2025    CODE STATUS  Full Code    HPI & HOSPITAL COURSE    Lissette Lee is a 40-year-old female with PMHx HTN, endometriosis s/p hysterectomy, solitary kidney.  Admitted 7/24 for hematuria.    For history: Patient has been having hematuria over 2 weeks prior to admission.    In the ED: Patient was noted to have an TACOS.  Creatinine 2.77 on admission.  UA notable for gross hematuria.  CT A/P: Hazy right perinephritic fat stranding, consider component of pyelonephritis.  Mild intrahepatic and extrahepatic biliary duct dilation.  Urine cultures positive for Gardnerella.    Urology was consulted and recommended outpatient cystoscopy.  Nephrology was consulted.  Patient was found to be hepatitis C positive.  Infectious disease was consulted   For possible inpatient treatment of hepatitis C.    Nephrology not recommending kidney biopsy given solitary kidney.  Infectious disease recommending outpatient treatment for hepatitis C.  Difficult to initiate while inpatient as antivirals are not on formulary and generally require a 3-month treatment course.  Patient will follow-up with outpatient infectious disease in the next 1 to 2 weeks.    At time of discharge, creatinine had plateaued and remained stable at 4.13.  Electrolyte abnormalities have corrected.  Cryoglobulin testing is pending.  Patient will follow-up with nephrology in the next 1 week.  The etiology of patient's TACOS and CKD is still pending though considerations include hypertensive urgency, contrast-induced nephropathy and hepatitis C induced glomerulonephritis.  Biopsy was deferred given patient's solitary kidney.    Patient has been started on amlodipine, clonidine, hydralazine for blood pressure control.  I have held  beta-blocker due to 4.6-second sinus pause in the setting of TACOS and CKD.  Medications have been ordered and delivered to bedside at discharge.    Patient will complete Flagyl for Gardnerella UTI on 8/3.    At time of discharge, I had a long conversation with patient regarding methamphetamine cessation and the importance of close follow-up.  In no uncertain terms, I explained that if patient continues to have uncontrolled blood pressure, continues to use methamphetamines or does not follow-up for treatment of hepatitis C-this could result in kidney failure, cardiac arrest and ultimately her untimely death.  Patient states understanding.  She is motivated for her follow-up.  She does not wish to start dialysis.  ACP time spent 19 minutes.    Therefore, she is discharged in good and stable condition to home with close outpatient follow-up.    The patient met 2-midnight criteria for an inpatient stay at the time of discharge.    Discharge Date  8/1/2025    FOLLOW UP ITEMS POST DISCHARGE  Follow-up with infectious disease-as soon as possible  Follow-up with nephrology-1 week    DISCHARGE DIAGNOSES  Principal Problem:    TACOS (acute kidney injury) (HCC) (POA: Yes)  Active Problems:    Hypokalemia (POA: Unknown)    Pyelonephritis (POA: Unknown)    Hematuria (POA: Unknown)    Elevated troponin (POA: Unknown)    Hypertensive urgency (POA: Unknown)    Headache (POA: Unknown)    Amphetamine abuse (HCC) (POA: Unknown)    Acute hepatitis C virus infection (POA: Unknown)    Sinus pause (POA: Unknown)  Resolved Problems:    * No resolved hospital problems. *      FOLLOW UP  No future appointments.  Tasha Ramos M.D.  [address]  [address]  [place] [address]  [phone]    Follow up in 3 day(s)      Flora Best M.D.  [address]  [address]  [place] [address]  [phone]    Follow up in 3 day(s)      Primary Care Physician    Schedule an appointment as soon as possible for a visit in 2 week(s)  establish  care/hospital follow up      MEDICATIONS ON DISCHARGE     Medication List        START taking these medications        Instructions   amLODIPine 5 MG Tabs  Start taking on: August 2, 2025  Commonly known as: Norvasc   Take 2 Tablets by mouth every day.  Dose: 10 mg     cloNIDine 0.2 MG Tabs  Commonly known as: Catapres   Take 1 Tablet by mouth 2 times a day.  Dose: 0.2 mg     hydrALAZINE 100 MG tablet  Commonly known as: Apresoline   Take 1 Tablet by mouth every 8 hours.  Dose: 100 mg     metroNIDAZOLE 500 MG Tabs  Commonly known as: Flagyl   Take 1 Tablet by mouth every 12 hours for 2 days.  Dose: 500 mg            CONTINUE taking these medications        Instructions   asa/apap/caffeine 250-250-65 MG Tabs  Commonly known as: Excedrin   Take 2 Tablets by mouth one time as needed for Headache (pain).  Dose: 2 Tablet            STOP taking these medications      NIFEdipine SR 30 MG tablet  Commonly known as: Procardia-XL              Allergies  Allergies[1]    DIET  Orders Placed This Encounter   Procedures    Diet Order Diet: Renal     Standing Status:   Standing     Number of Occurrences:   1     Diet::   Renal [8]       ACTIVITY  As tolerated.  Weight bearing as tolerated    CONSULTATIONS  Nephrology  Infectious disease  Urology    PROCEDURES  None    LABORATORY  Lab Results   Component Value Date    SODIUM 135 08/01/2025    POTASSIUM 4.5 08/01/2025    CHLORIDE 103 08/01/2025    CO2 20 08/01/2025    GLUCOSE 105 (H) 08/01/2025    BUN 49 (H) 08/01/2025    CREATININE 4.13 (H) 08/01/2025        Lab Results   Component Value Date    WBC 6.4 07/29/2025    HEMOGLOBIN 11.4 (L) 07/29/2025    HEMATOCRIT 36.9 (L) 07/29/2025    PLATELETCT 183 07/29/2025      CT-CTA HEAD WITH & W/O-POST PROCESS   Final Result         1.  No large vessel occlusion or aneurysm identified      CT-ABDOMEN-PELVIS WITH   Final Result         1.  Hazy right perinephric fat stranding, consider component of pyelonephritis.   2.  Hepatomegaly.   3.   Mild intrahepatic and extrahepatic biliary ductal dilatation, consider causes of biliary obstruction with additional workup as clinically appropriate.            DX-CHEST-PORTABLE (1 VIEW)   Final Result      No acute cardiopulmonary disease.            Total time of the discharge process exceeds 39 minutes.         [1] No Known Allergies

## 2025-08-01 NOTE — DISCHARGE INSTRUCTIONS
Check your blood pressure each morning and night and keep a log to show the doctor at your follow up appointments. Call your physician if your blood pressure is greater than 180 systolic (the top number), or greater than 100 diastolic (the bottom number).

## 2025-08-01 NOTE — PROGRESS NOTES
Discharge instructions reviewed and signed, all questions answered, no PIV, Meds to Beds delivered and reviewed, discharged with cab voucher.

## 2025-08-04 ENCOUNTER — APPOINTMENT (OUTPATIENT)
Dept: INTERNAL MEDICINE | Facility: OTHER | Age: 40
End: 2025-08-04
Payer: MEDICAID

## 2025-08-04 NOTE — DOCUMENTATION QUERY
"                                                                         [facility]                                                                       Query Response Note      PATIENT:               BRYNN CHINCHILLA  ACCT #:                  [id number]  MRN:                     [id number]  :                      1985  ADMIT DATE:       2025 5:22 PM  DISCH DATE:        2025 11:04 AM  RESPONDING  PROVIDER #:        [id number]           QUERY TEXT:    Conflicting documentation is noted in the record:    Hypertensive emergency is documented in the ED note on  by Dr. Lopez    Hypertensive urgency is documented in the PN note on - by Dr. Best.     Please clarify the type of hypertension.    The patient's Clinical Indicators include:  Clinical Findings:    - ED note- Dr. Lopez:  \" hypertensive at 250/170 with a history of hypertension but was not on any medications and not taking anything and she had  hematuria in the clinic. \"  \" At this time because of the hematuria and concern for endorgan dysfunction in the setting of hypertension was sent to the ER.\"  \"FINAL DIAGNOSIS  1. Hypertensive emergency  2. Gross hematuria  3. Frequent headaches  4. TACOS (acute kidney injury)\"     - PN- Dr. Jodee Best:  \"Hypertensive urgency  Blood pressure at urgent care is as high as 240/170, improved after IV hydralazine  -Continue clonidine 0.2 mg twice daily  - Continue hydralazine 75 mg every 8 hours  - Discontinue Bisoprolol; transition to Coreg favoring nonselective beta-blocker over selective beta-blockade\"    per Nursing flowsheet:   at 1658- BP- 246/184; HR- 102; MAP- 205   at 2100- BP- 234/153;  HR -114; MAP- 180   at 0017- BP- 211/146; HR- 118, MAP- 168    Labs:  Creatinine- 2.77 () - 2.77 () - 3.57 () - 4.52 () - 4.28 ( ) - 4.75 () - 4.44 ( )    Risk factors: Hypertensive urgency, hypertension, TACOS, elevated troponin, headache, hematuria, pyelonephritis; " amphetamine abuse, solitary kidney, hematuria, hepatitis C     Treatment: Hydralazine 20 mg IV; Hydralazine 10 mg IV; labetalol 10 mg IV;  hydralazine 25mg- 100 mg po, nifedipine po, carvedilol, clonidine; zebeta;  excedrin; nephrology consult; head CT, labs    Please contact me with any questions:    Aracelis ARELLAON RN CCDS  [facility]  [email or web address]  Aracelis Herrera via [employer]al[employer]    Note: If you agree with a diagnosis listed above, please remember to include it in your concurrent daily documentation and onto the Discharge Summary.  Options provided:   -- Hypertensive urgency   -- Hypertensive emergency   -- Other explanation, Please specify other explanation   -- Unable to determine      Query created by: Aracelis Herrera on 7/30/2025 9:11 AM    RESPONSE TEXT:    Hypertensive urgency          Electronically signed by:  ERICA NORMAN MD 8/4/2025 2:30 PM

## 2025-08-06 ENCOUNTER — OFFICE VISIT (OUTPATIENT)
Dept: INTERNAL MEDICINE | Facility: OTHER | Age: 40
End: 2025-08-06
Payer: MEDICAID

## 2025-08-06 VITALS
HEIGHT: 65 IN | BODY MASS INDEX: 21.36 KG/M2 | WEIGHT: 128.2 LBS | TEMPERATURE: 98.9 F | DIASTOLIC BLOOD PRESSURE: 89 MMHG | HEART RATE: 85 BPM | OXYGEN SATURATION: 99 % | SYSTOLIC BLOOD PRESSURE: 146 MMHG

## 2025-08-06 DIAGNOSIS — N05.9 NEPHRITIC SYNDROME: ICD-10-CM

## 2025-08-06 DIAGNOSIS — B19.20 HEPATITIS C VIRUS INFECTION WITHOUT HEPATIC COMA, UNSPECIFIED CHRONICITY: ICD-10-CM

## 2025-08-06 DIAGNOSIS — Z12.31 ENCOUNTER FOR SCREENING MAMMOGRAM FOR MALIGNANT NEOPLASM OF BREAST: ICD-10-CM

## 2025-08-06 DIAGNOSIS — I16.1 HYPERTENSIVE EMERGENCY: Primary | ICD-10-CM

## 2025-08-06 DIAGNOSIS — N17.9 AKI (ACUTE KIDNEY INJURY) (HCC): ICD-10-CM

## 2025-08-06 PROBLEM — N80.9 ENDOMETRIOSIS: Status: RESOLVED | Noted: 2022-10-13 | Resolved: 2025-08-06

## 2025-08-06 PROBLEM — N83.8 MASS OF OVARY: Status: RESOLVED | Noted: 2022-09-22 | Resolved: 2025-08-06

## 2025-08-06 PROBLEM — I16.0 HYPERTENSIVE URGENCY: Status: RESOLVED | Noted: 2025-07-25 | Resolved: 2025-08-06

## 2025-08-06 PROBLEM — E87.6 HYPOKALEMIA: Status: RESOLVED | Noted: 2025-07-25 | Resolved: 2025-08-06

## 2025-08-06 PROCEDURE — 3077F SYST BP >= 140 MM HG: CPT | Mod: GC

## 2025-08-06 PROCEDURE — 3079F DIAST BP 80-89 MM HG: CPT | Mod: GC

## 2025-08-06 PROCEDURE — 99204 OFFICE O/P NEW MOD 45 MIN: CPT | Mod: GC

## 2025-08-06 RX ORDER — AMLODIPINE BESYLATE 5 MG/1
10 TABLET ORAL DAILY
Qty: 200 TABLET | Refills: 3 | Status: SHIPPED | OUTPATIENT
Start: 2025-08-06 | End: 2026-09-10

## 2025-08-06 RX ORDER — HYDRALAZINE HYDROCHLORIDE 100 MG/1
100 TABLET, FILM COATED ORAL EVERY 8 HOURS
Qty: 90 TABLET | Refills: 3 | Status: SHIPPED | OUTPATIENT
Start: 2025-08-06

## 2025-08-06 RX ORDER — CLONIDINE HYDROCHLORIDE 0.2 MG/1
0.2 TABLET ORAL 2 TIMES DAILY
Qty: 60 TABLET | Refills: 3 | Status: SHIPPED | OUTPATIENT
Start: 2025-08-06

## 2025-08-06 ASSESSMENT — FIBROSIS 4 INDEX: FIB4 SCORE: 1.24

## 2025-08-07 ENCOUNTER — HOSPITAL ENCOUNTER (OUTPATIENT)
Dept: LAB | Facility: MEDICAL CENTER | Age: 40
End: 2025-08-07
Attending: INTERNAL MEDICINE
Payer: MEDICAID

## 2025-08-07 ENCOUNTER — HOSPITAL ENCOUNTER (OUTPATIENT)
Dept: LAB | Facility: MEDICAL CENTER | Age: 40
End: 2025-08-07
Payer: MEDICAID

## 2025-08-07 DIAGNOSIS — R80.9 PROTEINURIA, UNSPECIFIED TYPE: ICD-10-CM

## 2025-08-07 DIAGNOSIS — N17.9 AKI (ACUTE KIDNEY INJURY) (HCC): ICD-10-CM

## 2025-08-07 DIAGNOSIS — I16.1 HYPERTENSIVE EMERGENCY: ICD-10-CM

## 2025-08-07 LAB
ALBUMIN SERPL BCP-MCNC: 3.9 G/DL (ref 3.2–4.9)
ALBUMIN/GLOB SERPL: 1.6 G/DL
ALP SERPL-CCNC: 271 U/L (ref 30–99)
ALT SERPL-CCNC: 87 U/L (ref 2–50)
ANION GAP SERPL CALC-SCNC: 16 MMOL/L (ref 7–16)
ANION GAP SERPL CALC-SCNC: 16 MMOL/L (ref 7–16)
AST SERPL-CCNC: 40 U/L (ref 12–45)
BILIRUB SERPL-MCNC: 0.3 MG/DL (ref 0.1–1.5)
BUN SERPL-MCNC: 41 MG/DL (ref 8–22)
BUN SERPL-MCNC: 42 MG/DL (ref 8–22)
CALCIUM ALBUM COR SERPL-MCNC: 8.4 MG/DL (ref 8.5–10.5)
CALCIUM SERPL-MCNC: 8.3 MG/DL (ref 8.5–10.5)
CALCIUM SERPL-MCNC: 8.3 MG/DL (ref 8.5–10.5)
CHLORIDE SERPL-SCNC: 101 MMOL/L (ref 96–112)
CHLORIDE SERPL-SCNC: 103 MMOL/L (ref 96–112)
CO2 SERPL-SCNC: 18 MMOL/L (ref 20–33)
CO2 SERPL-SCNC: 18 MMOL/L (ref 20–33)
CREAT SERPL-MCNC: 4.13 MG/DL (ref 0.5–1.4)
CREAT SERPL-MCNC: 4.25 MG/DL (ref 0.5–1.4)
CREAT UR-MCNC: 95.2 MG/DL
ERYTHROCYTE [DISTWIDTH] IN BLOOD BY AUTOMATED COUNT: 55 FL (ref 35.9–50)
GFR SERPLBLD CREATININE-BSD FMLA CKD-EPI: 13 ML/MIN/1.73 M 2
GFR SERPLBLD CREATININE-BSD FMLA CKD-EPI: 13 ML/MIN/1.73 M 2
GLOBULIN SER CALC-MCNC: 2.5 G/DL (ref 1.9–3.5)
GLUCOSE SERPL-MCNC: 131 MG/DL (ref 65–99)
GLUCOSE SERPL-MCNC: 136 MG/DL (ref 65–99)
HCT VFR BLD AUTO: 26.9 % (ref 37–47)
HGB BLD-MCNC: 8.6 G/DL (ref 12–16)
MCH RBC QN AUTO: 22.9 PG (ref 27–33)
MCHC RBC AUTO-ENTMCNC: 32 G/DL (ref 32.2–35.5)
MCV RBC AUTO: 71.7 FL (ref 81.4–97.8)
PLATELET # BLD AUTO: 279 K/UL (ref 164–446)
PMV BLD AUTO: 11.8 FL (ref 9–12.9)
POTASSIUM SERPL-SCNC: 4 MMOL/L (ref 3.6–5.5)
POTASSIUM SERPL-SCNC: 4.1 MMOL/L (ref 3.6–5.5)
PROT SERPL-MCNC: 6.4 G/DL (ref 6–8.2)
PROT UR-MCNC: 79 MG/DL (ref 0–15)
PROT/CREAT UR: 830 MG/G (ref 10–107)
RBC # BLD AUTO: 3.75 M/UL (ref 4.2–5.4)
SODIUM SERPL-SCNC: 135 MMOL/L (ref 135–145)
SODIUM SERPL-SCNC: 137 MMOL/L (ref 135–145)
WBC # BLD AUTO: 7.7 K/UL (ref 4.8–10.8)

## 2025-08-07 PROCEDURE — 36415 COLL VENOUS BLD VENIPUNCTURE: CPT

## 2025-08-07 PROCEDURE — 85027 COMPLETE CBC AUTOMATED: CPT

## 2025-08-07 PROCEDURE — 80048 BASIC METABOLIC PNL TOTAL CA: CPT

## 2025-08-07 PROCEDURE — 82570 ASSAY OF URINE CREATININE: CPT

## 2025-08-07 PROCEDURE — 84156 ASSAY OF PROTEIN URINE: CPT

## 2025-08-07 PROCEDURE — 80053 COMPREHEN METABOLIC PANEL: CPT

## 2025-08-08 ENCOUNTER — OFFICE VISIT (OUTPATIENT)
Dept: INTERNAL MEDICINE | Facility: OTHER | Age: 40
End: 2025-08-08
Payer: MEDICAID

## 2025-08-08 ENCOUNTER — TELEPHONE (OUTPATIENT)
Dept: INTERNAL MEDICINE | Facility: OTHER | Age: 40
End: 2025-08-08

## 2025-08-08 VITALS
OXYGEN SATURATION: 97 % | BODY MASS INDEX: 21.59 KG/M2 | HEART RATE: 96 BPM | DIASTOLIC BLOOD PRESSURE: 78 MMHG | SYSTOLIC BLOOD PRESSURE: 140 MMHG | WEIGHT: 129.6 LBS | TEMPERATURE: 98.6 F | HEIGHT: 65 IN

## 2025-08-08 DIAGNOSIS — B17.10 ACUTE HEPATITIS C VIRUS INFECTION WITHOUT HEPATIC COMA: ICD-10-CM

## 2025-08-08 DIAGNOSIS — N05.9 NEPHRITIC SYNDROME: ICD-10-CM

## 2025-08-08 DIAGNOSIS — R74.8 ELEVATED LIVER ENZYMES: ICD-10-CM

## 2025-08-08 DIAGNOSIS — I16.1 HYPERTENSIVE EMERGENCY: ICD-10-CM

## 2025-08-08 DIAGNOSIS — G44.89 OTHER HEADACHE SYNDROME: ICD-10-CM

## 2025-08-08 DIAGNOSIS — N12 PYELONEPHRITIS: ICD-10-CM

## 2025-08-08 DIAGNOSIS — D50.9 MICROCYTIC ANEMIA: ICD-10-CM

## 2025-08-08 DIAGNOSIS — F15.10 AMPHETAMINE ABUSE (HCC): ICD-10-CM

## 2025-08-08 DIAGNOSIS — R31.9 HEMATURIA, UNSPECIFIED TYPE: ICD-10-CM

## 2025-08-08 DIAGNOSIS — N17.9 ACUTE KIDNEY INJURY (HCC): Primary | ICD-10-CM

## 2025-08-08 PROBLEM — R79.89 ELEVATED TROPONIN: Status: RESOLVED | Noted: 2025-07-25 | Resolved: 2025-08-08

## 2025-08-08 PROCEDURE — 1126F AMNT PAIN NOTED NONE PRSNT: CPT | Mod: GC

## 2025-08-08 PROCEDURE — 3077F SYST BP >= 140 MM HG: CPT | Mod: GC

## 2025-08-08 PROCEDURE — 99214 OFFICE O/P EST MOD 30 MIN: CPT | Mod: GC

## 2025-08-08 PROCEDURE — 3078F DIAST BP <80 MM HG: CPT | Mod: GC

## 2025-08-08 RX ORDER — BUTALBITAL, ASPIRIN, AND CAFFEINE 325; 50; 40 MG/1; MG/1; MG/1
1 CAPSULE ORAL EVERY 4 HOURS PRN
Qty: 30 CAPSULE | Refills: 0 | Status: CANCELLED | OUTPATIENT
Start: 2025-08-08

## 2025-08-08 RX ORDER — ACETAMINOPHEN, ASPIRIN AND CAFFEINE 250; 250; 65 MG/1; MG/1; MG/1
2 TABLET ORAL
Qty: 30 TABLET | Refills: 1 | Status: SHIPPED | OUTPATIENT
Start: 2025-08-08

## 2025-08-08 ASSESSMENT — PAIN SCALES - GENERAL: PAINLEVEL_OUTOF10: NO PAIN

## 2025-08-08 ASSESSMENT — FIBROSIS 4 INDEX: FIB4 SCORE: 0.61

## 2025-08-15 ENCOUNTER — TELEPHONE (OUTPATIENT)
Dept: VASCULAR LAB | Facility: MEDICAL CENTER | Age: 40
End: 2025-08-15
Payer: MEDICAID

## 2025-08-20 ENCOUNTER — OFFICE VISIT (OUTPATIENT)
Dept: NEPHROLOGY | Facility: MEDICAL CENTER | Age: 40
End: 2025-08-20
Payer: MEDICAID

## 2025-08-20 VITALS
DIASTOLIC BLOOD PRESSURE: 100 MMHG | TEMPERATURE: 98.5 F | OXYGEN SATURATION: 98 % | WEIGHT: 130.5 LBS | BODY MASS INDEX: 21.74 KG/M2 | HEART RATE: 86 BPM | HEIGHT: 65 IN | SYSTOLIC BLOOD PRESSURE: 150 MMHG

## 2025-08-20 DIAGNOSIS — N18.9 CHRONIC KIDNEY DISEASE, UNSPECIFIED CKD STAGE: ICD-10-CM

## 2025-08-20 DIAGNOSIS — R60.9 EDEMA, UNSPECIFIED TYPE: ICD-10-CM

## 2025-08-20 DIAGNOSIS — R80.9 PROTEINURIA, UNSPECIFIED TYPE: ICD-10-CM

## 2025-08-20 DIAGNOSIS — N17.9 AKI (ACUTE KIDNEY INJURY) (HCC): Primary | ICD-10-CM

## 2025-08-20 DIAGNOSIS — I10 PRIMARY HYPERTENSION: ICD-10-CM

## 2025-08-20 DIAGNOSIS — B17.10 ACUTE HEPATITIS C VIRUS INFECTION WITHOUT HEPATIC COMA: ICD-10-CM

## 2025-08-20 DIAGNOSIS — D64.9 ANEMIA, UNSPECIFIED TYPE: ICD-10-CM

## 2025-08-20 PROCEDURE — 99214 OFFICE O/P EST MOD 30 MIN: CPT | Performed by: INTERNAL MEDICINE

## 2025-08-20 PROCEDURE — 3080F DIAST BP >= 90 MM HG: CPT | Performed by: INTERNAL MEDICINE

## 2025-08-20 PROCEDURE — G2211 COMPLEX E/M VISIT ADD ON: HCPCS | Performed by: INTERNAL MEDICINE

## 2025-08-20 PROCEDURE — 3077F SYST BP >= 140 MM HG: CPT | Performed by: INTERNAL MEDICINE

## 2025-08-20 RX ORDER — FUROSEMIDE 80 MG/1
80 TABLET ORAL 2 TIMES DAILY
Qty: 180 TABLET | Refills: 3 | Status: SHIPPED | OUTPATIENT
Start: 2025-08-20

## 2025-08-20 ASSESSMENT — ENCOUNTER SYMPTOMS
CHILLS: 0
VOMITING: 0
SHORTNESS OF BREATH: 0
COUGH: 0
NAUSEA: 0
FEVER: 0
HYPERTENSION: 1
NERVOUS/ANXIOUS: 1

## 2025-08-20 ASSESSMENT — LIFESTYLE VARIABLES
SKIP TO QUESTIONS 9-10: 1
AUDIT-C TOTAL SCORE: 0
HOW OFTEN DO YOU HAVE SIX OR MORE DRINKS ON ONE OCCASION: NEVER
HOW OFTEN DO YOU HAVE A DRINK CONTAINING ALCOHOL: NEVER
HOW MANY STANDARD DRINKS CONTAINING ALCOHOL DO YOU HAVE ON A TYPICAL DAY: PATIENT DOES NOT DRINK

## 2025-08-20 ASSESSMENT — FIBROSIS 4 INDEX: FIB4 SCORE: 0.61

## 2025-08-22 ENCOUNTER — NON-PROVIDER VISIT (OUTPATIENT)
Dept: VASCULAR LAB | Facility: MEDICAL CENTER | Age: 40
End: 2025-08-22
Attending: INTERNAL MEDICINE
Payer: MEDICAID

## 2025-08-22 VITALS — WEIGHT: 130.51 LBS | BODY MASS INDEX: 21.72 KG/M2

## 2025-08-22 DIAGNOSIS — B17.10 ACUTE HEPATITIS C VIRUS INFECTION WITHOUT HEPATIC COMA: Primary | ICD-10-CM

## 2025-08-22 PROCEDURE — 99213 OFFICE O/P EST LOW 20 MIN: CPT

## 2025-08-22 RX ORDER — VELPATASVIR AND SOFOSBUVIR 100; 400 MG/1; MG/1
1 TABLET, FILM COATED ORAL DAILY
Qty: 84 TABLET | Refills: 0 | Status: SHIPPED | OUTPATIENT
Start: 2025-08-22 | End: 2025-08-25 | Stop reason: CLARIF

## 2025-08-22 RX ORDER — FERROUS SULFATE 325(65) MG
325 TABLET ORAL DAILY
COMMUNITY

## 2025-08-22 ASSESSMENT — FIBROSIS 4 INDEX: FIB4 SCORE: 0.61

## 2025-08-25 RX ORDER — VELPATASVIR AND SOFOSBUVIR 100; 400 MG/1; MG/1
1 TABLET, FILM COATED ORAL DAILY
Qty: 84 TABLET | Refills: 0 | Status: SHIPPED | OUTPATIENT
Start: 2025-08-25

## 2025-08-25 RX ORDER — VELPATASVIR AND SOFOSBUVIR 100; 400 MG/1; MG/1
1 TABLET, FILM COATED ORAL DAILY
Qty: 84 TABLET | Refills: 0 | Status: CANCELLED | OUTPATIENT
Start: 2025-08-25

## 2025-08-28 ENCOUNTER — SPECIALTY PHARMACY (OUTPATIENT)
Dept: VASCULAR LAB | Facility: MEDICAL CENTER | Age: 40
End: 2025-08-28
Payer: MEDICAID

## (undated) DEVICE — SET TUBING PNEUMOCLEAR HIGH FLOW SMOKE EVACUATION (10EA/BX)

## (undated) DEVICE — CLIP HEMOLOCK PURPLE - (14/BX)

## (undated) DEVICE — SPATULA PERMANENT CAUTERY DA VINCI 10X'S REUSABLE

## (undated) DEVICE — SUTURE GENERAL

## (undated) DEVICE — SYSTEM CLEARIFY VISUALIZATION (10EA/PK)

## (undated) DEVICE — SUTURE QUILL 0 PDO 14X14 - (12/BX)

## (undated) DEVICE — WIRE GUIDE SENSOR DUAL FLEX - 5/BX

## (undated) DEVICE — TOWEL STOP TIMEOUT SAFETY FLAG (40EA/CA)

## (undated) DEVICE — PACK TRENGUARD 450 PROCEDURE (12EA/CA)

## (undated) DEVICE — SYRINGE 50ML CATHETER TIP (40EA/BX 4BX/CA)

## (undated) DEVICE — GLOVE BIOGEL PI INDICATOR SZ 7.0 SURGICAL PF LF - (50/BX 4BX/CA)

## (undated) DEVICE — CATHETER URETHRAL FOLEY SILICONE OD16 FR 10 ML (10EA/CA)

## (undated) DEVICE — WATER IRRIGATION STERILE 1000ML (12EA/CA)

## (undated) DEVICE — CHLORAPREP 26 ML APPLICATOR - ORANGE TINT(25/CA)

## (undated) DEVICE — GOWN SURGEONS X-LARGE - DISP. (30/CA)

## (undated) DEVICE — CLIP SM INTNL HRZN TI ESCP LGT - (24EA/PK 25PK/BX)

## (undated) DEVICE — SYSTEM CONTAINED EXTRACTION W/BALLOON BLUNT TIP TROCAR 17CM (1/EA)

## (undated) DEVICE — TRAY SRGPRP PVP IOD WT PRP - (20/CA)

## (undated) DEVICE — CATHETER FOLEY 22 FR 30 CC (12EA/CA)

## (undated) DEVICE — LACTATED RINGERS INJ 1000 ML - (14EA/CA 60CA/PF)

## (undated) DEVICE — NEEDLE INSUFFLATION FOR STEP - (12/BX)

## (undated) DEVICE — SLEEVE, VASO, THIGH, MED

## (undated) DEVICE — TUBE CONNECT SUCTION CLEAR 120 X 1/4" (50EA/CA)"

## (undated) DEVICE — PAD OR TABLE DA VINCI 2IN X 20IN X 72IN - (12EA/CA)

## (undated) DEVICE — GOWN WARMING STANDARD FLEX - (30/CA)

## (undated) DEVICE — SUTURE 3-0 MONOCRYL PLUS PS-1 - 27 INCH (36/BX)

## (undated) DEVICE — SYRINGE ASEPTO - (50EA/CA

## (undated) DEVICE — BAG SPONGE COUNT 10.25 X 32 - BLUE (250/CA)

## (undated) DEVICE — SEAL 5MM-8MM UNIVERSAL  BOX OF 10

## (undated) DEVICE — SPONGE XRAY 8X4 STERL. 12PL - (10EA/TY 80TY/CA)

## (undated) DEVICE — ELECTRODE DUAL RETURN W/ CORD - (50/PK)

## (undated) DEVICE — SUTURE 3-0 SILK SH 30 (36PK/BX)

## (undated) DEVICE — FORCEPS PROGRASP DA VINCI 10X'S REUSABLE

## (undated) DEVICE — GLOVE BIOGEL PI INDICATOR SZ 6.0 SURGICAL PF LF -(200PR/CA)

## (undated) DEVICE — GLOVE COTTON STERILE (50/CA)

## (undated) DEVICE — SET IRRIGATION CYSTOSCOPY Y-TYPE L81 IN (20EA/CA)

## (undated) DEVICE — SLEEVE VASO CALF MED - (10PR/CA)

## (undated) DEVICE — SYRINGE 30 ML LL (56/BX)

## (undated) DEVICE — SET EXTENSION WITH 2 PORTS (48EA/CA) ***PART #2C8610 IS A SUBSTITUTE*****

## (undated) DEVICE — CANISTER SUCTION 3000ML MECHANICAL FILTER AUTO SHUTOFF MEDI-VAC NONSTERILE LF DISP  (40EA/CA)

## (undated) DEVICE — DRAPE COLUMN  BOX OF 20

## (undated) DEVICE — ROBOTIC SURGERY SERVICES

## (undated) DEVICE — SENSOR OXIMETER ADULT SPO2 RD SET (20EA/BX)

## (undated) DEVICE — CATHETER URETHRAL OPEN END AXXCESS (10EA/BX)

## (undated) DEVICE — SET LEADWIRE 5 LEAD BEDSIDE DISPOSABLE ECG (1SET OF 5/EA)

## (undated) DEVICE — DRAPE ARM  BOX OF 20

## (undated) DEVICE — TUBING CLEARLINK DUO-VENT - C-FLO (48EA/CA)

## (undated) DEVICE — BIPOLAR FORCE DA VINCI 12X'S REISABLE

## (undated) DEVICE — COVER FOOT UNIVERSAL DISP. - (25EA/CA)

## (undated) DEVICE — DRAPE UNDER BUTTOCKS FLUID - (20/CA)

## (undated) DEVICE — SET SUCTION/IRRIGATION WITH DISPOSABLE TIP (6/CA )PART #0250-070-520 IS A SUB

## (undated) DEVICE — WIRE GUIDE .038 X 150 FLEX - .

## (undated) DEVICE — DRESSING NON-ADHERING 8 X 3 - (50/BX)

## (undated) DEVICE — PACK GYN DAVINCI (2EA/CA)

## (undated) DEVICE — SUTURE 4-0 VICRYL PLUS RB-1 - 27 INCH (36/BX)

## (undated) DEVICE — GLOVE BIOGEL PI INDICATOR SZ 6.5 SURGICAL PF LF - (50/BX 4BX/CA)

## (undated) DEVICE — SUCTION INSTRUMENT YANKAUER BULBOUS TIP W/O VENT (50EA/CA)

## (undated) DEVICE — OBTURATOR BLADELESS LONG 8MM (6EA/BX)

## (undated) DEVICE — GLOVE SZ 6.5 BIOGEL PI MICRO - PF LF (50PR/BX)

## (undated) DEVICE — JELLY SURGILUBE STERILE TUBE 4.25 OZ (1/EA)

## (undated) DEVICE — ARMREST CRADLE FOAM - (2PR/PK 12PR/CA)

## (undated) DEVICE — NEEDLE DRIVER MEGA SUTURECUT DA VINCI 15X'S REUSABLE

## (undated) DEVICE — SUTURE 2-0 VICRYL PLUS SH - 27 INCH (36/BX)

## (undated) DEVICE — COVER TIP ENDOWRIST HOT SHEAR - (10EA/BX) DA VINCI

## (undated) DEVICE — CATHETER URET RUTNR WEDGE TIP 5FR (10EA/BX)

## (undated) DEVICE — ENDOLOOP 0 VICRYL - (12/BX)

## (undated) DEVICE — GLOVE BIOGEL PI ORTHO SZ 7.5 PF LF (40PR/BX)

## (undated) DEVICE — SUTURE 2-0 VICRYL PLUS CT-2 - 27 INCH (36/BX)